# Patient Record
Sex: FEMALE | Race: OTHER | Employment: UNEMPLOYED | RURAL
[De-identification: names, ages, dates, MRNs, and addresses within clinical notes are randomized per-mention and may not be internally consistent; named-entity substitution may affect disease eponyms.]

---

## 2018-12-07 ENCOUNTER — OFFICE VISIT (OUTPATIENT)
Dept: FAMILY MEDICINE CLINIC | Age: 28
End: 2018-12-07

## 2018-12-07 VITALS
HEART RATE: 68 BPM | TEMPERATURE: 98.2 F | OXYGEN SATURATION: 96 % | WEIGHT: 131 LBS | HEIGHT: 64 IN | SYSTOLIC BLOOD PRESSURE: 102 MMHG | RESPIRATION RATE: 18 BRPM | DIASTOLIC BLOOD PRESSURE: 68 MMHG | BODY MASS INDEX: 22.36 KG/M2

## 2018-12-07 DIAGNOSIS — H93.93 DISORDER OF BOTH EARS: ICD-10-CM

## 2018-12-07 DIAGNOSIS — K50.919 CROHN'S DISEASE WITH COMPLICATION, UNSPECIFIED GASTROINTESTINAL TRACT LOCATION (HCC): ICD-10-CM

## 2018-12-07 DIAGNOSIS — J30.89 ENVIRONMENTAL AND SEASONAL ALLERGIES: ICD-10-CM

## 2018-12-07 DIAGNOSIS — Z86.2 HISTORY OF ANEMIA: ICD-10-CM

## 2018-12-07 DIAGNOSIS — K21.9 GASTROESOPHAGEAL REFLUX DISEASE WITHOUT ESOPHAGITIS: ICD-10-CM

## 2018-12-07 DIAGNOSIS — G43.019 INTRACTABLE MIGRAINE WITHOUT AURA AND WITHOUT STATUS MIGRAINOSUS: ICD-10-CM

## 2018-12-07 DIAGNOSIS — F41.9 ANXIETY: Primary | ICD-10-CM

## 2018-12-07 RX ORDER — RANITIDINE 150 MG/1
150 TABLET, FILM COATED ORAL 2 TIMES DAILY
Qty: 180 TAB | Refills: 1 | Status: SHIPPED | OUTPATIENT
Start: 2018-12-07 | End: 2020-01-07

## 2018-12-07 RX ORDER — ALBUTEROL SULFATE 90 UG/1
2 AEROSOL, METERED RESPIRATORY (INHALATION)
COMMUNITY
End: 2018-12-07

## 2018-12-07 RX ORDER — LEVALBUTEROL TARTRATE 45 UG/1
2 AEROSOL, METERED ORAL
COMMUNITY
End: 2018-12-19 | Stop reason: SDUPTHER

## 2018-12-07 RX ORDER — RANITIDINE 150 MG/1
1 TABLET, FILM COATED ORAL DAILY
Refills: 5 | COMMUNITY
Start: 2018-11-27 | End: 2018-12-07 | Stop reason: SDUPTHER

## 2018-12-07 RX ORDER — CETIRIZINE HCL 10 MG
10 TABLET ORAL DAILY
Qty: 90 TAB | Refills: 1 | Status: SHIPPED | OUTPATIENT
Start: 2018-12-07 | End: 2019-02-11

## 2018-12-07 RX ORDER — ETONOGESTREL AND ETHINYL ESTRADIOL .12; .015 MG/D; MG/D
1 INSERT, EXTENDED RELEASE VAGINAL AS DIRECTED
Refills: 5 | COMMUNITY
Start: 2018-11-27 | End: 2019-02-11

## 2018-12-07 NOTE — PATIENT INSTRUCTIONS
[address] [address] 2164 [facility] JESSICA, [address] Phone: [phone] Walter Lopez Cindy Uriostegui of [employer]  [address] Riley Gu[address]ro [address] Phone: [phone] [facility] [address] , [address] [place], [facility] N. Surekha Cross. Phone: [phone] A Healthy Lifestyle: Care Instructions Your Care Instructions A healthy lifestyle can help you feel good, stay at a healthy weight, and have plenty of energy for both work and play. A healthy lifestyle is something you can share with your whole family. A healthy lifestyle also can lower your risk for serious health problems, such as high blood pressure, heart disease, and diabetes. You can follow a few steps listed below to improve your health and the health of your family. Follow-up care is a key part of your treatment and safety. Be sure to make and go to all appointments, and call your doctor if you are having problems. It's also a good idea to know your test results and keep a list of the medicines you take. How can you care for yourself at home? · Do not eat too much sugar, fat, or fast foods. You can still have dessert and treats now and then. The goal is moderation. · Start small to improve your eating habits. Pay attention to portion sizes, drink less juice and soda pop, and eat more fruits and vegetables. ? Eat a healthy amount of food. A 3-ounce serving of meat, for example, is about the size of a deck of cards. Fill the rest of your plate with vegetables and whole grains. ? Limit the amount of soda and sports drinks you have every day. Drink more water when you are thirsty. ? Eat at least 5 servings of fruits and vegetables every day. It may seem like a lot, but it is not hard to reach this goal. A serving or helping is 1 piece of fruit, 1 cup of vegetables, or 2 cups of leafy, raw vegetables. Have an apple or some carrot sticks as an afternoon snack instead of a candy bar. Try to have fruits and/or vegetables at every meal. 
· Make exercise part of your daily routine. You may want to start with simple activities, such as walking, bicycling, or slow swimming. Try to be active 30 to 60 minutes every day. You do not need to do all 30 to 60 minutes all at once. For example, you can exercise 3 times a day for 10 or 20 minutes. Moderate exercise is safe for most people, but it is always a good idea to talk to your doctor before starting an exercise program. 
· Keep moving. Donovan Acharya the lawn, work in the garden, or [employer]. Take the stairs instead of the elevator at work. · If you smoke, quit. People who smoke have an increased risk for heart attack, stroke, cancer, and other lung illnesses. Quitting is hard, but there are ways to boost your chance of quitting tobacco for good. ? Use nicotine gum, patches, or lozenges. ? Ask your doctor about stop-smoking programs and medicines. ? Keep trying. In addition to reducing your risk of diseases in the future, you will notice some benefits soon after you stop using tobacco. If you have shortness of breath or asthma symptoms, they will likely get better within a few weeks after you quit. · Limit how much alcohol you drink. Moderate amounts of alcohol (up to 2 drinks a day for men, 1 drink a day for women) are okay. But drinking too much can lead to liver problems, high blood pressure, and other health problems. Family health If you have a family, there are many things you can do together to improve your health. · Eat meals together as a family as often as possible. · Eat healthy foods. This includes fruits, vegetables, lean meats and dairy, and whole grains. · Include your family in your fitness plan.  Most people think of activities such as jogging or tennis as the way to fitness, but there are many ways you and your family can be more active. Anything that makes you breathe hard and gets your heart pumping is exercise. Here are some tips: 
? Walk to do errands or to take your child to school or the bus. 
? Go for a family bike ride after dinner instead of watching TV. Where can you learn more? Go to http://krysten-ramo.info/. Enter N685 in the search box to learn more about \"A Healthy Lifestyle: Care Instructions. \" Current as of: December 7, 2017 Content Version: 11.8 © 4900-2808 [employer]. Care instructions adapted under license by [employer] (which disclaims liability or warranty for this information). If you have questions about a medical condition or this instruction, always ask your healthcare professional. [address] any warranty or liability for your use of this information.

## 2018-12-07 NOTE — PROGRESS NOTES
Identified pt with two pt identifiers(name and ). Chief Complaint Patient presents with Juan R Rhodes Establish Care Moved from [place] in August.   
 Anxiety  
  developed symptoms  upon moving to [place], has gotten worse with loss of job.  New Order GI referral for Crohns, Allergist for immunotherapy, ENT for a strange tapping noise in ears. Health Maintenance Due Topic  DTaP/Tdap/Td series (1 - Tdap)  PAP AKA CERVICAL CYTOLOGY Wt Readings from Last 3 Encounters:  
18 131 lb (59.4 kg) Temp Readings from Last 3 Encounters:  
18 98.2 °F (36.8 °C) (Oral) BP Readings from Last 3 Encounters:  
18 102/68 Pulse Readings from Last 3 Encounters:  
18 68 Learning Assessment: 
:  
 
No flowsheet data found. Depression Screening: 
:  
 
PHQ over the last two weeks 2018 Little interest or pleasure in doing things Not at all Feeling down, depressed, irritable, or hopeless Not at all Total Score PHQ 2 0 Fall Risk Assessment: 
:  
 
No flowsheet data found. Abuse Screening: 
:  
 
Abuse Screening Questionnaire 2018 Do you ever feel afraid of your partner? Ml Ashby Are you in a relationship with someone who physically or mentally threatens you? Ml Ashby Is it safe for you to go home? Ilene Mortimer Coordination of Care Questionnaire: 
:  
 
1) Have you been to an emergency room, urgent care clinic since your last visit? no  
Hospitalized since your last visit? no          
 
2) Have you seen or consulted any other health care providers outside of [address] [place] [address] since your last visit? yes  [facility]. PCP [facility] Carlos Alberto Sof[facility] (Include any pap smears or colon screenings in this section.) 3) Do you have an Advance Directive on file? no 
Are you interested in receiving information about Advance Directives? no 
 
Reviewed record in preparation for visit and have obtained necessary documentation. Medication reconciliation up to date and corrected with patient at this time.

## 2018-12-07 NOTE — PROGRESS NOTES
Subjective:  
  
Zoya Soares is a 33 y.o. female new patient here today to establish care. Crohn's Disease: diagnosed at the age 17 and has been managed by Gastroenterology, intermittently has symptoms. Asthma: triggers cold and allergens, has been on immunotherapy for allergies and would like to see Allergist.  
 
Anxiety: started in September 2018 after she moved from [place] to [place]. Symptoms will come and go and she is finding that this occurs in moments. Does have history of postpartum depression which did not require medication. Finds that rain storms trigger her anxiety. - Episodes will last for about an hour before self-resolving - She is finding that work has been going well ANJANA 2/7 12/7/2018 Feeling nervous, anxious or on edge? 3 Not being able to stop or control worrying? 3 ANJANA-2 Subtotal 6 Worrying too much about different things? 3 Trouble relaxing? 2 Being so restless that it is hard to sit still? 1 Becoming easily annoyed or irritable? 1 Feeling afraid as if something awful might happen? 2  
ANJANA-7 Total Score 15 ANJANA-7 Result Severe Anxiety If you checked off any problems, how difficult have these problems made it for you to do your work, take care of thinks at home, or get along with other people? Somewhat difficult GERD: on ranitidine and controlled. No dysphagia, hematemesis, melena, hematochezia, unexplained weight loss. Migraine: per history, associated with light sensitivity, nausea, vomiting. Requests to see Neurology. Ears: has been having weird sensation in the ears for a few months. No ear trauma, drainage. Requests to see ENT. Current Outpatient Medications Medication Sig Dispense Refill  raNITIdine (ZANTAC) 150 mg tablet Take 1 Tab by mouth daily. 5  
 NUVARING 0.12-0.015 mg/24 hr vaginal ring Insert 1 Ring into vagina as directed.   5  
 levalbuterol tartrate (XOPENEX HFA) 45 mcg/actuation inhaler Take 2 Puffs by inhalation every four (4) hours as needed for Wheezing. Allergies Allergen Reactions  Shellfish Derived Hives Past medical history - reviewed. Past Medical History:  
Diagnosis Date  Asthma  Crohn's disease ([address]  Environmental and seasonal allergies  GERD (gastroesophageal reflux disease)  Migraines  Vertigo Social history - reviewed. Social History Tobacco Use  Smoking status: Former Smoker Types: Cigarettes  Smokeless tobacco: Never Used  Tobacco comment: Vapes Substance Use Topics  Alcohol use: Yes Frequency: 2-4 times a month Drinks per session: 1 or 2 Binge frequency: Never Family history - reviewed. Family History Problem Relation Age of Onset  Diabetes Mother  Hypertension Mother  Elevated Lipids Mother  Hypertension Father  Elevated Lipids Father Review of Systems Pertinent items are noted in HPI. Objective:  
 
Visit Vitals /68 (BP 1 Location: Right arm, BP Patient Position: Sitting) Comment: Manual  
Pulse 68 Temp 98.2 °F (36.8 °C) (Oral) Comment: . Resp 18 Ht 5' 4\" (1.626 m) Wt 131 lb (59.4 kg) LMP 11/27/2018 (Exact Date) SpO2 96% BMI 22.49 kg/m² General appearance - alert, well appearing, and in no distress Eyes - pupils equal and reactive, extraocular eye movements intact, sclera anicteric Oropharyngx - mucous membranes moist, pharynx normal without lesions Neck - supple, no significant adenopathy, thyroid exam: thyroid is normal in size without nodules or tenderness Chest - clear to auscultation, no wheezes, rales or rhonchi, symmetric air entry, no tachypnea, retractions or cyanosis Heart - normal rate, regular rhythm, normal S1, S2, no murmurs, rubs, clicks or gallops Abdomen - soft, nontender, nondistended, no masses or organomegaly Extremities - peripheral pulses normal, no pedal edema, no clubbing or cyanosis Neurologic - alert, oriented, normal speech, no focal findings or movement disorder noted Mental Status - alert, oriented to person, place, and time, normal behavior, speech, dress, motor activity, and thought processes. Appears mildly anxious. Assessment/Plan:  
Carlos Juarez is a 33 y.o. female seen for:  
 
1. Anxiety: check labs as below. Severe anxiety on ANJANA-7 screening and did discuss medical therapy (SSRI/SNRI vs Buspar vs PRN anxiolytic therapy). She is hesitant to start medical therapy at this time. Discussed outpatient behavioral therapy which she is amendable to. Provided with information to local offices and encouraged to schedule appointment soon. - CBC WITH AUTOMATED DIFF 
- METABOLIC PANEL, COMPREHENSIVE 
- THYROID CASCADE PROFILE 2. History of anemia 
- CBC WITH AUTOMATED DIFF 3. Crohn's disease with complication, unspecified gastrointestinal tract location [facility]): per history, will need to establish care with Gastroenterologist in the area. Referral placed. - REFERRAL TO GASTROENTEROLOGY 4. Environmental and seasonal allergies: continue with cetirizine. Had been treated with allergy injections prior to moving to [place]. Refer to Allergy.  
- REFERRAL TO ALLERGY 
- cetirizine (ZYRTEC) 10 mg tablet; Take 1 Tab by mouth daily. Dispense: 90 Tab; Refill: 1 
 
5. Intractable migraine without aura and without status migrainosus 
- REFERRAL TO NEUROLOGY 6. Disorder of both ears 
- REFERRAL TO ENT-OTOLARYNGOLOGY 7. Gastroesophageal reflux disease without esophagitis: controlled with use of ranitidine. Continue with current therapy. - raNITIdine (ZANTAC) 150 mg tablet; Take 1 Tab by mouth two (2) times a day. Dispense: 180 Tab; Refill: 1 I have discussed the diagnosis with the patient and the intended plan as seen in the above orders. The patient has received an after-visit summary and questions were answered concerning future plans.  I have discussed medication side effects and warnings with the patient as well. Patient verbalizes understanding of plan of care and denies further questions or concerns at this time. Informed patient to return to the office if symptoms worsen or if new symptoms arise. Follow-up Disposition: 
Return as needed.

## 2018-12-08 LAB
ALBUMIN SERPL-MCNC: 4.2 G/DL (ref 3.5–5.5)
ALBUMIN/GLOB SERPL: 1.6 {RATIO} (ref 1.2–2.2)
ALP SERPL-CCNC: 54 IU/L (ref 39–117)
ALT SERPL-CCNC: 11 IU/L (ref 0–32)
AST SERPL-CCNC: 12 IU/L (ref 0–40)
BASOPHILS # BLD AUTO: 0 X10E3/UL (ref 0–0.2)
BASOPHILS NFR BLD AUTO: 0 %
BILIRUB SERPL-MCNC: 0.3 MG/DL (ref 0–1.2)
BUN SERPL-MCNC: 9 MG/DL (ref 6–20)
BUN/CREAT SERPL: 10 (ref 9–23)
CALCIUM SERPL-MCNC: 9.1 MG/DL (ref 8.7–10.2)
CHLORIDE SERPL-SCNC: 105 MMOL/L (ref 96–106)
CO2 SERPL-SCNC: 22 MMOL/L (ref 20–29)
CREAT SERPL-MCNC: 0.88 MG/DL (ref 0.57–1)
EOSINOPHIL # BLD AUTO: 0 X10E3/UL (ref 0–0.4)
EOSINOPHIL NFR BLD AUTO: 1 %
ERYTHROCYTE [DISTWIDTH] IN BLOOD BY AUTOMATED COUNT: 12.9 % (ref 12.3–15.4)
GLOBULIN SER CALC-MCNC: 2.6 G/DL (ref 1.5–4.5)
GLUCOSE SERPL-MCNC: 77 MG/DL (ref 65–99)
HCT VFR BLD AUTO: 39.7 % (ref 34–46.6)
HGB BLD-MCNC: 13.4 G/DL (ref 11.1–15.9)
IMM GRANULOCYTES # BLD: 0 X10E3/UL (ref 0–0.1)
IMM GRANULOCYTES NFR BLD: 0 %
LYMPHOCYTES # BLD AUTO: 2 X10E3/UL (ref 0.7–3.1)
LYMPHOCYTES NFR BLD AUTO: 42 %
MCH RBC QN AUTO: 28.3 PG (ref 26.6–33)
MCHC RBC AUTO-ENTMCNC: 33.8 G/DL (ref 31.5–35.7)
MCV RBC AUTO: 84 FL (ref 79–97)
MONOCYTES # BLD AUTO: 0.3 X10E3/UL (ref 0.1–0.9)
MONOCYTES NFR BLD AUTO: 6 %
NEUTROPHILS # BLD AUTO: 2.5 X10E3/UL (ref 1.4–7)
NEUTROPHILS NFR BLD AUTO: 51 %
PLATELET # BLD AUTO: 237 X10E3/UL (ref 150–379)
POTASSIUM SERPL-SCNC: 4.3 MMOL/L (ref 3.5–5.2)
PROT SERPL-MCNC: 6.8 G/DL (ref 6–8.5)
RBC # BLD AUTO: 4.74 X10E6/UL (ref 3.77–5.28)
SODIUM SERPL-SCNC: 141 MMOL/L (ref 134–144)
TSH SERPL DL<=0.005 MIU/L-ACNC: 1.25 UIU/ML (ref 0.45–4.5)
WBC # BLD AUTO: 4.8 X10E3/UL (ref 3.4–10.8)

## 2018-12-11 ENCOUNTER — TELEPHONE (OUTPATIENT)
Dept: FAMILY MEDICINE CLINIC | Age: 28
End: 2018-12-11

## 2018-12-11 NOTE — TELEPHONE ENCOUNTER
----- Message from Wilfredo Gonzalez MD sent at 12/10/2018  9:30 AM EST -----  Labs normal. Letter will be sent to home as well.

## 2018-12-19 ENCOUNTER — OFFICE VISIT (OUTPATIENT)
Dept: FAMILY MEDICINE CLINIC | Age: 28
End: 2018-12-19

## 2018-12-19 VITALS
HEIGHT: 64 IN | SYSTOLIC BLOOD PRESSURE: 116 MMHG | BODY MASS INDEX: 22.36 KG/M2 | DIASTOLIC BLOOD PRESSURE: 74 MMHG | OXYGEN SATURATION: 98 % | TEMPERATURE: 98.1 F | RESPIRATION RATE: 16 BRPM | WEIGHT: 131 LBS | HEART RATE: 92 BPM

## 2018-12-19 DIAGNOSIS — F41.9 ANXIETY: ICD-10-CM

## 2018-12-19 DIAGNOSIS — H93.90 DISORDER OF EAR, UNSPECIFIED LATERALITY: Primary | ICD-10-CM

## 2018-12-19 DIAGNOSIS — G47.00 INSOMNIA, UNSPECIFIED TYPE: ICD-10-CM

## 2018-12-19 DIAGNOSIS — J45.40 MODERATE PERSISTENT ASTHMA WITHOUT COMPLICATION: ICD-10-CM

## 2018-12-19 RX ORDER — LEVALBUTEROL TARTRATE 45 UG/1
2 AEROSOL, METERED ORAL
Qty: 1 INHALER | Refills: 2 | Status: SHIPPED | OUTPATIENT
Start: 2018-12-19 | End: 2019-02-11

## 2018-12-19 RX ORDER — BUSPIRONE HYDROCHLORIDE 30 MG/1
15 TABLET ORAL
Qty: 30 TAB | Refills: 0 | Status: SHIPPED | OUTPATIENT
Start: 2018-12-19 | End: 2019-02-11

## 2018-12-19 NOTE — PROGRESS NOTES
Identified pt with two pt identifiers(name and ). Chief Complaint   Patient presents with    Sleep Problem     Reports she was dx by her physician in [place] with trouble sleeping when the seasons change and was previously prescribed Ambien to tx sx. Health Maintenance Due   Topic    Pneumococcal 19-64 Medium Risk (1 of 1 - PPSV23)    DTaP/Tdap/Td series (1 - Tdap)    PAP AKA CERVICAL CYTOLOGY        Wt Readings from Last 3 Encounters:   18 131 lb (59.4 kg)   18 131 lb (59.4 kg)     Temp Readings from Last 3 Encounters:   18 98.1 °F (36.7 °C) (Oral)   18 98.2 °F (36.8 °C) (Oral)     BP Readings from Last 3 Encounters:   18 116/74   18 102/68     Pulse Readings from Last 3 Encounters:   18 92   18 68         Learning Assessment:  :     Learning Assessment 2018   PRIMARY LEARNER Patient   BARRIERS PRIMARY LEARNER NONE   CO-LEARNER CAREGIVER No   PRIMARY LANGUAGE ENGLISH   LEARNER PREFERENCE PRIMARY DEMONSTRATION     LISTENING   ANSWERED BY patient   RELATIONSHIP SELF       Depression Screening:  :     PHQ over the last two weeks 2018   Little interest or pleasure in doing things Not at all   Feeling down, depressed, irritable, or hopeless Not at all   Total Score PHQ 2 0       Fall Risk Assessment:  :     No flowsheet data found. Abuse Screening:  :     Abuse Screening Questionnaire 2018   Do you ever feel afraid of your partner? N   Are you in a relationship with someone who physically or mentally threatens you? N   Is it safe for you to go home?  Y       Coordination of Care Questionnaire:  :     1) Have you been to an emergency room, urgent care clinic since your last visit? no   Hospitalized since your last visit? no             2) Have you seen or consulted any other health care providers outside of [address] [place] [address] since your last visit? no  (Include any pap smears or colon screenings in this section.)    3) Do you have an Advance Directive on file? no  Are you interested in receiving information about Advance Directives? no    Patient is accompanied by her young son. I have received verbal consent from Richa Pelayo to discuss any/all medical information while they are present in the room. Reviewed record in preparation for visit and have obtained necessary documentation. Medication reconciliation up to date and corrected with patient at this time.

## 2018-12-19 NOTE — PATIENT INSTRUCTIONS

## 2018-12-19 NOTE — PROGRESS NOTES
HISTORY OF PRESENT ILLNESS  Darren Bello is a 33 y.o. female. HPI  Pt presents with \"sleep problem\"    Pt states that she is having trouble with sleep, for a couple of days. Pt states that she had this issue in the past, and was prescribed Ambien, which helped her. Pt states that she does not want Ambien, as she is worried about side effects with her caring for her son. Pt states that she is unable to fall asleep until 3 am.  Once she falls asleep, she is able to stay asleep. She has tried Melatonin, journaling at night, decreasing screen time, etc.    Pt states that she has tried Atarax previously, and it gave her migraines. Pt does deal with anxiety, and is trying to get in with psychiatry currently. She has a call out \"to someone, unsure of name, and awaiting a call back\". In addition, she would like referral to ENT and refills of her asthma medication. Review of Systems   Constitutional: Negative for fever. HENT: Negative for congestion. Respiratory: Negative for cough. Psychiatric/Behavioral: The patient is nervous/anxious and has insomnia. Physical Exam   Constitutional: She is oriented to person, place, and time. She appears well-developed and well-nourished. HENT:   Head: Normocephalic and atraumatic. Cardiovascular: Normal rate, regular rhythm and normal heart sounds. Pulmonary/Chest: Effort normal and breath sounds normal.   Neurological: She is alert and oriented to person, place, and time. Skin: Skin is warm and dry. Psychiatric: She has a normal mood and affect. Her behavior is normal.       ASSESSMENT and PLAN    ICD-10-CM ICD-9-CM    1. Disorder of ear, unspecified laterality H93.90 388.9 REFERRAL TO ENT-OTOLARYNGOLOGY   2. Moderate persistent asthma without complication T11.05 128.66 levalbuterol tartrate (XOPENEX HFA) 45 mcg/actuation inhaler   3. Anxiety F41.9 300.00 busPIRone (BUSPAR) 30 mg tablet   4.  Insomnia, unspecified type G47.00 780.52 busPIRone (BUSPAR) 30 mg tablet     Informed patient that I believe that getting her anxiety under better control, will help with her insomnia. Educated about importance of following up with psychiatry. Will try Buspar at night, to see if this aids in symptoms. Pt informed to return to office with worsening of symptoms, or PRN with any questions or concerns. Pt verbalizes understanding of plan of care and denies further questions or concerns at this time.

## 2019-01-04 ENCOUNTER — TELEPHONE (OUTPATIENT)
Dept: FAMILY MEDICINE CLINIC | Age: 29
End: 2019-01-04

## 2019-02-04 ENCOUNTER — OFFICE VISIT (OUTPATIENT)
Dept: NEUROLOGY | Age: 29
End: 2019-02-04

## 2019-02-04 ENCOUNTER — TELEPHONE (OUTPATIENT)
Dept: FAMILY MEDICINE CLINIC | Age: 29
End: 2019-02-04

## 2019-02-04 VITALS
HEIGHT: 64 IN | OXYGEN SATURATION: 98 % | WEIGHT: 130 LBS | BODY MASS INDEX: 22.2 KG/M2 | SYSTOLIC BLOOD PRESSURE: 100 MMHG | DIASTOLIC BLOOD PRESSURE: 60 MMHG | HEART RATE: 78 BPM | RESPIRATION RATE: 16 BRPM

## 2019-02-04 DIAGNOSIS — G43.011 INTRACTABLE MIGRAINE WITHOUT AURA AND WITH STATUS MIGRAINOSUS: Primary | ICD-10-CM

## 2019-02-04 RX ORDER — NAPROXEN 500 MG/1
500 TABLET, DELAYED RELEASE ORAL 2 TIMES DAILY WITH MEALS
COMMUNITY
End: 2019-02-11

## 2019-02-04 NOTE — LETTER
2/4/2019 2:42 PM 
 
Patient:  Yolande Ramirez YOB: 1990 Date of Visit: 2/4/2019 Dear Liz Bruce MD 
[address] [facility]  [address] [facility] [phone] VIA In Basket 
 : Thank you for referring Ms. Yolande Ramirez to me for evaluation/treatment. Below are the relevant portions of my assessment and plan of care. If you have questions, please do not hesitate to call me. I look forward to following Ms. Johnson along with you. Sincerely, Asim Núñez MD

## 2019-02-04 NOTE — PROGRESS NOTES
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 HPI:     Since she was 18 yo, patient noted headaches, L occipital, 10/10, occurring 1/ week, missing meal, not sleeping seem to be triggers, lasting for 2 -3 days, Naproxen does not work, Excedrin sometimes work, (+) nausea (-) vomiting (+) photophobia (+) phonophobia (-) visual auras. In Aug 2018, patient saw a neurologist at [place] and was suppose to get an MRI brain and to start a medication.     ROS   (-) fever  (-) rash  All other systems reviewed and are negative    Past Medical Hx  Past Medical History:   Diagnosis Date    Asthma     Crohn's disease ([facility] [address]     Environmental and seasonal allergies     GERD (gastroesophageal reflux disease)     Migraines     Vertigo    Anxiety    Social Hx  Social History     Socioeconomic History    Marital status: SINGLE     Spouse name: Not on file    Number of children: Not on file    Years of education: Not on file    Highest education level: Not on file   Tobacco Use    Smoking status: Former Smoker     Types: Cigarettes    Smokeless tobacco: Never Used    Tobacco comment: Vapes   Substance and Sexual Activity    Alcohol use: Yes     Frequency: 2-4 times a month     Drinks per session: 1 or 2     Binge frequency: Never    Drug use: No    Sexual activity: Yes   Moved from [facility] Hx  Family History   Problem Relation Age of Onset    Diabetes Mother     Hypertension Mother     Elevated Lipids Mother     Hypertension Father     Elevated Lipids Father        ALLERGIES  Allergies   Allergen Reactions    Shellfish Derived Hives       CURRENT MEDS  Current Outpatient Medications   Medication Sig Dispense Refill    naproxen EC (NAPROSYN EC) 500 mg EC tablet Take 500 mg by mouth two (2) times daily (with meals).  raNITIdine (ZANTAC) 150 mg tablet Take 1 Tab by mouth two (2) times a day. 180 Tab 1    levalbuterol tartrate (XOPENEX HFA) 45 mcg/actuation inhaler Take 2 Puffs by inhalation every four (4) hours as needed for Wheezing. 1 Inhaler 2    busPIRone (BUSPAR) 30 mg tablet Take 0.5 Tabs by mouth nightly as needed. 30 Tab 0    NUVARING 0.12-0.015 mg/24 hr vaginal ring Insert 1 Ring into vagina as directed. 5    cetirizine (ZYRTEC) 10 mg tablet Take 1 Tab by mouth daily. 90 Tab 1           PREVIOUS WORKUP: (reviewed)  IMAGING:    CT Results (recent):  No results found for this or any previous visit. MRI Results (recent):  No results found for this or any previous visit. IR Results (recent):  No results found for this or any previous visit. VAS/US Results (recent):  No results found for this or any previous visit. LABS (reviewed)  Results for orders placed or performed in visit on 12/07/18   CBC WITH AUTOMATED DIFF   Result Value Ref Range    WBC 4.8 3.4 - 10.8 x10E3/uL    RBC 4.74 3.77 - 5.28 x10E6/uL    HGB 13.4 11.1 - 15.9 g/dL    HCT 39.7 34.0 - 46.6 %    MCV 84 79 - 97 fL    MCH 28.3 26.6 - 33.0 pg    MCHC 33.8 31.5 - 35.7 g/dL    RDW 12.9 12.3 - 15.4 %    PLATELET 961 090 - 445 x10E3/uL    NEUTROPHILS 51 Not Estab. %    Lymphocytes 42 Not Estab. %    MONOCYTES 6 Not Estab. %    EOSINOPHILS 1 Not Estab. %    BASOPHILS 0 Not Estab. %    ABS. NEUTROPHILS 2.5 1.4 - 7.0 x10E3/uL    Abs Lymphocytes 2.0 0.7 - 3.1 x10E3/uL    ABS. MONOCYTES 0.3 0.1 - 0.9 x10E3/uL    ABS. EOSINOPHILS 0.0 0.0 - 0.4 x10E3/uL    ABS. BASOPHILS 0.0 0.0 - 0.2 x10E3/uL    IMMATURE GRANULOCYTES 0 Not Estab. %    ABS. IMM.  GRANS. 0.0 0.0 - 0.1 A65R6/BS   METABOLIC PANEL, COMPREHENSIVE   Result Value Ref Range    Glucose 77 65 - 99 mg/dL    BUN 9 6 - 20 mg/dL    Creatinine 0.88 0.57 - 1.00 mg/dL    GFR est non-AA 90 >59 mL/min/1.73    GFR est  >59 mL/min/1.73    BUN/Creatinine ratio 10 9 - 23 Sodium 141 134 - 144 mmol/L    Potassium 4.3 3.5 - 5.2 mmol/L    Chloride 105 96 - 106 mmol/L    CO2 22 20 - 29 mmol/L    Calcium 9.1 8.7 - 10.2 mg/dL    Protein, total 6.8 6.0 - 8.5 g/dL    Albumin 4.2 3.5 - 5.5 g/dL    GLOBULIN, TOTAL 2.6 1.5 - 4.5 g/dL    A-G Ratio 1.6 1.2 - 2.2    Bilirubin, total 0.3 0.0 - 1.2 mg/dL    Alk. phosphatase 54 39 - 117 IU/L    AST (SGOT) 12 0 - 40 IU/L    ALT (SGPT) 11 0 - 32 IU/L   THYROID CASCADE PROFILE   Result Value Ref Range    TSH 1.250 0.450 - 4.500 uIU/mL       Physical Exam:     Visit Vitals  /60 (BP 1 Location: Right arm, BP Patient Position: Sitting)   Pulse 78   Resp 16   Ht 5' 4\" (1.626 m)   Wt 59 kg (130 lb)   SpO2 98%   BMI 22.31 kg/m²     General:  Alert, cooperative, no distress. Head:  Normocephalic, without obvious abnormality, atraumatic. Eyes:  Conjunctivae/corneas clear. Lungs:  Heart:   Non labored breathing  Regular rate and rhythm, no carotid bruits   Abdomen:   Soft, non-distended   Extremities: Extremities normal, atraumatic, no cyanosis or edema. Pulses: 2+ and symmetric all extremities. Skin: Skin color, texture, turgor normal. No rashes or lesions.   Neurologic Exam     Gen: Attention normal             Language: naming, repetition, fluency normal             Memory: intact recent and remote memory  Cranial Nerves:  I: smell Not tested   II: visual fields Full to confrontation   II: pupils Equal, round, reactive to light   II: optic disc No papilledema   III,VII: ptosis none   III,IV,VI: extraocular muscles  Full ROM   V: mastication normal   V: facial light touch sensation  normal   VII: facial muscle function   symmetric   VIII: hearing symmetric   IX: soft palate elevation  normal   XI: trapezius strength  5/5   XI: sternocleidomastoid strength 5/5   XI: neck flexion strength  5/5   XII: tongue  midline     Motor: normal bulk and tone, no tremor              Strength: 5/5 all four extremities  Sensory: intact to LT, PP, vibration, and JPS  Reflexes: 2+ throughout; Down going toes  Coordination: Good FTN and HTS  Gait: normal gait including tandem            Impression:     Kolby Infante is a 30 y.o. female who  has a past medical history of Asthma, Crohn's disease ([address] Environmental and seasonal allergies, GERD (gastroesophageal reflux disease), Migraines, and Vertigo. who since she was 16 yo, noted headaches, L occipital, 10/10, occurring 1/ week, missing meal, not sleeping seem to be triggers, lasting for 2 -3 days, Naproxen does not work, Excedrin sometimes work, (+) nausea (-) vomiting (+) photophobia (+) phonophobia (-) visual auras. Consideration includes migraine, without aura, intractable. Patient also has insomnia (problem falling asleep - averaging 5 hrs) which may be contributing to her symptoms. RECOMMENDATIONS  1. Given samples of Cambia, Zipsor and Zomig nasal spray to try to abort headaches  2. Discussed the need for headache diary and went through the list that can trigger headache (I.e. Missing meals, lack of sleep)  3. Went through handout for migraine  4. Depending on above, will consider trial of Amitriptyline. Follow-up Disposition:  Return in about 2 months (around 4/4/2019).       Thank you for the consultation      Tali Driver MD  Diplomate, American Board of Psychiatry and Neurology  Diplomate, Neuromuscular Medicine  Diplomate, American Board of Electrodiagnostic Medicine        CC: Aminata Lobato MD  Fax: [phone]

## 2019-02-04 NOTE — TELEPHONE ENCOUNTER
The pt is calling and is stating that one of her recently prescribed meds isn't covered. She couldn't give me the name. She believed it starts with a \"B\" Wants to know what else could be called in and if it could go to [facility] in [address] She wouldn't give me much info, told me it should be in her chart.

## 2019-02-05 NOTE — TELEPHONE ENCOUNTER
Pt was advised of Emy's recommendations as well as that I have submitted a prior auth for the medication and have not heard from her insurance company yet.

## 2019-02-05 NOTE — TELEPHONE ENCOUNTER
I am sorry about that. I know that she did not want to start a lot of other options for anxiety such as Atarax, or something daily for anxiety/depression.   It may be best if she is seen by psychiatry, as discussed in her office visit as well  Thanks

## 2019-02-11 ENCOUNTER — OFFICE VISIT (OUTPATIENT)
Dept: FAMILY MEDICINE CLINIC | Age: 29
End: 2019-02-11

## 2019-02-11 VITALS
SYSTOLIC BLOOD PRESSURE: 108 MMHG | WEIGHT: 130 LBS | OXYGEN SATURATION: 98 % | HEART RATE: 94 BPM | BODY MASS INDEX: 22.2 KG/M2 | DIASTOLIC BLOOD PRESSURE: 68 MMHG | RESPIRATION RATE: 18 BRPM | HEIGHT: 64 IN | TEMPERATURE: 98.3 F

## 2019-02-11 DIAGNOSIS — G56.02 LEFT CARPAL TUNNEL SYNDROME: ICD-10-CM

## 2019-02-11 DIAGNOSIS — F43.10 PTSD (POST-TRAUMATIC STRESS DISORDER): Primary | ICD-10-CM

## 2019-02-11 DIAGNOSIS — F41.9 ANXIETY: ICD-10-CM

## 2019-02-11 RX ORDER — ALBUTEROL SULFATE 90 UG/1
2 AEROSOL, METERED RESPIRATORY (INHALATION)
Qty: 1 INHALER | Refills: 2 | Status: SHIPPED | OUTPATIENT
Start: 2019-02-11 | End: 2019-11-10 | Stop reason: SDUPTHER

## 2019-02-11 RX ORDER — SERTRALINE HYDROCHLORIDE 25 MG/1
25 TABLET, FILM COATED ORAL DAILY
Qty: 30 TAB | Refills: 2 | Status: SHIPPED | OUTPATIENT
Start: 2019-02-11 | End: 2019-03-07

## 2019-02-11 NOTE — PROGRESS NOTES
Identified pt with two pt identifiers(name and ). Chief Complaint   Patient presents with    Anxiety     Would like to discuss being on medicaion.  New Order     Left wrist brace for carpal tunnel. There are no preventive care reminders to display for this patient. Wt Readings from Last 3 Encounters:   19 130 lb (59 kg)   19 130 lb (59 kg)   18 131 lb (59.4 kg)     Temp Readings from Last 3 Encounters:   19 98.3 °F (36.8 °C) (Oral)   18 98.1 °F (36.7 °C) (Oral)   18 98.2 °F (36.8 °C) (Oral)     BP Readings from Last 3 Encounters:   19 108/68   19 100/60   18 116/74     Pulse Readings from Last 3 Encounters:   19 94   19 78   18 92         Learning Assessment:  :     Learning Assessment 2018   PRIMARY LEARNER Patient   BARRIERS PRIMARY LEARNER NONE   CO-LEARNER CAREGIVER No   PRIMARY LANGUAGE ENGLISH   LEARNER PREFERENCE PRIMARY DEMONSTRATION     LISTENING   ANSWERED BY patient   RELATIONSHIP SELF       Depression Screening:  :     PHQ over the last two weeks 2018   Little interest or pleasure in doing things Not at all   Feeling down, depressed, irritable, or hopeless Not at all   Total Score PHQ 2 0       Fall Risk Assessment:  :     No flowsheet data found. Abuse Screening:  :     Abuse Screening Questionnaire 2018   Do you ever feel afraid of your partner? N   Are you in a relationship with someone who physically or mentally threatens you? N   Is it safe for you to go home? Y       Coordination of Care Questionnaire:  :     1) Have you been to an emergency room, urgent care clinic since your last visit? no   Hospitalized since your last visit? no             2) Have you seen or consulted any other health care providers outside of [address] [place] [address] since your last visit? no  (Include any pap smears or colon screenings in this section.)    3) Do you have an Advance Directive on file?  no  Are you interested in receiving information about Advance Directives? no    Reviewed record in preparation for visit and have obtained necessary documentation. Medication reconciliation up to date and corrected with patient at this time.

## 2019-02-11 NOTE — PROGRESS NOTES
Subjective:      Donald Pappas is a 30 y.o. female here today to discuss medication therapy for PTSD. She has had appointment with [address] [place] [address] and does have another appointment scheduled. States it was recommended that she start medical therapy. She was prescribed buspirone for anxiety, but unfortunately was not covered by her insurance. She is hesitant to start any medications which would cause drowsiness as she does have a young son to care for. Requests Rx for left wrist splint. Reports h/o left carpal tunnel for which she has used splint therapy in the past. Denies decreased  strength or weakness of the left hand. Current Outpatient Medications   Medication Sig Dispense Refill    albuterol (PROVENTIL HFA, VENTOLIN HFA, PROAIR HFA) 90 mcg/actuation inhaler Take 2 Puffs by inhalation every four (4) hours as needed for Wheezing. 1 Inhaler 2    raNITIdine (ZANTAC) 150 mg tablet Take 1 Tab by mouth two (2) times a day. 180 Tab 1       Allergies   Allergen Reactions    Shellfish Derived Hives       Past Medical History:   Diagnosis Date    Asthma     Crohn's disease ([address]     Environmental and seasonal allergies     GERD (gastroesophageal reflux disease)     Migraines     Vertigo        Social History     Tobacco Use    Smoking status: Former Smoker     Types: Cigarettes    Smokeless tobacco: Never Used    Tobacco comment: Vapes   Substance Use Topics    Alcohol use: Yes     Frequency: 2-4 times a month     Drinks per session: 1 or 2     Binge frequency: Never        Review of Systems  Pertinent items are noted in HPI.      Objective:     Visit Vitals  /68 (BP 1 Location: Right arm, BP Patient Position: Sitting) Comment: Manual   Pulse 94   Temp 98.3 °F (36.8 °C) (Oral) Comment: .   Resp 18   Ht 5' 4\" (1.626 m)   Wt 130 lb (59 kg)   LMP 01/15/2019 (Exact Date)   SpO2 98%   BMI 22.31 kg/m²      General appearance - alert, well appearing, and in no distress  Chest - clear to auscultation, no wheezes, rales or rhonchi, symmetric air entry, no tachypnea, retractions or cyanosis  Heart - normal rate, regular rhythm, normal S1, S2, no murmurs, rubs, clicks or gallops  Mental Status: alert, oriented to person, place, and time, normal mood, behavior, speech, dress, motor activity, and thought processes    Assessment/Plan:   Miguel Ángel Shaw is a 30 y.o. female seen for:     1. PTSD (post-traumatic stress disorder): will start sertraline as below and titrate as needed. Medication side effects discussed. Follow up in 4 weeks or sooner as needed. - sertraline (ZOLOFT) 25 mg tablet; Take 1 Tab by mouth daily. Dispense: 30 Tab; Refill: 2  - continue with outpatient psychotherapy    2. Anxiety    3. Left carpal tunnel syndrome: Rx for wrist splint provided. - Arm Brace (NEOPRENE WRIST SPLINT SUPPORT) misc; Use on left wrist as recommended. Dispense: 1 Each; Refill: 0    I have discussed the diagnosis with the patient and the intended plan as seen in the above orders. The patient has received an after-visit summary and questions were answered concerning future plans. I have discussed medication side effects and warnings with the patient as well. Patient verbalizes understanding of plan of care and denies further questions or concerns at this time. Informed patient to return to the office if symptoms worsen or if new symptoms arise. Follow-up Disposition:  Return in about 4 weeks (around 3/11/2019) for follow up or sooner as needed.

## 2019-02-11 NOTE — PATIENT INSTRUCTIONS
Post-Traumatic Stress Disorder (PTSD): Care Instructions  Your Care Instructions    Post-traumatic stress disorder (PTSD) is a mental condition that can result from being in or seeing a traumatic or terrifying event. These events can include combat, a terrorist attack, a natural disaster, a serious accident, an assault, or a rape. If you have PTSD, you may often relive the experience in nightmares or flashbacks. These are clear and frightening memories of the event. You may also have trouble sleeping. PTSD affects people in very different ways. It can interfere with daily activities such as work or school, and it can make you withdraw from friends or loved ones. Follow-up care is a key part of your treatment and safety. Be sure to make and go to all appointments, and call your doctor if you are having problems. It's also a good idea to know your test results and keep a list of the medicines you take. How can you care for yourself at home? · Take medicines exactly as directed. Call your doctor if you think you are having a problem with your medicine. · Go to your counseling sessions and follow-up appointments. · Recognize and accept your anxiety. Then, when you are in a situation that makes you anxious, say to yourself, \"This is not an emergency. I feel uncomfortable, but I am not in danger. I can keep going even if I feel anxious. \"  · Be kind to your body:  ? Relieve tension with exercise or a massage. ? Get enough rest.  ? Avoid alcohol, caffeine, nicotine, and illegal drugs. They can increase your anxiety level and cause sleep problems. ? Learn and do relaxation techniques. See below for more about these techniques. · Engage your mind. Get out and do something you enjoy. Go to a funny movie, or take a walk or hike. Plan your day. Having too much or too little to do can make you anxious. · Keep a record of your symptoms.  Discuss your fears with a good friend or family member, or join a support group for people with similar problems. Talking to others sometimes relieves stress. · Get involved in social groups, or volunteer to help others. Being alone sometimes makes things seem worse than they are. · Get at least 30 minutes of exercise on most days of the week. Walking is a good choice. You also may want to do other activities, such as running, swimming, cycling, or playing tennis or team sports. · Keep the numbers for these national suicide hotlines: 4-830-840-TALK (7-189.949.3188) and 8-581-LXOLEBA (2-364.745.9050). If you or someone you know talks about suicide or feeling hopeless, get help right away. Relaxation techniques  Do relaxation exercises 10 to 20 minutes a day. You can play soothing, relaxing music while you do them, if you wish. · Tell others in your house that you are going to do your relaxation exercises. Ask them not to disturb you. · Find a comfortable place, away from all distractions and noise. · Lie down on your back, or sit with your back straight. · Focus on your breathing. Make it slow and steady. · Breathe in through your nose. Breathe out through either your nose or mouth. · Breathe deeply, filling up the area between your navel and your rib cage. Breathe so that your belly goes up and down. · Do not hold your breath. · Breathe like this for 5 to 10 minutes. Notice the feeling of calmness throughout your whole body. As you continue to breathe slowly and deeply, relax by doing the following for another 5 to 10 minutes:  · Tighten and relax each muscle group in your body. You can begin at your toes and work your way up to your head. · Imagine your muscle groups relaxing and becoming heavy. · Empty your mind of all thoughts. · Let yourself relax more and more deeply. · Become aware of the state of calmness that surrounds you.   · When your relaxation time is over, you can bring yourself back to alertness by moving your fingers and toes and then your hands and feet and then stretching and moving your entire body. Sometimes people fall asleep during relaxation, but they usually wake up shortly afterward. · Always give yourself time to return to full alertness before you drive a car or do anything that might cause an accident if you are not fully alert. Never play a relaxation tape while you drive a car. When should you call for help? Call 911 anytime you think you may need emergency care. For example, call if:    · You feel you cannot stop from hurting yourself or someone else.    Watch closely for changes in your health, and be sure to contact your doctor if:    · Your PTSD symptoms are getting worse.     · You have new or worsening symptoms of anxiety.     · You are not getting better as expected. Where can you learn more? Go to http://krysten-ramo.info/. Coral Score in the search box to learn more about \"Post-Traumatic Stress Disorder (PTSD): Care Instructions. \"  Current as of: September 11, 2018  Content Version: 11.9  © 3196-5266 [employer]. Care instructions adapted under license by [employer] (which disclaims liability or warranty for this information). If you have questions about a medical condition or this instruction, always ask your healthcare professional. [place] [address] any warranty or liability for your use of this information.

## 2019-03-07 ENCOUNTER — OFFICE VISIT (OUTPATIENT)
Dept: FAMILY MEDICINE CLINIC | Age: 29
End: 2019-03-07

## 2019-03-07 VITALS
OXYGEN SATURATION: 98 % | DIASTOLIC BLOOD PRESSURE: 64 MMHG | TEMPERATURE: 98.7 F | HEART RATE: 98 BPM | HEIGHT: 64 IN | WEIGHT: 127 LBS | SYSTOLIC BLOOD PRESSURE: 100 MMHG | BODY MASS INDEX: 21.68 KG/M2 | RESPIRATION RATE: 18 BRPM

## 2019-03-07 DIAGNOSIS — R68.89 FLU-LIKE SYMPTOMS: Primary | ICD-10-CM

## 2019-03-07 DIAGNOSIS — F41.9 ANXIETY: ICD-10-CM

## 2019-03-07 DIAGNOSIS — Z20.828 EXPOSURE TO THE FLU: ICD-10-CM

## 2019-03-07 LAB
QUICKVUE INFLUENZA TEST: NEGATIVE
VALID INTERNAL CONTROL?: YES

## 2019-03-07 RX ORDER — ETONOGESTREL AND ETHINYL ESTRADIOL .12; .015 MG/D; MG/D
1 INSERT, EXTENDED RELEASE VAGINAL
Refills: 0 | COMMUNITY
Start: 2019-02-23 | End: 2019-04-29

## 2019-03-07 RX ORDER — BUSPIRONE HYDROCHLORIDE 10 MG/1
10 TABLET ORAL 2 TIMES DAILY
Qty: 60 TAB | Refills: 2 | Status: SHIPPED | OUTPATIENT
Start: 2019-03-07 | End: 2020-05-04 | Stop reason: ALTCHOICE

## 2019-03-07 NOTE — PROGRESS NOTES
Subjective:      Olya Garcia is a 30 y.o. female here nasal congestion, dry cough, and chest heaviness, subjective chills which started last night. Her son was diagnosed with influenza today per her report. She denies fevers. She she discontinued sertraline due to insomnia, appetite suppression and feeling as though she was in a daze. She reports Atarax gave her headaches and palpitations. She would like something to take as needed for anxiety. Current Outpatient Medications   Medication Sig Dispense Refill    NUVARING 0.12-0.015 mg/24 hr vaginal ring Insert 1 Insert into vagina. 0    albuterol (PROVENTIL HFA, VENTOLIN HFA, PROAIR HFA) 90 mcg/actuation inhaler Take 2 Puffs by inhalation every four (4) hours as needed for Wheezing. 1 Inhaler 2    Arm Brace (NEOPRENE WRIST SPLINT SUPPORT) misc Use on left wrist as recommended. 1 Each 0    raNITIdine (ZANTAC) 150 mg tablet Take 1 Tab by mouth two (2) times a day. 180 Tab 1       Allergies   Allergen Reactions    Shellfish Derived Hives       Past Medical History:   Diagnosis Date    Asthma     [facility] ([facility] [address]     Environmental and seasonal allergies     GERD (gastroesophageal reflux disease)     Migraines     Vertigo        Social History     Tobacco Use    Smoking status: Former Smoker     Types: Cigarettes    Smokeless tobacco: Never Used    Tobacco comment: Vapes   Substance Use Topics    Alcohol use: Yes     Frequency: 2-4 times a month     Drinks per session: 1 or 2     Binge frequency: Never        Review of Systems  Pertinent items are noted in HPI.      Objective:     Visit Vitals  /64 (BP 1 Location: Right arm, BP Patient Position: Sitting) Comment: Manual   Pulse 98   Temp 98.7 °F (37.1 °C) (Oral) Comment: .   Resp 18   Ht 5' 4\" (1.626 m)   Wt 127 lb (57.6 kg)   LMP 02/17/2019 (Exact Date)   SpO2 98%   BMI 21.80 kg/m²      General appearance - alert, well appearing, and in no distress  Eyes - pupils equal and reactive, extraocular eye movements intact, sclera anicteric  Oropharyngx - mucous membranes moist, pharynx normal without lesions  Neck - supple, no significant adenopathy  Chest - clear to auscultation, no wheezes, rales or rhonchi, symmetric air entry, no tachypnea, retractions or cyanosis  Heart - normal rate, regular rhythm, normal S1, S2, no murmurs, rubs, clicks or gallops    Recent Results (from the past 12 hour(s))   AMB POC RAPID INFLUENZA TEST    Collection Time: 03/07/19  3:43 PM   Result Value Ref Range    VALID INTERNAL CONTROL POC Yes     QuickVue Influenza test Negative Negative       Assessment/Plan:   Marlena Joiner is a 30 y.o. female seen for:     1. Flu-like symptoms: negative influenza testing. Discussed preventative therapy with Tamiflu, but due to side effect profile patient declines medication. Symptomatic therapies recommended. - AMB POC RAPID INFLUENZA TEST    2. Anxiety: did not tolerate Atarax or sertraline. Discussed buspirone and will start medication.   - busPIRone (BUSPAR) 10 mg tablet; Take 1 Tab by mouth two (2) times a day. Dispense: 60 Tab; Refill: 2    3. Exposure to the flu    I have discussed the diagnosis with the patient and the intended plan as seen in the above orders. The patient has received an after-visit summary and questions were answered concerning future plans. I have discussed medication side effects and warnings with the patient as well. Patient verbalizes understanding of plan of care and denies further questions or concerns at this time. Informed patient to return to the office if symptoms worsen or if new symptoms arise.     Follow-up Disposition: Not on File

## 2019-03-07 NOTE — PROGRESS NOTES
Identified pt with two pt identifiers(name and ). Chief Complaint   Patient presents with    Cough     Son was Diagnosed with Flu Today.  Nasal Congestion    Medication Evaluation     Would like as needed medication for Anxiety and something to help sleep. There are no preventive care reminders to display for this patient. Wt Readings from Last 3 Encounters:   19 127 lb (57.6 kg)   19 130 lb (59 kg)   19 130 lb (59 kg)     Temp Readings from Last 3 Encounters:   19 98.7 °F (37.1 °C) (Oral)   19 98.3 °F (36.8 °C) (Oral)   18 98.1 °F (36.7 °C) (Oral)     BP Readings from Last 3 Encounters:   19 100/64   19 108/68   19 100/60     Pulse Readings from Last 3 Encounters:   19 98   19 94   19 78         Learning Assessment:  :     Learning Assessment 2018   PRIMARY LEARNER Patient   BARRIERS PRIMARY LEARNER NONE   CO-LEARNER CAREGIVER No   PRIMARY LANGUAGE ENGLISH   LEARNER PREFERENCE PRIMARY DEMONSTRATION     LISTENING   ANSWERED BY patient   RELATIONSHIP SELF       Depression Screening:  :     3 most recent PHQ Screens 3/7/2019   Little interest or pleasure in doing things Not at all   Feeling down, depressed, irritable, or hopeless Not at all   Total Score PHQ 2 0       Fall Risk Assessment:  :     No flowsheet data found. Abuse Screening:  :     Abuse Screening Questionnaire 3/7/2019 2018   Do you ever feel afraid of your partner? N N   Are you in a relationship with someone who physically or mentally threatens you? N N   Is it safe for you to go home?  Y Y       Coordination of Care Questionnaire:  :     1) Have you been to an emergency room, urgent care clinic since your last visit? no   Hospitalized since your last visit? no             2) Have you seen or consulted any other health care providers outside of [address] [place] [address] since your last visit? no  (Include any pap smears or colon screenings in this section.)    3) Do you have an Advance Directive on file? no  Are you interested in receiving information about Advance Directives? no    Reviewed record in preparation for visit and have obtained necessary documentation. Medication reconciliation up to date and corrected with patient at this time.

## 2019-04-08 ENCOUNTER — TELEPHONE (OUTPATIENT)
Dept: FAMILY MEDICINE CLINIC | Age: 29
End: 2019-04-08

## 2019-04-08 NOTE — TELEPHONE ENCOUNTER
Pt would like a call back in regards to anxiety medication and at its peak right now and needs something more to help right now because upset over storm coming  Pt wants to be contacted today at [phone]

## 2019-04-11 ENCOUNTER — TELEPHONE (OUTPATIENT)
Dept: FAMILY MEDICINE CLINIC | Age: 29
End: 2019-04-11

## 2019-04-11 DIAGNOSIS — F41.9 ANXIETY: Primary | ICD-10-CM

## 2019-04-11 RX ORDER — LORAZEPAM 0.5 MG/1
0.25 TABLET ORAL
Qty: 10 TAB | Refills: 0 | Status: SHIPPED | OUTPATIENT
Start: 2019-04-11 | End: 2020-05-04 | Stop reason: ALTCHOICE

## 2019-04-11 NOTE — TELEPHONE ENCOUNTER
Patient called. Has extreme panic attacks with thunderstorms. She has been taking Buspar 5 mg (has been splitting prescribed 10 mg tablet) with some improvement in overall anxiety. Concerned that medication was not effective with recent thunderstorms. Did not tolerate Atarax use previously. As I have discussed with her before, may try low dose benzodiazapine therapy for these episodes only. Medication side effects discussed. Rx for lorazepam provided and she will  from office. Requested Prescriptions     Signed Prescriptions Disp Refills    LORazepam (ATIVAN) 0.5 mg tablet 10 Tab 0     Sig: Take 0.5 Tabs by mouth daily as needed for Anxiety. For severe anxiety/panic.      Authorizing Provider: Kyle Rivera

## 2019-04-25 ENCOUNTER — OFFICE VISIT (OUTPATIENT)
Dept: FAMILY MEDICINE CLINIC | Age: 29
End: 2019-04-25

## 2019-04-25 VITALS
OXYGEN SATURATION: 98 % | DIASTOLIC BLOOD PRESSURE: 74 MMHG | WEIGHT: 126.6 LBS | HEIGHT: 64 IN | RESPIRATION RATE: 20 BRPM | BODY MASS INDEX: 21.61 KG/M2 | TEMPERATURE: 97.8 F | SYSTOLIC BLOOD PRESSURE: 106 MMHG | HEART RATE: 73 BPM

## 2019-04-25 DIAGNOSIS — N39.0 URINARY TRACT INFECTION WITHOUT HEMATURIA, SITE UNSPECIFIED: Primary | ICD-10-CM

## 2019-04-25 LAB
BILIRUB UR QL STRIP: NEGATIVE
GLUCOSE UR-MCNC: NEGATIVE MG/DL
KETONES P FAST UR STRIP-MCNC: NEGATIVE MG/DL
PH UR STRIP: 7 [PH] (ref 4.6–8)
PROT UR QL STRIP: NEGATIVE
SP GR UR STRIP: 1.01 (ref 1–1.03)
UA UROBILINOGEN AMB POC: NORMAL (ref 0.2–1)
URINALYSIS CLARITY POC: CLEAR
URINALYSIS COLOR POC: YELLOW
URINE BLOOD POC: NORMAL
URINE LEUKOCYTES POC: NEGATIVE
URINE NITRITES POC: NEGATIVE

## 2019-04-25 RX ORDER — NITROFURANTOIN 25; 75 MG/1; MG/1
100 CAPSULE ORAL 2 TIMES DAILY
Qty: 10 CAP | Refills: 0 | Status: ON HOLD | OUTPATIENT
Start: 2019-04-25 | End: 2019-05-01

## 2019-04-25 NOTE — PROGRESS NOTES
Identified pt with two pt identifiers(name and ). Chief Complaint   Patient presents with    Bladder Infection     started yesterday - lower abd pain and burning    Dizziness     she just started allergy shots     Anemia     she would like labs        Health Maintenance Due   Topic    Pneumococcal 0-64 years (1 of 1 - PPSV23)       Wt Readings from Last 3 Encounters:   19 126 lb 9.6 oz (57.4 kg)   19 127 lb (57.6 kg)   19 130 lb (59 kg)     Temp Readings from Last 3 Encounters:   19 97.8 °F (36.6 °C) (Oral)   19 98.7 °F (37.1 °C) (Oral)   19 98.3 °F (36.8 °C) (Oral)     BP Readings from Last 3 Encounters:   19 106/74   19 100/64   19 108/68     Pulse Readings from Last 3 Encounters:   19 73   19 98   19 94         Learning Assessment:  :     Learning Assessment 2018   PRIMARY LEARNER Patient   BARRIERS PRIMARY LEARNER NONE   CO-LEARNER CAREGIVER No   PRIMARY LANGUAGE ENGLISH   LEARNER PREFERENCE PRIMARY DEMONSTRATION     LISTENING   ANSWERED BY patient   RELATIONSHIP SELF       Depression Screening:  :     3 most recent PHQ Screens 3/7/2019   Little interest or pleasure in doing things Not at all   Feeling down, depressed, irritable, or hopeless Not at all   Total Score PHQ 2 0       Fall Risk Assessment:  :     No flowsheet data found. Abuse Screening:  :     Abuse Screening Questionnaire 3/7/2019 2018   Do you ever feel afraid of your partner? N N   Are you in a relationship with someone who physically or mentally threatens you? N N   Is it safe for you to go home? Y Y       Coordination of Care Questionnaire:  :     1) Have you been to an emergency room, urgent care clinic since your last visit? no   Hospitalized since your last visit? no             2) Have you seen or consulted any other health care providers outside of [address] [place] [address] since your last visit?  yes  Allergist (Include any pap smears or colon screenings in this section.)    3) Do you have an Advance Directive on file? no  Are you interested in receiving information about Advance Directives? Yes paper work given and explained    Patient is accompanied by son I have received verbal consent from Prudence Riddles to discuss any/all medical information while they are present in the room. Reviewed record in preparation for visit and have obtained necessary documentation. Medication reconciliation up to date and corrected with patient at this time.      Results for orders placed or performed in visit on 04/25/19   AMB POC URINALYSIS DIP STICK AUTO W/O MICRO   Result Value Ref Range    Color (UA POC) Yellow     Clarity (UA POC) Clear     Glucose (UA POC) Negative Negative    Bilirubin (UA POC) Negative Negative    Ketones (UA POC) Negative Negative    Specific gravity (UA POC) 1.010 1.001 - 1.035    Blood (UA POC) Trace Negative    pH (UA POC) 7.0 4.6 - 8.0    Protein (UA POC) Negative Negative    Urobilinogen (UA POC) 0.2 mg/dL 0.2 - 1    Nitrites (UA POC) Negative Negative    Leukocyte esterase (UA POC) Negative Negative

## 2019-04-25 NOTE — PROGRESS NOTES
HISTORY OF PRESENT ILLNESS  Shayla Braden is a 30 y.o. female. HPI  Pt presents with \"possible UTI and labs for anemia\"    Pt states that she believes that she may have a UTI  She has some pain with urination  Increased frequency  No fever  No nausea, no vomiting, no diarrhea    She would also like some lab work for anemia, as she states that she \"becomes anemic frequently, and would like to be certain that she is not\". Review of Systems   Constitutional: Negative for fever. HENT: Negative for congestion. Gastrointestinal: Negative for abdominal pain, diarrhea and vomiting. Genitourinary: Positive for dysuria. Physical Exam   Constitutional: She is oriented to person, place, and time. She appears well-developed and well-nourished. HENT:   Head: Normocephalic and atraumatic. Cardiovascular: Normal rate, regular rhythm and normal heart sounds. Pulmonary/Chest: Effort normal and breath sounds normal.   Abdominal: Soft. Bowel sounds are normal. She exhibits no distension and no mass. There is no tenderness. There is no rebound, no guarding and no CVA tenderness. Neurological: She is alert and oriented to person, place, and time. Skin: Skin is warm and dry. Psychiatric: She has a normal mood and affect. Her behavior is normal.       ASSESSMENT and PLAN    ICD-10-CM ICD-9-CM    1. Urinary tract infection without hematuria, site unspecified N39.0 599.0 AMB POC URINALYSIS DIP STICK AUTO W/O MICRO      CBC W/O DIFF      CULTURE, URINE      nitrofurantoin, macrocrystal-monohydrate, (MACROBID) 100 mg capsule     Will notify patient when labs and urine culture return  Educated about staying well hydrated, and treating fever as needed    Pt informed to return to office with worsening of symptoms, or PRN with any questions or concerns. Pt verbalizes understanding of plan of care and denies further questions or concerns at this time.

## 2019-04-25 NOTE — PATIENT INSTRUCTIONS
Urinary Tract Infection in Women: Care Instructions  Your Care Instructions    A urinary tract infection, or UTI, is a general term for an infection anywhere between the kidneys and the urethra (where urine comes out). Most UTIs are bladder infections. They often cause pain or burning when you urinate. UTIs are caused by bacteria and can be cured with antibiotics. Be sure to complete your treatment so that the infection goes away. Follow-up care is a key part of your treatment and safety. Be sure to make and go to all appointments, and call your doctor if you are having problems. It's also a good idea to know your test results and keep a list of the medicines you take. How can you care for yourself at home? · Take your antibiotics as directed. Do not stop taking them just because you feel better. You need to take the full course of antibiotics. · Drink extra water and other fluids for the next day or two. This may help wash out the bacteria that are causing the infection. (If you have kidney, heart, or liver disease and have to limit fluids, talk with your doctor before you increase your fluid intake.)  · Avoid drinks that are carbonated or have caffeine. They can irritate the bladder. · Urinate often. Try to empty your bladder each time. · To relieve pain, take a hot bath or lay a heating pad set on low over your lower belly or genital area. Never go to sleep with a heating pad in place. To prevent UTIs  · Drink plenty of water each day. This helps you urinate often, which clears bacteria from your system. (If you have kidney, heart, or liver disease and have to limit fluids, talk with your doctor before you increase your fluid intake.)  · Urinate when you need to. · Urinate right after you have sex. · Change sanitary pads often. · Avoid douches, bubble baths, feminine hygiene sprays, and other feminine hygiene products that have deodorants.   · After going to the bathroom, wipe from front to back.  When should you call for help? Call your doctor now or seek immediate medical care if:    · Symptoms such as fever, chills, nausea, or vomiting get worse or appear for the first time.     · You have new pain in your back just below your rib cage. This is called flank pain.     · There is new blood or pus in your urine.     · You have any problems with your antibiotic medicine.    Watch closely for changes in your health, and be sure to contact your doctor if:    · You are not getting better after taking an antibiotic for 2 days.     · Your symptoms go away but then come back. Where can you learn more? Go to http://krysten-ramo.info/. Enter Y585 in the search box to learn more about \"Urinary Tract Infection in Women: Care Instructions. \"  Current as of: March 20, 2018  Content Version: 11.9  © 5960-8687 [employer]. Care instructions adapted under license by [employer] (which disclaims liability or warranty for this information). If you have questions about a medical condition or this instruction, always ask your healthcare professional. [address] any warranty or liability for your use of this information.

## 2019-04-27 LAB
BACTERIA UR CULT: NO GROWTH
BACTERIA UR CULT: NORMAL
ERYTHROCYTE [DISTWIDTH] IN BLOOD BY AUTOMATED COUNT: 12.8 % (ref 12.3–15.4)
HCT VFR BLD AUTO: 43.8 % (ref 34–46.6)
HGB BLD-MCNC: 13.7 G/DL (ref 11.1–15.9)
MCH RBC QN AUTO: 27.5 PG (ref 26.6–33)
MCHC RBC AUTO-ENTMCNC: 31.3 G/DL (ref 31.5–35.7)
MCV RBC AUTO: 88 FL (ref 79–97)
PLATELET # BLD AUTO: 266 X10E3/UL (ref 150–379)
RBC # BLD AUTO: 4.98 X10E6/UL (ref 3.77–5.28)
WBC # BLD AUTO: 5.5 X10E3/UL (ref 3.4–10.8)

## 2019-04-29 ENCOUNTER — TELEPHONE (OUTPATIENT)
Dept: FAMILY MEDICINE CLINIC | Age: 29
End: 2019-04-29

## 2019-04-29 NOTE — TELEPHONE ENCOUNTER
----- Message from María Elena Spain NP sent at 4/29/2019  7:40 AM EDT -----  Please call patient and let her know that her labs returned.   No anemia, and no UTI  She can stop antibiotics as prescribed  Thanks

## 2019-04-29 NOTE — PROGRESS NOTES
Please call patient and let her know that her labs returned.   No anemia, and no UTI  She can stop antibiotics as prescribed  Thanks

## 2019-04-30 NOTE — H&P
[address] 
[phone] History and Physical  
 
NAME: Juan Daniel Shirley :  1990 MRN:  [id number] HPI:    
30year old female who presents with a complaint of Abdominal Pain. The onset of the abdominal pain has been chronic and has been occurring in an intermittent pattern for months with a recurrent course . The abdominal pain is described as moderate sharp pain and crampy and  is described as being located in the epigastrium .  The abdominal pain does not radiate. The symptoms are aggravated by meals (2 to 4 hours after eating) (greasy meals, coffee. ). The symptoms are relieved by antacids. There is no surgical history of other. Previous diagnostic tests have not included colonoscopy, EGD, ultrasound or CT scan. The patient has been using H2 antagonist. The abdominal pain is characterized as improved. Lab results show: CBC normal. Note for \"Abdominal Pain\": Aleeve for headaches Additional reasons for visit: 
 
Crohn's Disease is described as the following: The patient presents for consultation at the request of Dr. Sherif De La Garza In [place] primary [facility]). The symptoms are characterized as worse. The crohns disease is located in the ileum. Surgical history: none. The onset of the Crohn's Disease has been chronic and has been occurring in an increasing pattern for 11 years. The Crohn's Disease is described as mild. Failed treatments include: mesalamine and corticosteroids (Prednisone daily for one yr and then on pentasa for many yrs. SHe has been off medications for 6 yrs. SHe has been followed by primary GI yearly. ). Medications have included mesalamine and corticosteroids. The frequency of bowel movements has been 1 per week. The stools are mixed with blood and hard stools Patient reports blood in all bowel movements. Patient reports urgency in some bowel movements.  The symptoms have been associated with diarrhea, constipation, hematochezia, melena, weight loss ([id number] lbs -unintentional), nausea and extraintestinal manifestation (carpa tunnel and knee pain), while the symptoms have not been associated with weight gain, vomiting or fever. Previous diagnostic tests have included colonoscopy (many yrs ago), but not EGD, CT scan, UGI, barium enema or small bowel follow through. Last menstrual period: less than 4 weeks ago. Lab results: less than 1 month ago (per pt was nromal by PCP). Past Surgical History:  
Procedure Laterality Date  HX  SECTION    
 HX OVARIAN CYST REMOVAL    
 HX TONSILLECTOMY Past Medical History:  
Diagnosis Date  Asthma  Crohn's disease ([facility] [address]  Environmental and seasonal allergies  GERD (gastroesophageal reflux disease)  Ill-defined condition   
 crohn's  Migraines  Vertigo Social History Tobacco Use  Smoking status: Former Smoker Types: Cigarettes Last attempt to quit: 2019 Years since quittin.0  Smokeless tobacco: Never Used Substance Use Topics  Alcohol use: Yes Alcohol/week: 0.6 oz Types: 1 Glasses of wine per week Frequency: Monthly or less Drinks per session: 1 or 2 Binge frequency: Never  Drug use: No  
 
Allergies Allergen Reactions  Shellfish Derived Hives Family History Problem Relation Age of Onset  Diabetes Mother  Hypertension Mother  Elevated Lipids Mother  Hypertension Father  Elevated Lipids Father No current facility-administered medications for this encounter. Current Outpatient Medications Medication Sig  
 nitrofurantoin, macrocrystal-monohydrate, (MACROBID) 100 mg capsule Take 1 Cap by mouth two (2) times a day for 5 days.  LORazepam (ATIVAN) 0.5 mg tablet Take 0.5 Tabs by mouth daily as needed for Anxiety. For severe anxiety/panic.  busPIRone (BUSPAR) 10 mg tablet Take 1 Tab by mouth two (2) times a day.  albuterol (PROVENTIL HFA, VENTOLIN HFA, PROAIR HFA) 90 mcg/actuation inhaler Take 2 Puffs by inhalation every four (4) hours as needed for Wheezing.  Arm Brace (NEOPRENE WRIST SPLINT SUPPORT) misc Use on left wrist as recommended.  raNITIdine (ZANTAC) 150 mg tablet Take 1 Tab by mouth two (2) times a day. PHYSICAL EXAM: 
General: WD, WN. Alert, cooperative, no acute distress   
HEENT: NC, Atraumatic. PERRLA, EOMI. Anicteric sclerae. Lungs:  CTA Bilaterally. No Wheezing/Rhonchi/Rales. Heart:  Regular  rhythm,  No murmur, No Rubs, No Gallops Abdomen: Soft, Non distended, Non tender.  +Bowel sounds, no HSM Extremities: No c/c/e Neurologic:  CN 2-12 gi, Alert and oriented X 3. No acute neurological distress Psych:   Good insight. Not anxious nor agitated. Assessment:  
I have reviewed with the patient +/- family alternatives,benefits and risks for the procedure, as well as potential complications(with emphasis on, but not limited to, bleeding, perforation, cardiovascular/cerebrovascular/pulmonary events, reactions to the medications, infection, risk of missing a lesions/a cancer, and the imponderables including death), alternative options, and patient/family voices understanding. Plan:  
· Endoscopic procedure · Conscious sedation or MAC

## 2019-05-01 ENCOUNTER — HOSPITAL ENCOUNTER (OUTPATIENT)
Age: 29
Setting detail: OUTPATIENT SURGERY
Discharge: HOME | End: 2019-05-01
Attending: INTERNAL MEDICINE | Admitting: INTERNAL MEDICINE
Payer: COMMERCIAL

## 2019-05-01 ENCOUNTER — ANESTHESIA EVENT (OUTPATIENT)
Dept: ENDOSCOPY | Age: 29
End: 2019-05-01
Payer: COMMERCIAL

## 2019-05-01 ENCOUNTER — ANESTHESIA (OUTPATIENT)
Dept: ENDOSCOPY | Age: 29
End: 2019-05-01
Payer: COMMERCIAL

## 2019-05-01 VITALS
BODY MASS INDEX: 21.85 KG/M2 | SYSTOLIC BLOOD PRESSURE: 93 MMHG | HEART RATE: 78 BPM | TEMPERATURE: 97.5 F | OXYGEN SATURATION: 100 % | DIASTOLIC BLOOD PRESSURE: 66 MMHG | HEIGHT: 64 IN | WEIGHT: 128 LBS | RESPIRATION RATE: 16 BRPM

## 2019-05-01 LAB
H PYLORI FROM TISSUE: NEGATIVE
HCG UR QL: NEGATIVE
KIT LOT NO., HCLOLOT: NORMAL
NEGATIVE CONTROL: NEGATIVE
POSITIVE CONTROL: POSITIVE

## 2019-05-01 PROCEDURE — 88305 TISSUE EXAM BY PATHOLOGIST: CPT

## 2019-05-01 PROCEDURE — 74011250636 HC RX REV CODE- 250/636: Performed by: INTERNAL MEDICINE

## 2019-05-01 PROCEDURE — 76040000019: Performed by: INTERNAL MEDICINE

## 2019-05-01 PROCEDURE — 87077 CULTURE AEROBIC IDENTIFY: CPT | Performed by: INTERNAL MEDICINE

## 2019-05-01 PROCEDURE — 81025 URINE PREGNANCY TEST: CPT

## 2019-05-01 PROCEDURE — 77030021593 HC FCPS BIOP ENDOSC BSC -A: Performed by: INTERNAL MEDICINE

## 2019-05-01 PROCEDURE — 74011250636 HC RX REV CODE- 250/636

## 2019-05-01 PROCEDURE — 74011250637 HC RX REV CODE- 250/637: Performed by: INTERNAL MEDICINE

## 2019-05-01 PROCEDURE — 76060000031 HC ANESTHESIA FIRST 0.5 HR: Performed by: INTERNAL MEDICINE

## 2019-05-01 RX ORDER — EPINEPHRINE 0.1 MG/ML
1 INJECTION INTRACARDIAC; INTRAVENOUS
Status: DISCONTINUED | OUTPATIENT
Start: 2019-05-01 | End: 2019-05-01 | Stop reason: HOSPADM

## 2019-05-01 RX ORDER — DEXTROMETHORPHAN/PSEUDOEPHED 2.5-7.5/.8
1.2 DROPS ORAL
Status: DISCONTINUED | OUTPATIENT
Start: 2019-05-01 | End: 2019-05-01 | Stop reason: HOSPADM

## 2019-05-01 RX ORDER — FLUMAZENIL 0.1 MG/ML
0.2 INJECTION INTRAVENOUS
Status: DISCONTINUED | OUTPATIENT
Start: 2019-05-01 | End: 2019-05-01 | Stop reason: HOSPADM

## 2019-05-01 RX ORDER — LIDOCAINE HYDROCHLORIDE 20 MG/ML
INJECTION, SOLUTION EPIDURAL; INFILTRATION; INTRACAUDAL; PERINEURAL AS NEEDED
Status: DISCONTINUED | OUTPATIENT
Start: 2019-05-01 | End: 2019-05-01 | Stop reason: HOSPADM

## 2019-05-01 RX ORDER — MESALAMINE 500 MG/1
1000 CAPSULE, EXTENDED RELEASE ORAL
Qty: 360 CAP | Refills: 0 | Status: SHIPPED | OUTPATIENT
Start: 2019-05-01 | End: 2019-07-30

## 2019-05-01 RX ORDER — MIDAZOLAM HYDROCHLORIDE 1 MG/ML
.25-5 INJECTION, SOLUTION INTRAMUSCULAR; INTRAVENOUS
Status: DISCONTINUED | OUTPATIENT
Start: 2019-05-01 | End: 2019-05-01 | Stop reason: HOSPADM

## 2019-05-01 RX ORDER — PROPOFOL 10 MG/ML
INJECTION, EMULSION INTRAVENOUS AS NEEDED
Status: DISCONTINUED | OUTPATIENT
Start: 2019-05-01 | End: 2019-05-01 | Stop reason: HOSPADM

## 2019-05-01 RX ORDER — SODIUM CHLORIDE 9 MG/ML
50 INJECTION, SOLUTION INTRAVENOUS CONTINUOUS
Status: DISCONTINUED | OUTPATIENT
Start: 2019-05-01 | End: 2019-05-01 | Stop reason: HOSPADM

## 2019-05-01 RX ORDER — SODIUM CHLORIDE 9 MG/ML
INJECTION, SOLUTION INTRAVENOUS
Status: DISCONTINUED | OUTPATIENT
Start: 2019-05-01 | End: 2019-05-01 | Stop reason: HOSPADM

## 2019-05-01 RX ORDER — NALOXONE HYDROCHLORIDE 0.4 MG/ML
0.4 INJECTION, SOLUTION INTRAMUSCULAR; INTRAVENOUS; SUBCUTANEOUS
Status: DISCONTINUED | OUTPATIENT
Start: 2019-05-01 | End: 2019-05-01 | Stop reason: HOSPADM

## 2019-05-01 RX ORDER — BISMUTH SUBSALICYLATE 262 MG
1 TABLET,CHEWABLE ORAL DAILY
COMMUNITY
End: 2020-07-27

## 2019-05-01 RX ORDER — ATROPINE SULFATE 0.1 MG/ML
0.5 INJECTION INTRAVENOUS
Status: DISCONTINUED | OUTPATIENT
Start: 2019-05-01 | End: 2019-05-01 | Stop reason: HOSPADM

## 2019-05-01 RX ADMIN — PROPOFOL 50 MG: 10 INJECTION, EMULSION INTRAVENOUS at 09:36

## 2019-05-01 RX ADMIN — SODIUM CHLORIDE 50 ML/HR: 900 INJECTION, SOLUTION INTRAVENOUS at 08:11

## 2019-05-01 RX ADMIN — PROPOFOL 50 MG: 10 INJECTION, EMULSION INTRAVENOUS at 09:32

## 2019-05-01 RX ADMIN — LIDOCAINE HYDROCHLORIDE 100 MG: 20 INJECTION, SOLUTION EPIDURAL; INFILTRATION; INTRACAUDAL; PERINEURAL at 09:21

## 2019-05-01 RX ADMIN — PROPOFOL 100 MG: 10 INJECTION, EMULSION INTRAVENOUS at 09:21

## 2019-05-01 RX ADMIN — SODIUM CHLORIDE: 9 INJECTION, SOLUTION INTRAVENOUS at 08:43

## 2019-05-01 RX ADMIN — PROPOFOL 50 MG: 10 INJECTION, EMULSION INTRAVENOUS at 09:22

## 2019-05-01 RX ADMIN — PROPOFOL 50 MG: 10 INJECTION, EMULSION INTRAVENOUS at 09:44

## 2019-05-01 RX ADMIN — PROPOFOL 50 MG: 10 INJECTION, EMULSION INTRAVENOUS at 09:24

## 2019-05-01 RX ADMIN — SIMETHICONE 80 MG: 20 SUSPENSION/ DROPS ORAL at 09:20

## 2019-05-01 NOTE — PERIOP NOTES
EGD and colonoscopy Procedure being performed under MAC; ELSA Magana at bedside monitoring patient. See anesthesia notes. Endoscope was pre-cleaned at bedside immediately following procedure by [facility]. Care of patient assumed from the anesthesia provider. Patient tolerated procedure well. Abdomen remains soft and non tender post procedure, no complaints or indication of discomfort noted at this time. See anesthesia note. Patient transferred to Endoscopy Recovery and report given to recovery nurse Ramon Donovan.

## 2019-05-01 NOTE — DISCHARGE INSTRUCTIONS
[address] [facility] Sathya Cabrera    [phone]      Colonoscopy  and Egd Discharge Instructions    2019    Ligia Enedina  :  1990  Karina Medical Record Number:  [id number]      Discomfort:  Sore throat- throat lozenges or warm salt water gargle  Redness at IV site- apply warm compress to area; if redness or soreness persist- contact your physician  There may be a slight amount of blood passed from the rectum  Gaseous discomfort- walking, belching will help relieve any discomfort  You may not operate a vehicle for 12 hours  You may not engage in an occupation involving machinery or appliances for rest of today  You may not drink alcoholic beverages for at least 12 hours  Avoid making any critical decisions for at least 24 hour  DIET:   GERD diet: avoid fried and fatty foods. peppermint, chocolate, alcohol, coffee, citrus fruits and juices, tomoato products; avoid lying down for 2 to 3 hours after eating.   - however -  remember your colon is empty and a heavy meal will produce gas. Avoid these foods:  vegetables, fried / greasy foods, carbonated drinks for today     ACTIVITY:  You may  resume your normal daily activities it is recommended that you spend the remainder of the day resting -  avoid any strenuous activity and driving. CALL M.D. ANY SIGN OF:   Increasing pain, nausea, vomiting  Abdominal distension (swelling)  New increased bleeding (oral or rectal)  Fever (chills)  Pain in chest area  Bloody discharge from nose or mouth  Shortness of breath        Ligia Mcconnell  [id number]  1990      Follow-up Instructions:   Call Dr. Benjy Thomson if any questions at [phone]. Results of procedure / biopsy in 7 to 10 days, we will call you with these results.   Your endoscopy showed normal UGI endoscopy   Your colonoscopy showed normal colonoscopy except for a single small erosion in the small bowel ( ileitis- minimal)      DISCHARGE SUMMARY from Nurse    The following personal items collected during your admission are returned to you:   Dental Appliance: Dental Appliances: None  Vision: Visual Aid: None  Hearing Aid:    Jewelry:    Clothing:    Other Valuables:    Valuables sent to safe:      PATIENT INSTRUCTIONS:    Take Home Medications:  {Medication reconciliation information is now added to the patient's AVS automatically when it is printed. There is no need to use this SmartLink in discharge instructions.   Highlight this text and delete it to clear this message}    Pentasa 500 mg x2 po BID ( take two tablets twice daily)

## 2019-05-01 NOTE — ROUTINE PROCESS
Canelo Pelaez 
1990 
[id number] Situation: 
Verbal report received from: Amaya Weller RN Procedure: Procedure(s): ESOPHAGOGASTRODUODENAL (EGD) BIOPSY COLONOSCOPY 
ESOPHAGOGASTRODUODENOSCOPY (EGD) Background: 
 
Preoperative diagnosis: ABDOMINAL PAIN Postoperative diagnosis: * No post-op diagnosis entered * :  Dr. Jaime Patton Assistant(s): Endoscopy Technician-1: Carmine Busch Endoscopy RN-1: Flash Rangel Specimens:  
ID Type Source Tests Collected by Time Destination 1 : bx duodenum Preservative   Tawny Otero MD 5/1/2019 3984 Pathology 2 : bx random esophageal Presshavonative   Tawny Otero MD 5/1/2019 1385 Pathology 3 : TI bx Preservative   Tawny Otero MD 5/1/2019 2405 Pathology 4 : random colon bx Joan Otero MD 5/1/2019 9173 Pathology H. Pylori  yes Assessment: 
Intra-procedure medications Anesthesia gave intra-procedure sedation and medications, see anesthesia flow sheet yes Intravenous fluids: NS@ Karen Gault Vital signs stable Abdominal assessment: round and soft Recommendation: 
Discharge patient per MD order. Family or Friend Permission to share finding with family or friend yes

## 2019-05-01 NOTE — PROGRESS NOTES
Dr Sam Ambriz in to speak to patient and friend Italia Rater Discharge instructions given bedside with patient and friend

## 2019-05-01 NOTE — PROCEDURES
[address] [facility] [address]  [address] [facility], [address] [facility]  [phone]                   Colonoscopy Operative Report      Indications:    Abdominal pain, generalized     :  Adeline Antonio MD    Assistants: None    Referring Provider: Danilo Freeman MD    Sedation:  MAC anesthesia Propofol    Procedure Details:  After informed consent was obtained with all risks and benefits of procedure explained and preoperative exam completed, the patient was taken to the endoscopy suite and placed in the left lateral decubitus position. Upon sequential sedation as per above, a digital rectal exam was performed  And was normal.  The Olympus videocolonoscope  was inserted in the rectum and carefully advanced to the cecum, which was identified by the ileocecal valve and appendiceal orifice, terminal ileum. The quality of preparation was excellent. The colonoscope was slowly withdrawn with careful evaluation between folds. Retroflexion in the rectum was performed and was normal..     Findings:   Rectum: Grade 1 internal hemorrhoid(s); Sigmoid: normal  Descending Colon: normal  Transverse Colon: normal  Ascending Colon: normal  Cecum: normal  Terminal Ileum:  A single < 5mm  ( diminutive) erosion seen. Otherwise normal mucosa seen    Interventions:  biopsy of colon random and terminal ileum    Specimen Removed:  as above    Implants: none    Complications: None. EBL:  None. Recommendations:   -Await pathology.  -Resume previous diet.  -Follow up in the office as scheduled. -Repeat colonoscopy in 10 years     -Resume normal medication(s)  -NO NSAIDs  -TRial of pentasa  -M2a capsule endoscopy to evaluate rest of small bowel- (patency capsule first -call office to [facility])      Discharge Disposition:  Home in the company of a  when able to ambulate.     Adeline Antonio MD  5/1/2019  9:54 AM

## 2019-05-01 NOTE — ANESTHESIA PREPROCEDURE EVALUATION
Relevant Problems No relevant active problems Anesthetic History No history of anesthetic complications Review of Systems / Medical History Patient summary reviewed, nursing notes reviewed and pertinent labs reviewed Pulmonary Asthma Neuro/Psych Psychiatric history Cardiovascular Within defined limits GI/Hepatic/Renal 
  
GERD Comments: crohns disease Endo/Other Within defined limits Other Findings Physical Exam 
 
Airway Mallampati: I Neck ROM: normal range of motion Mouth opening: Normal 
 
 Cardiovascular Rhythm: regular Rate: normal 
 
 
 
 Dental 
No notable dental hx Pulmonary Breath sounds clear to auscultation Abdominal 
GI exam deferred Other Findings Anesthetic Plan ASA: 2 Anesthesia type: MAC Induction: Intravenous Anesthetic plan and risks discussed with: Patient

## 2019-05-01 NOTE — PROCEDURES
[address]  [address] [facility], [address] [facility]       [phone]                   2019                EGD Operative Report  Marleny Perez  :  1990  Luisito Mane Medical Record Number:  [id number]      Indication:  Abdominal pain, epigastric, Dyspepsia-acid     : Christine Pinto MD    Assistants: None    Referring Provider:  Caro Michelle MD      Anesthesia/Sedation:  MAC anesthesia Propofol    Airway assessment: No airway problems anticipated    Pre-Procedural Exam:      Airway: clear, no airway problems anticipated  Heart: RRR, without gallops or rubs  Lungs: clear bilaterally without wheezes, crackles, or rhonchi  Abdomen: soft, nontender, nondistended, bowel sounds present  Mental Status: awake, alert and oriented to person, place and time       Procedure Details     After infomed consent was obtained for the procedure, with all risks and benefits of procedure explained the patient was taken to the endoscopy suite and placed in the left lateral decubitus position. Following sequential administration of sedation as per above, the endoscope was inserted into the mouth and advanced under direct vision to second portion of the duodenum. A careful inspection was made as the gastroscope was withdrawn, including a retroflexed view of the proximal stomach; findings and interventions are described below. Findings:   Esophagus:normal mucosa noted. Bx taken randomly for histology  Stomach: normal mucosa. BUCK test performed  Duodenum/jejunum: normal mucosa. Bx taken for histology    Therapies:   biopsy of esophagus-random  biopsy of stomach body  biopsy of duodenal radom    Specimens:  esophagus, BUCK test and duodenal bx. Implants: none           Complications:   None; patient tolerated the procedure well. EBL:  None.            Impression:    Normal UGI endoscopy    Recommendations:    -Await BUCK test result and treat for Helicobacter pylori if positive. ,  -GERD diet: avoid fried and fatty foods.  peppermint, chocolate, alcohol, coffee, citrus fruits and juices, tomoato products; avoid lying down for 2 to 3 hours after eating.,   -No NSAIDS  -Colonoscopy today    Saleem Arechiga MD

## 2019-05-01 NOTE — ANESTHESIA POSTPROCEDURE EVALUATION
Procedure(s): ESOPHAGOGASTRODUODENAL (EGD) BIOPSY COLONOSCOPY 
ESOPHAGOGASTRODUODENOSCOPY (EGD) COLON BIOPSY. MAC Anesthesia Post Evaluation Multimodal analgesia: multimodal analgesia not used between 6 hours prior to anesthesia start to PACU discharge Patient location during evaluation: bedside Patient participation: complete - patient participated Level of consciousness: awake Pain management: adequate Airway patency: patent Anesthetic complications: no 
Cardiovascular status: acceptable Respiratory status: acceptable Hydration status: acceptable Post anesthesia nausea and vomiting:  controlled Vitals Value Taken Time /74 5/1/2019 10:08 AM  
Temp Pulse 70 5/1/2019 10:10 AM  
Resp 15 5/1/2019 10:10 AM  
SpO2 100 % 5/1/2019 10:10 AM  
Vitals shown include unvalidated device data.

## 2019-11-12 RX ORDER — ALBUTEROL SULFATE 90 UG/1
AEROSOL, METERED RESPIRATORY (INHALATION)
Qty: 18 INHALER | Refills: 2 | Status: SHIPPED | OUTPATIENT
Start: 2019-11-12 | End: 2020-03-14

## 2019-12-28 DIAGNOSIS — J30.89 ENVIRONMENTAL AND SEASONAL ALLERGIES: ICD-10-CM

## 2019-12-28 DIAGNOSIS — K21.9 GASTROESOPHAGEAL REFLUX DISEASE WITHOUT ESOPHAGITIS: ICD-10-CM

## 2020-01-07 RX ORDER — RANITIDINE 150 MG/1
TABLET, FILM COATED ORAL
Qty: 60 TAB | Refills: 5 | Status: SHIPPED | OUTPATIENT
Start: 2020-01-07 | End: 2020-04-30 | Stop reason: RX

## 2020-01-07 RX ORDER — CETIRIZINE HCL 10 MG
TABLET ORAL
Qty: 30 TAB | Refills: 5 | Status: SHIPPED | OUTPATIENT
Start: 2020-01-07 | End: 2020-05-04 | Stop reason: ALTCHOICE

## 2020-03-14 RX ORDER — ALBUTEROL SULFATE 90 UG/1
AEROSOL, METERED RESPIRATORY (INHALATION)
Qty: 18 INHALER | Refills: 0 | Status: SHIPPED | OUTPATIENT
Start: 2020-03-14 | End: 2020-05-04

## 2020-04-04 ENCOUNTER — NURSE TRIAGE (OUTPATIENT)
Dept: OTHER | Facility: CLINIC | Age: 30
End: 2020-04-04

## 2020-04-04 NOTE — TELEPHONE ENCOUNTER
Reason for Disposition   Sinus pain (not just congestion) and fever    Answer Assessment - Initial Assessment Questions  1. LOCATION: \"Where does it hurt? \"       Between the eyes  2. ONSET: \"When did the sinus pain start? \"  (e.g., hours, days)       It started about a month ago  3. SEVERITY: \"How bad is the pain? \"   (Scale 1-10; mild, moderate or severe)    - MILD (1-3): doesn't interfere with normal activities     - MODERATE (4-7): interferes with normal activities (e.g., work or school) or awakens from sleep    - SEVERE (8-10): excruciating pain and patient unable to do any normal activities         Rates 4/10  4. RECURRENT SYMPTOM: \"Have you ever had sinus problems before? \" If so, ask: \"When was the last time? \" and \"What happened that time? \"       *No Answer*  5. NASAL CONGESTION: \"Is the nose blocked? \" If so, ask, \"Can you open it or must you breathe through the mouth? \"      *No Answer*  6. NASAL DISCHARGE: \"Do you have discharge from your nose? \" If so ask, \"What color? \"      Yes, it is clear  7. FEVER: \"Do you have a fever? \" If so, ask: \"What is it, how was it measured, and when did it start? \"       100 oral  8. OTHER SYMPTOMS: \"Do you have any other symptoms? \" (e.g., sore throat, cough, earache, difficulty breathing)      gen body aches, HA, congestion  9. PREGNANCY: \"Is there any chance you are pregnant? \" \"When was your last menstrual period? \"      no    Protocols used: SINUS PAIN AND CONGESTION-ADULT-[place]    Pt with symptoms as documented above. Pt informed of disposition. Pt educated on [employer], web site and/or [email or web address]. Pt educated on precautions/hand hygiene/ social distancing.

## 2020-04-30 ENCOUNTER — TELEPHONE (OUTPATIENT)
Dept: FAMILY MEDICINE CLINIC | Age: 30
End: 2020-04-30

## 2020-04-30 DIAGNOSIS — K21.9 GASTROESOPHAGEAL REFLUX DISEASE WITHOUT ESOPHAGITIS: Primary | ICD-10-CM

## 2020-04-30 RX ORDER — FAMOTIDINE 20 MG/1
20 TABLET, FILM COATED ORAL 2 TIMES DAILY
Qty: 60 TAB | Refills: 5 | Status: SHIPPED | OUTPATIENT
Start: 2020-04-30 | End: 2020-05-04 | Stop reason: ALTCHOICE

## 2020-04-30 NOTE — TELEPHONE ENCOUNTER
Pepcid 20 mg twice daily. New Rx sent. Orders Placed This Encounter    famotidine (PEPCID) 20 mg tablet     Sig: Take 1 Tab by mouth two (2) times a day.      Dispense:  60 Tab     Refill:  5

## 2020-05-04 ENCOUNTER — TELEPHONE (OUTPATIENT)
Dept: CARDIOLOGY CLINIC | Age: 30
End: 2020-05-04

## 2020-05-04 ENCOUNTER — VIRTUAL VISIT (OUTPATIENT)
Dept: FAMILY MEDICINE CLINIC | Age: 30
End: 2020-05-04

## 2020-05-04 VITALS — BODY MASS INDEX: 21.97 KG/M2 | HEIGHT: 64 IN

## 2020-05-04 DIAGNOSIS — G56.03 BILATERAL CARPAL TUNNEL SYNDROME: ICD-10-CM

## 2020-05-04 DIAGNOSIS — R00.2 PALPITATIONS: Primary | ICD-10-CM

## 2020-05-04 NOTE — PROGRESS NOTES
Identified pt with two pt identifiers(name and ). Chief Complaint   Patient presents with    Palpitations     started a week ago occurring every other day - had sx in the past when she was pregnant very seldomly (delivered three months ago)  - has hx of anxiety and is struggling with a little bit of anxiety regarding work but sx will occur even without her feeling anxious - had sx as a child and was placed on a heart monitor but nothing ever came of it - also noted that she had to quit working as of yesterday b/c baby will not take a bottle    Dizziness     has hx vertigo but also experiencing dizziness    Carpal Tunnel     carpal tunnel has become worse and pt having more difficulty with  reflex         Health Maintenance Due   Topic    Pneumococcal 0-64 years (1 of 1 - PPSV23)       Wt Readings from Last 3 Encounters:   19 128 lb (58.1 kg)   19 126 lb 9.6 oz (57.4 kg)   19 127 lb (57.6 kg)     Temp Readings from Last 3 Encounters:   19 97.5 °F (36.4 °C)   19 97.8 °F (36.6 °C) (Oral)   19 98.7 °F (37.1 °C) (Oral)     BP Readings from Last 3 Encounters:   19 93/66   19 106/74   19 100/64     Pulse Readings from Last 3 Encounters:   19 78   19 73   19 98         Learning Assessment:  :     Learning Assessment 2018   PRIMARY LEARNER Patient   BARRIERS PRIMARY LEARNER NONE   CO-LEARNER CAREGIVER No   PRIMARY LANGUAGE ENGLISH   LEARNER PREFERENCE PRIMARY DEMONSTRATION     LISTENING   ANSWERED BY patient   RELATIONSHIP SELF       Depression Screening:  :     3 most recent PHQ Screens 2020   Little interest or pleasure in doing things Not at all   Feeling down, depressed, irritable, or hopeless Not at all   Total Score PHQ 2 0       Fall Risk Assessment:  :     No flowsheet data found. Abuse Screening:  :     Abuse Screening Questionnaire 2020 3/7/2019 2018   Do you ever feel afraid of your partner?  N N N   Are you in a relationship with someone who physically or mentally threatens you? N N N   Is it safe for you to go home? Y Y Y       Coordination of Care Questionnaire:  :     1) Have you been to an emergency room, urgent care clinic since your last visit? no   Hospitalized since your last visit? no             2) Have you seen or consulted any other health care providers outside of [address] [place] [address] since your last visit? no  (Include any pap smears or colon screenings in this section.)    3) Do you have an Advance Directive on file? no  Are you interested in receiving information about Advance Directives? no      Reviewed record in preparation for visit and have obtained necessary documentation. Medication reconciliation up to date and corrected with patient at this time.

## 2020-05-04 NOTE — TELEPHONE ENCOUNTER
Pt is calling to get a new Pt appt meaghan please call her at [phone] she is being ref by Dr. Paty Baltazar records are in [address] [place] [address]

## 2020-05-04 NOTE — PROGRESS NOTES
Ephraim Reyes is a 35 y.o. female who was seen by synchronous (real-time) audio-video technology on 5/4/2020. Consent: Ephraim Reyes, who was seen by synchronous (real-time) audio-video technology, and/or her healthcare decision maker, is aware that this patient-initiated, Telehealth encounter on 5/4/2020 is a billable service, with coverage as determined by her insurance carrier. She is aware that she may receive a bill and has provided verbal consent to proceed: Yes. Assessment & Plan:   Diagnoses and all orders for this visit:    1. Palpitations: has h/o anxiety, but states that this has been okay. No known history of thyroid disorder. Recommend cardiology evaluation and information provided. -     REFERRAL TO CARDIOLOGY    2. Bilateral carpal tunnel syndrome: no improvement with use of wrist splints. Refer to ortho for further evaluation.   -     REFERRAL TO ORTHOPEDICS      Subjective:   Ephriam Reyes is a 35 y.o. female who was seen for Palpitations (started a week ago occurring every other day - had sx in the past when she was pregnant very seldomly (delivered three months ago)  - has hx of anxiety and is struggling with a little bit of anxiety regarding work but sx will occur even without her feeling anxious - had sx as a child and was placed on a heart monitor but nothing ever came of it - also noted that she had to quit working as of yesterday b/c baby will not take a bottle); Dizziness (has hx vertigo but also experiencing dizziness); and Carpal Tunnel (carpal tunnel has become worse and pt having more difficulty with  reflex)    Palpitations  Patient complains of palpitations. Reports h/o intermittent palpitations which has increased in both duration and frequency over the last week. Occurring daily and lasts for a few seconds before resolving. Associated with dizziness. Denies chest pain or discomfort, shortness of breath, syncope. Does not feel anxious during episodes.  Consumes one cup of coffee per day. No known triggers. Bilateral carpal tunnel: bilateral wrists, having weakness in her , unable to open bottle of juice due to not being able to  tight enough. She has the wrist splint which she she has been trying to wear, but finding difficulty to do with breastfeeding. Prior to Admission medications    Medication Sig Start Date End Date Taking? Authorizing Provider   Ca comb no.1/vit D3/B6/FA/B12 (HEARTBURN & ACID REFLUX PO) Take 1 Tab by mouth once over twenty-four (24) hours. Yes Provider, Historical   LECITHIN, BULK, by Does Not Apply route. Yes Provider, Historical   multivitamin (ONE A DAY) tablet Take 1 Tab by mouth daily. Yes Provider, Historical   famotidine (PEPCID) 20 mg tablet Take 1 Tab by mouth two (2) times a day. 4/30/20 5/4/20  Ean Mueller MD   albuterol (PROVENTIL HFA, VENTOLIN HFA, PROAIR HFA) 90 mcg/actuation inhaler INHALE 2 PUFFS BY MOUTH EVERY 4 HOURS AS NEEDED FOR WHEEZING 3/14/20 0/5/60  Shilo Thornton MD   cetirizine (ZYRTEC) 10 mg tablet TAKE 1 TABLET BY MOUTH EVERY DAY 1/7/20 5/4/20  Ean Mueller MD   LORazepam (ATIVAN) 0.5 mg tablet Take 0.5 Tabs by mouth daily as needed for Anxiety. For severe anxiety/panic. 4/11/19 5/4/20  Ean Mueller MD   busPIRone (BUSPAR) 10 mg tablet Take 1 Tab by mouth two (2) times a day. 3/7/19 5/4/20  Ean Mueller MD   Arm Brace (NEOPRENE WRIST SPLINT SUPPORT) misc Use on left wrist as recommended.  2/11/19 5/4/20  Ean Mueller MD     Allergies   Allergen Reactions    Shellfish Derived Hives       Past Medical History:   Diagnosis Date    Asthma     Carpal tunnel syndrome     bilateral    Crohn's disease ([address]     Environmental and seasonal allergies     GERD (gastroesophageal reflux disease)     Ill-defined condition     crohn's    Migraines     Psychiatric disorder     anxiety    PUD (peptic ulcer disease)     Vertigo      Social History     Tobacco Use    Smoking status: Former Smoker     Types: Cigarettes     Last attempt to quit: 2019     Years since quittin.0    Smokeless tobacco: Never Used   Substance Use Topics    Alcohol use: Yes     Alcohol/week: 1.0 standard drinks     Types: 1 Glasses of wine per week     Frequency: Monthly or less     Drinks per session: 1 or 2     Binge frequency: Never       ROS  Pertinent items noted in HPI     Objective:     Visit Vitals  Ht 5' 4\" (1.626 m)   BMI 21.97 kg/m²      General: alert, cooperative, no distress   Mental  status: normal mood, behavior, speech, dress, motor activity, and thought processes, able to follow commands   HENT: NCAT   Neck: no visualized mass   Resp: no respiratory distress   Neuro: no gross deficits   Skin: no discoloration or lesions of concern on visible areas   Psychiatric: normal affect, consistent with stated mood, no evidence of hallucinations       We discussed the expected course, resolution and complications of the diagnosis(es) in detail. Medication risks, benefits, costs, interactions, and alternatives were discussed as indicated. I advised her to contact the office if her condition worsens, changes or fails to improve as anticipated. She expressed understanding with the diagnosis(es) and plan. Kendell Miller is a 35 y.o. female who was evaluated by a video visit encounter for concerns as above. Patient identification was verified prior to start of the visit. A caregiver was present when appropriate. Due to this being a TeleHealth encounter (During [facility]- public health emergency), evaluation of the following organ systems was limited: Vitals/Constitutional/EENT/Resp/CV/GI//MS/Neuro/Skin/Heme-Lymph-Imm.   Pursuant to the emergency declaration under the [facility], [facility] waiver authority and the [employer] Act, this Virtual  Visit was conducted, with patient's (and/or legal guardian's) consent, to reduce the patient's risk of exposure to COVID-19 and provide necessary medical care. Services were provided through a video synchronous discussion virtually to substitute for in-person clinic visit. Patient and provider were located at their individual homes.       Chance Bowden MD

## 2020-06-01 NOTE — TELEPHONE ENCOUNTER
Patient wanting to reschedule appt with Dr. Dexter Barrow that she had to cancel. Please advise.      Phone: [phone]

## 2020-06-12 ENCOUNTER — OFFICE VISIT (OUTPATIENT)
Dept: CARDIOLOGY CLINIC | Age: 30
End: 2020-06-12

## 2020-06-12 VITALS
DIASTOLIC BLOOD PRESSURE: 62 MMHG | BODY MASS INDEX: 22.88 KG/M2 | OXYGEN SATURATION: 97 % | HEART RATE: 78 BPM | WEIGHT: 134 LBS | SYSTOLIC BLOOD PRESSURE: 118 MMHG | HEIGHT: 64 IN

## 2020-06-12 DIAGNOSIS — R00.2 PALPITATIONS: Primary | ICD-10-CM

## 2020-06-12 NOTE — PROGRESS NOTES
Jose Martin Wolfe is a 35 y.o. female    Chief Complaint   Patient presents with    New Patient     palpitations       Chest pain patient states some chest pain on and off     SOB No    Dizziness patient states some dizziness at times    Swelling No    Refills No    Visit Vitals  /62 (BP 1 Location: Left arm, BP Patient Position: Sitting)   Pulse 78   Ht 5' 4\" (1.626 m)   Wt 134 lb (60.8 kg)   SpO2 97%   BMI 23.00 kg/m²       1. Have you been to the ER, urgent care clinic since your last visit? Hospitalized since your last visit? No    2. Have you seen or consulted any other health care providers outside of the [address] [place] [address] since your last visit? Include any pap smears or colon screening.   No

## 2020-06-12 NOTE — PROGRESS NOTES
Marleny Allred MD    [id number] [place], [address] [facility]    Office [phone],SUE [phone]      Lowell Patel is a 35 y.o. female referred for evaluation of palpitations. Consult requested by Wallie Olszewski, MD      Primary care physician:  Wallie Olszewski, MD      Chief complaint:  Lowell Patel is a 35 y.o. female who complains of   Chief Complaint   Patient presents with    New Patient     palpitations     Dear Dr Raj Dugan,    I had the pleasure of seeing Ms Lowell Patel in the office today. Assessment:    Palpitations  Anxiety    Plan:     Echo  E cardio event monitor  Scheduled to get blood work next week. Advised to get TSH checked. Blood pressure within normal limits today. Advised to get a blood pressure machine and monitor blood pressure. Follow-up visit in 6 weeks. Echo same day    Patient understands the plan. All questions were answered to the patient's satisfaction. Medication Side Effects and Warnings were discussed with patient: yes  Patient Labs were reviewed and or requested:  yes  Patient Past Records were reviewed and or requested: yes    I appreciate the opportunity to be involved in Ms. Duval. Please see note below for details. Please do not hesitate to contact us with questions or concerns. Marleny Allred MD    Cardiac Testing/ Procedures: A. Cardiac Cath/PCI:    B.ECHO/AYO:    C.StressNuclear/Stress ECHO/Stress test:    D.Vascular:    E. EP:    F. Miscellaneous:    History of present illness:    Patient is a pleasant 25-year-old female who has been having intermittent palpitations for the past 1 to 2 months. It can occur at any time. Does not wake her from sleep. Duration is also varies. Occasional dizziness present. When she was a teenager, she has had palpitations and has 1 monitors but it was inconclusive. No presyncope/syncope. No chest pain. No family history of SCD.   States that her anxiety is under control and does not attribute the palpitations to anxiety. She has been told that her blood pressure runs low. Does not drink excessive caffeine. Past Medical History:   Diagnosis Date    Asthma     Carpal tunnel syndrome     bilateral    Crohn's disease ([address]     Environmental and seasonal allergies     GERD (gastroesophageal reflux disease)     Ill-defined condition     crohn's    Migraines     Psychiatric disorder     anxiety    PUD (peptic ulcer disease)     Vertigo       Past Surgical History:   Procedure Laterality Date    COLONOSCOPY N/A 2019    COL[facility]ia Garces [facility] at [facility] ENDOSCOPY    HX  SECTION      HX OVARIAN CYST REMOVAL      HX TONSILLECTOMY       Family History   Problem Relation Age of Onset    Diabetes Mother     Hypertension Mother     Elevated Lipids Mother     Hypertension Father    Garcia Elevated Lipids Father       Social History     Tobacco Use    Smoking status: Former Smoker     Types: Cigarettes     Last attempt to quit: 2019     Years since quittin.1    Smokeless tobacco: Never Used   Substance Use Topics    Alcohol use: Yes     Alcohol/week: 1.0 standard drinks     Types: 1 Glasses of wine per week     Frequency: Monthly or less     Drinks per session: 1 or 2     Binge frequency: Never    Drug use: No          Medications before admission:    Current Outpatient Medications   Medication Sig Dispense    Ca comb no.1/vit D3/B6/FA/B12 (HEARTBURN & ACID REFLUX PO) Take 1 Tab by mouth once over twenty-four (24) hours.  LECITHIN, BULK, by Does Not Apply route.  multivitamin (ONE A DAY) tablet Take 1 Tab by mouth daily. No current facility-administered medications for this visit.             Review of Systems:  (bold if positive, if negative)    Gen:  Eyes:  ENT:  CVS:  Pulm:  GI:  :  MS:  Skin:  Psych:  Endo:  Hem:  Renal:  Neuro:      Physical Exam:  Visit Vitals  /62 (BP 1 Location: Left arm, BP Patient Position: Sitting)   Pulse 78   Ht 5' 4\" (1.626 m)   Wt 134 lb (60.8 kg)   SpO2 97%   BMI 23.00 kg/m²          Gen: Well-developed, well-nourished, in no acute distress  HEENT:  Pink conjunctivae, hearing intact to voice, moist mucous membranes  Neck: Supple,No JVD, No Carotid Bruit, Thyroid- non tender  Resp: No accessory muscle use, Clear breath sounds, No rales or rhonchi  Card: Regular Rate,Rythm,Normal S1, S2, No murmurs, rubs or gallop. No thrills. Abd:  Soft, non-tender, non-distended, normoactive bowel sounds are present,   MSK: No cyanosis   Skin: No rashes   Neuro:  moving all four extremities, no focal deficit, follows commands appropriately  Psych:  Good insight, oriented to person, place and time, alert, Nml Affect  LE: No edema  Vascular: Radial Pulses 2+ and symmetric        EKG: Sinus rhythm, normal axis, normal intervals      Cxray:    LABS:    No results for input(s): CPK, TROIQ in the last 72 hours. No lab exists for component: CKQMB, CPKMB    Lab Results   Component Value Date/Time    WBC 5.5 04/25/2019 01:46 PM    HGB 13.7 04/25/2019 01:46 PM    HCT 43.8 04/25/2019 01:46 PM    PLATELET 780 13/60/2883 01:46 PM    MCV 88 04/25/2019 01:46 PM     Lab Results   Component Value Date/Time    Sodium 141 12/07/2018 04:00 PM    Potassium 4.3 12/07/2018 04:00 PM    Chloride 105 12/07/2018 04:00 PM    CO2 22 12/07/2018 04:00 PM    Glucose 77 12/07/2018 04:00 PM    BUN 9 12/07/2018 04:00 PM    Creatinine 0.88 12/07/2018 04:00 PM    BUN/Creatinine ratio 10 12/07/2018 04:00 PM    GFR est  12/07/2018 04:00 PM    GFR est non-AA 90 12/07/2018 04:00 PM    Calcium 9.1 12/07/2018 04:00 PM       ATTENTION:   This medical record was transcribed using an electronic medical records/speech recognition system. Although proofread, it may and can contain electronic, spelling and other errors. Corrections may be executed at a later time. Please feel free to contact us for any clarifications as needed.         Lester SUÁREZ Ami Painter MD

## 2020-06-15 ENCOUNTER — TELEPHONE (OUTPATIENT)
Dept: CARDIOLOGY CLINIC | Age: 30
End: 2020-06-15

## 2020-06-26 RX ORDER — ALBUTEROL SULFATE 90 UG/1
AEROSOL, METERED RESPIRATORY (INHALATION)
Qty: 18 INHALER | Refills: 0 | Status: SHIPPED | OUTPATIENT
Start: 2020-06-26 | End: 2020-11-06

## 2020-07-27 ENCOUNTER — HOSPITAL ENCOUNTER (OUTPATIENT)
Dept: LAB | Age: 30
Discharge: HOME | End: 2020-07-27

## 2020-07-27 ENCOUNTER — OFFICE VISIT (OUTPATIENT)
Dept: FAMILY MEDICINE CLINIC | Age: 30
End: 2020-07-27

## 2020-07-27 VITALS
DIASTOLIC BLOOD PRESSURE: 68 MMHG | OXYGEN SATURATION: 98 % | TEMPERATURE: 98.1 F | HEIGHT: 64 IN | WEIGHT: 161.4 LBS | HEART RATE: 86 BPM | BODY MASS INDEX: 27.55 KG/M2 | SYSTOLIC BLOOD PRESSURE: 96 MMHG | RESPIRATION RATE: 20 BRPM

## 2020-07-27 DIAGNOSIS — Z00.00 ENCOUNTER FOR ROUTINE ADULT HEALTH EXAMINATION WITHOUT ABNORMAL FINDINGS: Primary | ICD-10-CM

## 2020-07-27 DIAGNOSIS — Z13.220 SCREENING FOR LIPID DISORDERS: ICD-10-CM

## 2020-07-27 DIAGNOSIS — Z00.00 ENCOUNTER FOR ROUTINE ADULT HEALTH EXAMINATION WITHOUT ABNORMAL FINDINGS: ICD-10-CM

## 2020-07-27 DIAGNOSIS — Z13.1 SCREENING FOR DIABETES MELLITUS: ICD-10-CM

## 2020-07-27 LAB
ALBUMIN SERPL-MCNC: 4 G/DL (ref 3.5–5)
ALBUMIN/GLOB SERPL: 1.1 {RATIO} (ref 1.1–2.2)
ALP SERPL-CCNC: 96 U/L (ref 45–117)
ALT SERPL-CCNC: 25 U/L (ref 12–78)
ANION GAP SERPL CALC-SCNC: 8 MMOL/L (ref 5–15)
AST SERPL-CCNC: 13 U/L (ref 15–37)
BILIRUB SERPL-MCNC: 0.3 MG/DL (ref 0.2–1)
BUN SERPL-MCNC: 16 MG/DL (ref 6–20)
BUN/CREAT SERPL: 23 (ref 12–20)
CALCIUM SERPL-MCNC: 9.1 MG/DL (ref 8.5–10.1)
CHLORIDE SERPL-SCNC: 109 MMOL/L (ref 97–108)
CHOLEST SERPL-MCNC: 168 MG/DL
CO2 SERPL-SCNC: 24 MMOL/L (ref 21–32)
CREAT SERPL-MCNC: 0.71 MG/DL (ref 0.55–1.02)
EST. AVERAGE GLUCOSE BLD GHB EST-MCNC: 97 MG/DL
GLOBULIN SER CALC-MCNC: 3.5 G/DL (ref 2–4)
GLUCOSE SERPL-MCNC: 81 MG/DL (ref 65–100)
HBA1C MFR BLD: 5 % (ref 4–5.6)
HDLC SERPL-MCNC: 72 MG/DL
HDLC SERPL: 2.3 {RATIO} (ref 0–5)
LDLC SERPL CALC-MCNC: 71.2 MG/DL (ref 0–100)
LIPID PROFILE,FLP: NORMAL
POTASSIUM SERPL-SCNC: 4.2 MMOL/L (ref 3.5–5.1)
PROT SERPL-MCNC: 7.5 G/DL (ref 6.4–8.2)
SODIUM SERPL-SCNC: 141 MMOL/L (ref 136–145)
T4 FREE SERPL-MCNC: 1 NG/DL (ref 0.8–1.5)
TRIGL SERPL-MCNC: 124 MG/DL (ref ?–150)
TSH SERPL DL<=0.05 MIU/L-ACNC: 1.37 UIU/ML (ref 0.36–3.74)
VLDLC SERPL CALC-MCNC: 24.8 MG/DL

## 2020-07-27 RX ORDER — CETIRIZINE HCL 10 MG
TABLET ORAL
COMMUNITY
Start: 2020-05-28 | End: 2021-11-05 | Stop reason: ALTCHOICE

## 2020-07-27 RX ORDER — CALC/MAG/B COMPLEX/D3/HERB 61
TABLET ORAL
COMMUNITY
Start: 2020-05-04

## 2020-07-27 NOTE — PATIENT INSTRUCTIONS
A Healthy Lifestyle: Care Instructions Your Care Instructions A healthy lifestyle can help you feel good, stay at a healthy weight, and have plenty of energy for both work and play. A healthy lifestyle is something you can share with your whole family. A healthy lifestyle also can lower your risk for serious health problems, such as high blood pressure, heart disease, and diabetes. You can follow a few steps listed below to improve your health and the health of your family. Follow-up care is a key part of your treatment and safety. Be sure to make and go to all appointments, and call your doctor if you are having problems. It's also a good idea to know your test results and keep a list of the medicines you take. How can you care for yourself at home? · Do not eat too much sugar, fat, or fast foods. You can still have dessert and treats now and then. The goal is moderation. · Start small to improve your eating habits. Pay attention to portion sizes, drink less juice and soda pop, and eat more fruits and vegetables. ? Eat a healthy amount of food. A 3-ounce serving of meat, for example, is about the size of a deck of cards. Fill the rest of your plate with vegetables and whole grains. ? Limit the amount of soda and sports drinks you have every day. Drink more water when you are thirsty. ? Eat at least 5 servings of fruits and vegetables every day. It may seem like a lot, but it is not hard to reach this goal. A serving or helping is 1 piece of fruit, 1 cup of vegetables, or 2 cups of leafy, raw vegetables. Have an apple or some carrot sticks as an afternoon snack instead of a candy bar. Try to have fruits and/or vegetables at every meal. 
· Make exercise part of your daily routine. You may want to start with simple activities, such as walking, bicycling, or slow swimming. Try to be active 30 to 60 minutes every day.  You do not need to do all 30 to 60 minutes all at once. For example, you can exercise 3 times a day for 10 or 20 minutes. Moderate exercise is safe for most people, but it is always a good idea to talk to your doctor before starting an exercise program. 
· Keep moving. Segundo Binghamys the lawn, work in the garden, or [x+1]. Take the stairs instead of the elevator at work. · If you smoke, quit. People who smoke have an increased risk for heart attack, stroke, cancer, and other lung illnesses. Quitting is hard, but there are ways to boost your chance of quitting tobacco for good. ? Use nicotine gum, patches, or lozenges. ? Ask your doctor about stop-smoking programs and medicines. ? Keep trying. In addition to reducing your risk of diseases in the future, you will notice some benefits soon after you stop using tobacco. If you have shortness of breath or asthma symptoms, they will likely get better within a few weeks after you quit. · Limit how much alcohol you drink. Moderate amounts of alcohol (up to 2 drinks a day for men, 1 drink a day for women) are okay. But drinking too much can lead to liver problems, high blood pressure, and other health problems. Family health If you have a family, there are many things you can do together to improve your health. · Eat meals together as a family as often as possible. · Eat healthy foods. This includes fruits, vegetables, lean meats and dairy, and whole grains. · Include your family in your fitness plan. Most people think of activities such as jogging or tennis as the way to fitness, but there are many ways you and your family can be more active. Anything that makes you breathe hard and gets your heart pumping is exercise. Here are some tips: 
? Walk to do errands or to take your child to school or the bus. 
? Go for a family bike ride after dinner instead of watching TV. Where can you learn more? Go to http://krysten-ramo.info/ Enter A524 in the search box to learn more about \"A Healthy Lifestyle: Care Instructions. \" Current as of: January 31, 2020               Content Version: 12.5 © 2006-2020 Healthwise, Incorporated. Care instructions adapted under license by [employer] (which disclaims liability or warranty for this information). If you have questions about a medical condition or this instruction, always ask your healthcare professional. [place] [address] any warranty or liability for your use of this information.

## 2020-07-27 NOTE — PROGRESS NOTES
Subjective:   Carlitos Humphrey is a 35 y.o. female here today for her annual wellness exam. Her gyne and breast care is done elsewhere by her Ob-Gyne physician. Health Maintenance:  · Cervical cancer screening: Dr. Velez Nurse  · Tetanus: Tdap 2/15/2020     Diet: no special diet, currently breastfeeding so diet does vary   Physical Activity: no dedicated exercise, active taking care of 4year old and 11 month old    Current Outpatient Medications   Medication Sig Dispense Refill    lansoprazole (PREVACID) 15 mg capsule TAKE 1 CAPSULE BY MOUTH EVERY DAY      cetirizine (ZYRTEC) 10 mg tablet TAKE 1 TABLET BY MOUTH EVERY DAY      albuterol (PROVENTIL HFA, VENTOLIN HFA, PROAIR HFA) 90 mcg/actuation inhaler TAKE 2 PUFFS BY MOUTH EVERY 4 HOURS AS NEEDED FOR WHEEZE 18 Inhaler 0       Allergies   Allergen Reactions    Shellfish Derived Hives       Past Medical History:   Diagnosis Date    Asthma     Carpal tunnel syndrome     bilateral    [facility]'s disease ([facility] [address]     Environmental and seasonal allergies     GERD (gastroesophageal reflux disease)     Ill-defined condition     crohn's    Migraines     Psychiatric disorder     anxiety    PUD (peptic ulcer disease)     Vertigo        Social History     Tobacco Use    Smoking status: Former Smoker     Types: Cigarettes     Last attempt to quit: 2019     Years since quittin.2    Smokeless tobacco: Never Used   Substance Use Topics    Alcohol use:  Yes     Alcohol/week: 1.0 standard drinks     Types: 1 Glasses of wine per week     Frequency: Monthly or less     Drinks per session: 1 or 2     Binge frequency: Never        Review of Systems  Constitutional: negative for fevers and chills  Eyes: negative for visual disturbance and irritation  Ears, nose, mouth, throat, and face: negative for nasal congestion and sore throat  Respiratory: negative for cough, sputum or dyspnea on exertion  Cardiovascular: negative for chest pain, chest pressure/discomfort, palpitations, irregular heart beats, lower extremity edema  Gastrointestinal: negative for nausea, vomiting, change in bowel habits and abdominal pain  Genitourinary:negative for frequency, dysuria and hematuria  Musculoskeletal: negative for myalgias and arthralgias  Neurological: negative for headaches, dizziness and paresthesia    Objective:   Blood pressure 96/68, pulse 86, temperature 98.1 °F (36.7 °C), temperature source Oral, resp. rate 20, height 5' 4\" (1.626 m), weight 161 lb 6.4 oz (73.2 kg), SpO2 98 %. Physical Examination:   General appearance - alert, well appearing, and in no distress  Mental status - alert, oriented to person, place, and time, normal mood, behavior, speech, dress, motor activity, and thought processes  Eyes - pupils equal and reactive, extraocular eye movements intact, sclera anicteric  Ears - bilateral TM's and external ear canals normal  Mouth - mucous membranes moist, pharynx normal without lesions  Neck - supple, no significant adenopathy, thyroid exam: thyroid is normal in size without nodules or tenderness  Chest - clear to auscultation, no wheezes, rales or rhonchi, symmetric air entry, no tachypnea, retractions or cyanosis  Heart - normal rate, regular rhythm, normal S1, S2, no murmurs, rubs, clicks or gallops  Abdomen - soft, nontender, nondistended, no masses or organomegaly, normal bowel sounds  Neurological - alert, oriented, normal speech, no focal findings or movement disorder noted  Extremities - peripheral pulses normal, no pedal edema, no clubbing or cyanosis  Skin - normal coloration and turgor, no rashes, no suspicious skin lesions noted     Assessment/Plan:   Cha Dozier is a 35 y.o. female seen today for:     1. Encounter for routine adult health examination without abnormal findings: healthy, routine labs ordered. - CBC WITH AUTOMATED DIFF; Future  - METABOLIC PANEL, COMPREHENSIVE; Future  - TSH 3RD GENERATION; Future  - T4, FREE; Future  - LIPID PANEL; Future    2. Screening for diabetes mellitus  - HEMOGLOBIN A1C WITH EAG; Future    3. Screening for lipid disorders  - LIPID PANEL; Future    I have discussed the diagnosis with the patient and the intended plan as seen in the above orders. The patient has received an after-visit summary and questions were answered concerning future plans. I have discussed medication side effects and warnings with the patient as well. Patient verbalizes understanding of plan of care and denies further questions or concerns at this time. Informed patient to return to the office if symptoms worsen or if new symptoms arise. Follow-up and Dispositions    · Return in about 1 year (around 7/27/2021) for annual physical exam or sooner as needed.

## 2020-07-27 NOTE — PROGRESS NOTES
Identified pt with two pt identifiers(name and ). Chief Complaint   Patient presents with    Physical        Health Maintenance Due   Topic    Pneumococcal 0-64 years (1 of 1 - PPSV23)       Wt Readings from Last 3 Encounters:   20 161 lb 6.4 oz (73.2 kg)   20 134 lb (60.8 kg)   19 128 lb (58.1 kg)     Temp Readings from Last 3 Encounters:   20 98.1 °F (36.7 °C) (Oral)   19 97.5 °F (36.4 °C)   19 97.8 °F (36.6 °C) (Oral)     BP Readings from Last 3 Encounters:   20 96/68   20 118/62   19 93/66     Pulse Readings from Last 3 Encounters:   20 86   20 78   19 78         Learning Assessment:  :     Learning Assessment 2018   PRIMARY LEARNER Patient   BARRIERS PRIMARY LEARNER NONE   CO-LEARNER CAREGIVER No   PRIMARY LANGUAGE ENGLISH   LEARNER PREFERENCE PRIMARY DEMONSTRATION     LISTENING   ANSWERED BY patient   RELATIONSHIP SELF       Depression Screening:  :     3 most recent PHQ Screens 2020   Little interest or pleasure in doing things Not at all   Feeling down, depressed, irritable, or hopeless Not at all   Total Score PHQ 2 0       Fall Risk Assessment:  :     No flowsheet data found. Abuse Screening:  :     Abuse Screening Questionnaire 2020 3/7/2019 2018   Do you ever feel afraid of your partner? N N N   Are you in a relationship with someone who physically or mentally threatens you? N N N   Is it safe for you to go home? Alyse Long       Coordination of Care Questionnaire:  :     1) Have you been to an emergency room, urgent care clinic since your last visit? no   Hospitalized since your last visit? no             2) Have you seen or consulted any other health care providers outside of [address] [place] [address] since your last visit? yes  Dr Carley Javier (Include any pap smears or colon screenings in this section.)    3) Do you have an Advance Directive on file?  no  Are you interested in receiving information about Advance Directives? no    Reviewed record in preparation for visit and have obtained necessary documentation. Medication reconciliation up to date and corrected with patient at this time.

## 2020-07-28 LAB
BASOPHILS # BLD: 0.1 K/UL (ref 0–0.1)
BASOPHILS NFR BLD: 1 % (ref 0–1)
DIFFERENTIAL METHOD BLD: ABNORMAL
EOSINOPHIL # BLD: 0.5 K/UL (ref 0–0.4)
EOSINOPHIL NFR BLD: 9 % (ref 0–7)
ERYTHROCYTE [DISTWIDTH] IN BLOOD BY AUTOMATED COUNT: 13.5 % (ref 11.5–14.5)
HCT VFR BLD AUTO: 43.3 % (ref 35–47)
HGB BLD-MCNC: 13.2 G/DL (ref 11.5–16)
IMM GRANULOCYTES # BLD AUTO: 0 K/UL (ref 0–0.04)
IMM GRANULOCYTES NFR BLD AUTO: 0 % (ref 0–0.5)
LYMPHOCYTES # BLD: 1.7 K/UL (ref 0.8–3.5)
LYMPHOCYTES NFR BLD: 29 % (ref 12–49)
MCH RBC QN AUTO: 28.2 PG (ref 26–34)
MCHC RBC AUTO-ENTMCNC: 30.5 G/DL (ref 30–36.5)
MCV RBC AUTO: 92.5 FL (ref 80–99)
MONOCYTES # BLD: 0.2 K/UL (ref 0–1)
MONOCYTES NFR BLD: 4 % (ref 5–13)
NEUTS SEG # BLD: 3.2 K/UL (ref 1.8–8)
NEUTS SEG NFR BLD: 57 % (ref 32–75)
NRBC # BLD: 0 K/UL (ref 0–0.01)
NRBC BLD-RTO: 0 PER 100 WBC
PLATELET # BLD AUTO: 183 K/UL (ref 150–400)
PMV BLD AUTO: 10 FL (ref 8.9–12.9)
RBC # BLD AUTO: 4.68 M/UL (ref 3.8–5.2)
RBC MORPH BLD: ABNORMAL
WBC # BLD AUTO: 5.7 K/UL (ref 3.6–11)

## 2020-08-25 ENCOUNTER — APPOINTMENT (OUTPATIENT)
Dept: CARDIOLOGY CLINIC | Age: 30
End: 2020-08-25

## 2020-09-03 ENCOUNTER — VIRTUAL VISIT (OUTPATIENT)
Dept: FAMILY MEDICINE CLINIC | Age: 30
End: 2020-09-03
Payer: COMMERCIAL

## 2020-09-03 DIAGNOSIS — J30.9 ALLERGIC RHINITIS, UNSPECIFIED SEASONALITY, UNSPECIFIED TRIGGER: Primary | ICD-10-CM

## 2020-09-03 PROCEDURE — 99213 OFFICE O/P EST LOW 20 MIN: CPT | Performed by: FAMILY MEDICINE

## 2020-09-03 RX ORDER — FLUTICASONE PROPIONATE 50 MCG
2 SPRAY, SUSPENSION (ML) NASAL DAILY
Qty: 1 BOTTLE | Refills: 5 | Status: SHIPPED | OUTPATIENT
Start: 2020-09-03 | End: 2021-07-12 | Stop reason: SDUPTHER

## 2020-09-03 NOTE — PROGRESS NOTES
Elisha De Paz is a 35 y.o. female who was seen by synchronous (real-time) audio-video technology on 9/3/2020 for Allergic Rhinitis (would like prescritpion for Flonase)      Assessment & Plan:   Diagnoses and all orders for this visit:    1. Allergic rhinitis, unspecified seasonality, unspecified trigger: continue with Zyrtec, Flonase Rx sent. -     fluticasone propionate (FLONASE) 50 mcg/actuation nasal spray; 2 Sprays by Both Nostrils route daily. Subjective:   Reports nasal congestion and irritation. On Flonase per ENT and reports that she was doing well on this therapy. Would like refill. Prior to Admission medications    Medication Sig Start Date End Date Taking? Authorizing Provider   lansoprazole (PREVACID) 15 mg capsule TAKE 1 CAPSULE BY MOUTH EVERY DAY 20  Yes Provider, Historical   cetirizine (ZYRTEC) 10 mg tablet TAKE 1 TABLET BY MOUTH EVERY DAY 20  Yes Provider, Historical   albuterol (PROVENTIL HFA, VENTOLIN HFA, PROAIR HFA) 90 mcg/actuation inhaler TAKE 2 PUFFS BY MOUTH EVERY 4 HOURS AS NEEDED FOR WHEEZE 20  Yes Steven Michaels MD       Allergies   Allergen Reactions    Shellfish Derived Hives       Past Medical History:   Diagnosis Date    Asthma     Carpal tunnel syndrome     bilateral    [facility] disease ([facility] [address]     Environmental and seasonal allergies     GERD (gastroesophageal reflux disease)     Ill-defined condition     crohn's    Migraines     Psychiatric disorder     anxiety    PUD (peptic ulcer disease)     Vertigo      Social History     Tobacco Use    Smoking status: Former Smoker     Types: Cigarettes     Last attempt to quit: 2019     Years since quittin.3    Smokeless tobacco: Never Used   Substance Use Topics    Alcohol use:  Yes     Alcohol/week: 1.0 standard drinks     Types: 1 Glasses of wine per week     Frequency: Monthly or less     Drinks per session: 1 or 2     Binge frequency: Never       ROS  Pertinent items noted in HPI    Objective:   No flowsheet data found. General: alert, cooperative, no distress   Mental  status: normal mood, behavior, speech, dress, motor activity, and thought processes, able to follow commands   HENT: NCAT   Neck: no visualized mass   Resp: no respiratory distress   Neuro: no gross deficits   Skin: no discoloration or lesions of concern on visible areas   Psychiatric: normal affect, consistent with stated mood, no evidence of hallucinations       We discussed the expected course, resolution and complications of the diagnosis(es) in detail. Medication risks, benefits, costs, interactions, and alternatives were discussed as indicated. I advised her to contact the office if her condition worsens, changes or fails to improve as anticipated. She expressed understanding with the diagnosis(es) and plan. Darrel Vázquze, who was evaluated through a patient-initiated, synchronous (real-time) audio-video encounter, and/or her healthcare decision maker, is aware that it is a billable service, with coverage as determined by her insurance carrier. She provided verbal consent to proceed: Yes, and patient identification was verified. It was conducted pursuant to the emergency declaration under the [facility], [address] [place] [address] authority and the [employer] General Act. A caregiver was present when appropriate. Ability to conduct physical exam was limited. I was in the office. The patient was at home.       Hany Isbell MD

## 2020-10-17 ENCOUNTER — HOSPITAL ENCOUNTER (EMERGENCY)
Age: 30
Discharge: HOME | End: 2020-10-17
Attending: EMERGENCY MEDICINE
Payer: COMMERCIAL

## 2020-10-17 VITALS
WEIGHT: 162.04 LBS | HEART RATE: 88 BPM | BODY MASS INDEX: 27.66 KG/M2 | RESPIRATION RATE: 16 BRPM | TEMPERATURE: 97.2 F | OXYGEN SATURATION: 100 % | DIASTOLIC BLOOD PRESSURE: 85 MMHG | SYSTOLIC BLOOD PRESSURE: 131 MMHG | HEIGHT: 64 IN

## 2020-10-17 DIAGNOSIS — S61.011A LACERATION OF RIGHT THUMB WITHOUT FOREIGN BODY WITHOUT DAMAGE TO NAIL, INITIAL ENCOUNTER: Primary | ICD-10-CM

## 2020-10-17 PROCEDURE — 99282 EMERGENCY DEPT VISIT SF MDM: CPT

## 2020-10-17 PROCEDURE — 75810000293 HC SIMP/SUPERF WND  RPR

## 2020-10-17 NOTE — ED PROVIDER NOTES
23-year-old presents with a right thumb laceration. Patient was using a mandolin to cut food when she sliced her thumb. Her tetanus is up-to-date. Past Medical History:   Diagnosis Date    Asthma     Carpal tunnel syndrome     bilateral    Crohn's disease ([address]     Environmental and seasonal allergies     GERD (gastroesophageal reflux disease)     Ill-defined condition     crohn's    Migraines     Psychiatric disorder     anxiety    PUD (peptic ulcer disease)     Vertigo        Past Surgical History:   Procedure Laterality Date    COLONOSCOPY N/A 2019    COLONOSCOPY performed by Harriet Simpson MD at [facility] END[facility]COPY    HX  SECTION      HX OVARIAN CYST REMOVAL      HX TONSILLECTOMY           Family History:   Problem Relation Age of Onset    Diabetes Mother     Hypertension Mother     Elevated Lipids Mother     Hypertension Father    [employer] Elevated Lipids Father        Social History     Socioeconomic History    Marital status: SINGLE     Spouse name: Not on file    Number of children: Not on file    Years of education: Not on file    Highest education level: Not on file   Occupational History    Not on file   Social Needs    Financial resource strain: Not on file    Food insecurity     Worry: Not on file     Inability: Not on file    Transportation needs     Medical: Not on file     Non-medical: Not on file   Tobacco Use    Smoking status: Former Smoker     Types: Cigarettes     Last attempt to quit: 2019     Years since quittin.5    Smokeless tobacco: Never Used   Substance and Sexual Activity    Alcohol use:  Yes     Alcohol/week: 1.0 standard drinks     Types: 1 Glasses of wine per week     Frequency: Monthly or less     Drinks per session: 1 or 2     Binge frequency: Never    Drug use: No    Sexual activity: Yes     Partners: Male   Lifestyle    Physical activity     Days per week: Not on file     Minutes per session: Not on file    Stress: Not on file   Relationships    Social connections     Talks on phone: Not on file     Gets together: Not on file     Attends [employer] service: Not on file     Active member of club or organization: Not on file     Attends meetings of clubs or organizations: Not on file     Relationship status: Not on file    Intimate partner violence     Fear of current or ex partner: Not on file     Emotionally abused: Not on file     Physically abused: Not on file     Forced sexual activity: Not on file   Other Topics Concern    Not on file   Social History Narrative    Not on file         ALLERGIES: Shellfish derived    Review of Systems   Constitutional: Negative for fever. Skin: Positive for wound. Vitals:    10/17/20 1737   BP: 131/85   Pulse: 88   Resp: 16   Temp: 97.2 °F (36.2 °C)   SpO2: 100%   Weight: 73.5 kg (162 lb 0.6 oz)   Height: 5' 4\" (1.626 m)            Physical Exam  Vitals signs and nursing note reviewed. Constitutional:       General: She is not in acute distress. Appearance: Normal appearance. She is not ill-appearing or toxic-appearing. HENT:      Head: Normocephalic and atraumatic. Eyes:      Extraocular Movements: Extraocular movements intact. Neck:      Musculoskeletal: Normal range of motion. Cardiovascular:      Rate and Rhythm: Normal rate and regular rhythm. Pulses: Normal pulses. Heart sounds: Normal heart sounds. Pulmonary:      Effort: Pulmonary effort is normal. No respiratory distress. Breath sounds: Normal breath sounds. No wheezing. Abdominal:      General: There is no distension. Musculoskeletal: Normal range of motion. Skin:     General: Skin is warm and dry. Comments: Laceration to the distal right thumb in a semicircular pattern. It is at the very tip of the thumb and does not involve the nailbed. Bleeding is controlled. Neurological:      Mental Status: She is alert and oriented to person, place, and time.    Psychiatric:         Mood and Affect: Mood normal.          MDM  Number of Diagnoses or Management Options  Laceration of right thumb without foreign body without damage to nail, initial encounter:   Diagnosis management comments: Patient washed the wound out with soap and water. The wound is amenable to adhesive repair. Patient was given wound care instructions. She is comfortable and agreeable with plan of care and aware of her return precautions. Wound Closure by Adhesive    Date/Time: 10/18/2020 7:55 AM  Performed by: Micheal Lopez MD  Authorized by: Micheal Lopez MD     Consent:     Consent obtained:  Verbal    Consent given by:  Patient    Risks discussed:  Infection and pain    Alternatives discussed:  Observation  Anesthesia (see MAR for exact dosages): Anesthesia method:  None  Laceration details:     Location:  Finger    Finger location:  R thumb    Length (cm):  1  Repair type:     Repair type:  Simple  Exploration:     Hemostasis achieved with:  Direct pressure    Contaminated: no    Treatment:     Area cleansed with:  Soap and water    Amount of cleaning:  Standard  Skin repair:     Repair method:  Tissue adhesive  Approximation:     Approximation:  Close  Post-procedure details:     Dressing:  Open (no dressing)    Patient tolerance of procedure:   Tolerated well, no immediate complications

## 2020-10-17 NOTE — ED TRIAGE NOTES
Pt ambulates to treatment area she states that 2-3 hrs ago she was slicing cheese with a mandolin and sliced the end of her right thumb. She held pressure and cleaned it then applied bacitracin but it still wants to bleed.   She states the piece is still attached

## 2020-11-05 ENCOUNTER — VIRTUAL VISIT (OUTPATIENT)
Dept: FAMILY MEDICINE CLINIC | Age: 30
End: 2020-11-05
Payer: COMMERCIAL

## 2020-11-05 DIAGNOSIS — L91.8 CUTANEOUS SKIN TAGS: ICD-10-CM

## 2020-11-05 DIAGNOSIS — L98.9 SKIN LESIONS: Primary | ICD-10-CM

## 2020-11-05 DIAGNOSIS — L21.0 FLAKY SCALP: ICD-10-CM

## 2020-11-05 PROCEDURE — 99212 OFFICE O/P EST SF 10 MIN: CPT | Performed by: FAMILY MEDICINE

## 2020-11-05 NOTE — PROGRESS NOTES
Cooper Bnaks is a 35 y.o. female    Chief Complaint   Patient presents with    Other     wants to see dermatoligist        Health Maintenance Due   Topic Date Due    Pneumococcal 0-64 years (1 of 1 - PPSV23) 01/08/1997    Flu Vaccine (1) 09/01/2020    PAP AKA CERVICAL CYTOLOGY  01/18/2021       3 most recent PHQ Screens 6/12/2020   Little interest or pleasure in doing things Not at all   Feeling down, depressed, irritable, or hopeless Not at all   Total Score PHQ 2 0     Abuse Screening Questionnaire 5/4/2020   Do you ever feel afraid of your partner? N   Are you in a relationship with someone who physically or mentally threatens you? N   Is it safe for you to go home? Y         1. Have you been to the ER, urgent care clinic since your last visit? Hospitalized since your last visit? YES, ER for cut finger 10/17/20 [facility] ER    2. Have you seen or consulted any other health care providers outside of the [address] [place] [address] since your last visit? Include any pap smears or colon screening.  No

## 2020-11-05 NOTE — LETTER
11/5/2020 11:05 AM 
 
Ms. Funmi Hatch [address] [id number] Dermatology [facility] 
[phone] 
[facility], [address] [facility], [address] [place] [address] [facility] 
[phone] [address] [facility], [address] [place] [address], [facility]/SHORT PUMP OFFICE 
[facility], [facility], [facility] [address] [facility] Dermatology Dr. Cristy Ramírez  
[phone] [address] [place] [address], [address]erm[address] Dr. Evonne Carrel Phone: [phone] [address] [place] [address] **You may also contact your insurance provider for a list of in-network providers. Sincerely, Jacinta Vela MD

## 2020-11-05 NOTE — PROGRESS NOTES
Moe Louis is a 35 y.o. female who was seen by synchronous (real-time) audio-video technology on 11/5/2020 for Other (wants to see dermatoligist )    Assessment & Plan:   Diagnoses and all orders for this visit:    1. Skin lesions    2. Flaky scalp    3. Cutaneous skin tags    Patient would like to see Dermatology for the above. Provided with list of area offices via her email address on file. Also recommend that she may contact her insurance provider for [employer]. Subjective:   Having small blackheads on the back and states that she was able to remove. Have bumps on the right upper back and skin tags along the bra line which will become irritated. Scalp flakiness. Would like referral to Dermatology. Uncomfortable with returning to work at this time due to current COVID-19 pandemic along with her history of asthma. Concerned about catching illness and also with michael and bringing home to her family. Prior to Admission medications    Medication Sig Start Date End Date Taking? Authorizing Provider   PNV Comb #2-Iron-FA-Omega 3 29-1-400 mg cmpk Take  by mouth. Yes Provider, Historical   LECITHIN-19 PO Take  by mouth. Yes Provider, Historical   fluticasone propionate (FLONASE) 50 mcg/actuation nasal spray 2 Sprays by Both Nostrils route daily.  9/3/20  Yes Nita Solis MD   lansoprazole (PREVACID) 15 mg capsule TAKE 1 CAPSULE BY MOUTH EVERY DAY 5/4/20  Yes Provider, Historical   cetirizine (ZYRTEC) 10 mg tablet TAKE 1 TABLET BY MOUTH EVERY DAY 5/28/20  Yes Provider, Historical   albuterol (PROVENTIL HFA, VENTOLIN HFA, PROAIR HFA) 90 mcg/actuation inhaler TAKE 2 PUFFS BY MOUTH EVERY 4 HOURS AS NEEDED FOR WHEEZE 6/26/20  Yes Nita Solis MD       Allergies   Allergen Reactions    Shellfish Derived Hives     Not allergic       Past Medical History:   Diagnosis Date    Asthma     Carpal tunnel syndrome     bilateral    Cro[facility]'s disease ([facility] [address]     Environmental and seasonal allergies     GERD (gastroesophageal reflux disease)     Ill-defined condition     crohn's    Migraines     Psychiatric disorder     anxiety    PUD (peptic ulcer disease)     Vertigo        Social History     Tobacco Use    Smoking status: Former Smoker     Types: Cigarettes     Last attempt to quit: 2019     Years since quittin.5    Smokeless tobacco: Never Used   Substance Use Topics    Alcohol use: Yes     Alcohol/week: 1.0 standard drinks     Types: 1 Glasses of wine per week     Frequency: Monthly or less     Drinks per session: 1 or 2     Binge frequency: Never       ROS   Pertinent items noted in HPI    Objective:   No flowsheet data found. General: alert, cooperative, no distress   Mental  status: normal mood, behavior, speech, dress, motor activity, and thought processes, able to follow commands   HENT: NCAT   Neck: no visualized mass   Resp: no respiratory distress   Neuro: no gross deficits   Skin: no discoloration or lesions of concern on visible areas   Psychiatric: normal affect, consistent with stated mood, no evidence of hallucinations       We discussed the expected course, resolution and complications of the diagnosis(es) in detail. Medication risks, benefits, costs, interactions, and alternatives were discussed as indicated. I advised her to contact the office if her condition worsens, changes or fails to improve as anticipated. She expressed understanding with the diagnosis(es) and plan. Satish Fitzgerald, who was evaluated through a patient-initiated, synchronous (real-time) audio-video encounter, and/or her healthcare decision maker, is aware that it is a billable service, with coverage as determined by her insurance carrier. She provided verbal consent to proceed: Yes, and patient identification was verified.  It was conducted pursuant to the emergency declaration under the [address], [address] waiver authority and the Coronavirus Preparedness and Response Supplemental Appropriations Act. A caregiver was present when appropriate. Ability to conduct physical exam was limited. I was in the office. The patient was at home.       Saintclair Laos, MD

## 2020-11-06 RX ORDER — ALBUTEROL SULFATE 90 UG/1
AEROSOL, METERED RESPIRATORY (INHALATION)
Qty: 18 INHALER | Refills: 2 | Status: SHIPPED | OUTPATIENT
Start: 2020-11-06 | End: 2021-06-16 | Stop reason: SDUPTHER

## 2020-12-15 ENCOUNTER — VIRTUAL VISIT (OUTPATIENT)
Dept: FAMILY MEDICINE CLINIC | Age: 30
End: 2020-12-15
Payer: COMMERCIAL

## 2020-12-15 DIAGNOSIS — Z78.9 UNKNOWN STATUS OF IMMUNITY TO COVID-19 VIRUS: ICD-10-CM

## 2020-12-15 DIAGNOSIS — F41.9 ANXIETY: Primary | ICD-10-CM

## 2020-12-15 PROCEDURE — 99213 OFFICE O/P EST LOW 20 MIN: CPT | Performed by: FAMILY MEDICINE

## 2020-12-15 RX ORDER — SERTRALINE HYDROCHLORIDE 25 MG/1
25 TABLET, FILM COATED ORAL DAILY
Qty: 30 TAB | Refills: 2 | Status: SHIPPED | OUTPATIENT
Start: 2020-12-15 | End: 2021-03-25

## 2020-12-15 NOTE — PROGRESS NOTES
Estelle Craig is a 35 y.o. female    Chief Complaint   Patient presents with    Anxiety     patient states it has been getting worse x2 mo       Health Maintenance Due   Topic Date Due    Pneumococcal 0-64 years (1 of 1 - PPSV23) 01/08/1997    Flu Vaccine (1) 09/01/2020    PAP AKA CERVICAL CYTOLOGY  01/18/2021       3 most recent PHQ Screens 6/12/2020   Little interest or pleasure in doing things Not at all   Feeling down, depressed, irritable, or hopeless Not at all   Total Score PHQ 2 0       Abuse Screening Questionnaire 5/4/2020   Do you ever feel afraid of your partner? N   Are you in a relationship with someone who physically or mentally threatens you? N   Is it safe for you to go home? Y         1. Have you been to the ER, urgent care clinic since your last visit? Hospitalized since your last visit? No    2. Have you seen or consulted any other health care providers outside of the [address] [place] [address] since your last visit? Include any pap smears or colon screening.  No

## 2020-12-15 NOTE — PROGRESS NOTES
Funmi Núñez is a 35 y.o. female who was seen by synchronous (real-time) audio-video technology on 12/15/2020 for Anxiety (patient states it has been getting worse x2 mo)      Assessment & Plan:   Diagnoses and all orders for this visit:    1. Anxiety: will start SSRI therapy with sertraline. Titrate dose based upon effectiveness and tolerability. Medication side effects discussed. Encouraged to start outpatient therapy. -     sertraline (ZOLOFT) 25 mg tablet; Take 1 Tab by mouth daily. 2. Unknown status of immunity to COVID-19 virus  -     SARS-COV-2 AB, IGG; Future    712  Subjective:   \"I've realized that I'm a lot more anxious now\". Anxious about lots of things - weather, move to new home, children getting hurt. Worsening over the last 2 months. Occurring daily. Has made outpatient therapy appointment which she admits to missing due to forgetting. Has not been sleeping well. Would like to have COVID antibody testing. Prior to Admission medications    Medication Sig Start Date End Date Taking? Authorizing Provider   albuterol (PROVENTIL HFA, VENTOLIN HFA, PROAIR HFA) 90 mcg/actuation inhaler INHALE 2 PUFFS BY MOUTH EVERY 4 HOURS AS NEEDED FOR WHEEZE 11/6/20  Yes Rakesh Juares MD   PNV Comb #2-Iron-FA-Omega 3 29-1-400 mg cmpk Take  by mouth. Yes Provider, Historical   LECITHIN-19 PO Take  by mouth. Yes Provider, Historical   fluticasone propionate (FLONASE) 50 mcg/actuation nasal spray 2 Sprays by Both Nostrils route daily.  9/3/20  Yes Rakesh Juares MD   lansoprazole (PREVACID) 15 mg capsule TAKE 1 CAPSULE BY MOUTH EVERY DAY 5/4/20  Yes Provider, Historical   cetirizine (ZYRTEC) 10 mg tablet TAKE 1 TABLET BY MOUTH EVERY DAY 5/28/20  Yes Provider, Historical       Allergies   Allergen Reactions    Shellfish Derived Hives     Not allergic       Past Medical History:   Diagnosis Date    Asthma     Carpal tunnel syndrome     bilateral    [facility]'s disease ([facility] [address]     Environmental and seasonal allergies     GERD (gastroesophageal reflux disease)     Ill-defined condition     crohn's    Migraines     Psychiatric disorder     anxiety    PUD (peptic ulcer disease)     Vertigo        Social History     Tobacco Use    Smoking status: Former Smoker     Types: Cigarettes     Last attempt to quit: 2019     Years since quittin.6    Smokeless tobacco: Never Used   Substance Use Topics    Alcohol use: Yes     Alcohol/week: 1.0 standard drinks     Types: 1 Glasses of wine per week     Frequency: Monthly or less     Drinks per session: 1 or 2     Binge frequency: Never       ROS   Pertinent items noted in HPI    Objective:     Patient-Reported Vitals 12/15/2020   Patient-Reported Pulse 82      General: alert, cooperative, no distress   Mental  status: normal mood, behavior, speech, dress, motor activity, and thought processes, able to follow commands   HENT: NCAT   Neck: no visualized mass   Resp: no respiratory distress   Neuro: no gross deficits   Skin: no discoloration or lesions of concern on visible areas   Psychiatric: normal affect, consistent with stated mood, no evidence of hallucinations       We discussed the expected course, resolution and complications of the diagnosis(es) in detail. Medication risks, benefits, costs, interactions, and alternatives were discussed as indicated. I advised her to contact the office if her condition worsens, changes or fails to improve as anticipated. She expressed understanding with the diagnosis(es) and plan. Shadi Haynes, who was evaluated through a patient-initiated, synchronous (real-time) audio-video encounter, and/or her healthcare decision maker, is aware that it is a billable service, with coverage as determined by her insurance carrier. She provided verbal consent to proceed: Yes, and patient identification was verified.  It was conducted pursuant to the emergency declaration under the [address] [facility] [place], [address] waiver authority and the [employer] and [employer] General Act. A caregiver was present when appropriate. Ability to conduct physical exam was limited. I was in the office. The patient was at home.       Sunita Wilcox MD

## 2021-01-18 ENCOUNTER — VIRTUAL VISIT (OUTPATIENT)
Dept: FAMILY MEDICINE CLINIC | Age: 31
End: 2021-01-18
Payer: COMMERCIAL

## 2021-01-18 DIAGNOSIS — F41.9 ANXIETY: Primary | ICD-10-CM

## 2021-01-18 PROCEDURE — 99212 OFFICE O/P EST SF 10 MIN: CPT | Performed by: FAMILY MEDICINE

## 2021-01-18 NOTE — PROGRESS NOTES
Arlington Curling is a 28 y.o. female who was seen by synchronous (real-time) audio-video technology on 1/18/2021 for Anxiety (1 month follow up after starting sertraline)      Assessment & Plan:   Diagnoses and all orders for this visit:    1. Anxiety: improved, continue with current therapy. Recommend follow up in 8-12 weeks. Recommendation made for COVID-19 vaccine once available. 712  Subjective:   Medication has been effective and feels less anxious. Does feel some fatigue, but attributes this to caring for a young child. She has first therapy session on 1/21/2021. Prior to Admission medications    Medication Sig Start Date End Date Taking? Authorizing Provider   sertraline (ZOLOFT) 25 mg tablet Take 1 Tab by mouth daily. 12/15/20  Yes Lexi Gonzalez MD   albuterol (PROVENTIL HFA, VENTOLIN HFA, PROAIR HFA) 90 mcg/actuation inhaler INHALE 2 PUFFS BY MOUTH EVERY 4 HOURS AS NEEDED FOR WHEEZE 11/6/20  Yes Lexi Gonzalez MD   fluticasone propionate (FLONASE) 50 mcg/actuation nasal spray 2 Sprays by Both Nostrils route daily. 9/3/20  Yes Lexi Gonzalez MD   lansoprazole (PREVACID) 15 mg capsule TAKE 1 CAPSULE BY MOUTH EVERY DAY 5/4/20  Yes Provider, Historical   cetirizine (ZYRTEC) 10 mg tablet TAKE 1 TABLET BY MOUTH EVERY DAY 5/28/20  Yes Provider, Historical   PNV Comb #2-Iron-FA-Omega 3 29-1-400 mg cmpk Take  by mouth. 1/18/21  Provider, Historical   LECITHIN-19 PO Take  by mouth.   1/18/21  Provider, Historical       Allergies   Allergen Reactions    Shellfish Derived Hives     Not allergic       Past Medical History:   Diagnosis Date    Asthma     Carpal tunnel syndrome     bilateral    [facility] disease ([facility] [address]     Environmental and seasonal allergies     GERD (gastroesophageal reflux disease)     Ill-defined condition     crohn's    Migraines     Psychiatric disorder     anxiety    PUD (peptic ulcer disease)     Vertigo        Social History     Tobacco Use    Smoking status: Former Smoker     Types: Cigarettes     Quit date: 2019     Years since quittin.7    Smokeless tobacco: Never Used   Substance Use Topics    Alcohol use: Yes     Alcohol/week: 1.0 standard drinks     Types: 1 Glasses of wine per week     Frequency: Monthly or less     Drinks per session: 1 or 2     Binge frequency: Never       ROS   Pertinent items noted in HPI    Objective:     Patient-Reported Vitals 12/15/2020   Patient-Reported Pulse 82      General: alert, cooperative, no distress   Mental  status: normal mood, behavior, speech, dress, motor activity, and thought processes, able to follow commands   HENT: NCAT   Neck: no visualized mass   Resp: no respiratory distress   Neuro: no gross deficits   Skin: no discoloration or lesions of concern on visible areas   Psychiatric: normal affect, consistent with stated mood, no evidence of hallucinations       We discussed the expected course, resolution and complications of the diagnosis(es) in detail. Medication risks, benefits, costs, interactions, and alternatives were discussed as indicated. I advised her to contact the office if her condition worsens, changes or fails to improve as anticipated. She expressed understanding with the diagnosis(es) and plan. Kendell Miller, who was evaluated through a patient-initiated, synchronous (real-time) audio-video encounter, and/or her healthcare decision maker, is aware that it is a billable service, with coverage as determined by her insurance carrier. She provided verbal consent to proceed: Yes, and patient identification was verified. It was conducted pursuant to the emergency declaration under the [address] [place] [address] and the [employer] and [employer] Act. A caregiver was present when appropriate. Ability to conduct physical exam was limited. I was in the office. The patient was at home.       Milka Bello MD

## 2021-01-22 RX ORDER — CETIRIZINE HCL 10 MG
TABLET ORAL
Status: CANCELLED | OUTPATIENT
Start: 2021-01-22

## 2021-03-24 DIAGNOSIS — F41.9 ANXIETY: ICD-10-CM

## 2021-03-25 RX ORDER — SERTRALINE HYDROCHLORIDE 25 MG/1
TABLET, FILM COATED ORAL
Qty: 90 TAB | Refills: 1 | Status: SHIPPED | OUTPATIENT
Start: 2021-03-25 | End: 2021-04-15 | Stop reason: DRUGHIGH

## 2021-03-29 ENCOUNTER — VIRTUAL VISIT (OUTPATIENT)
Dept: FAMILY MEDICINE CLINIC | Age: 31
End: 2021-03-29
Payer: COMMERCIAL

## 2021-03-29 DIAGNOSIS — U07.1 COVID-19: Primary | ICD-10-CM

## 2021-03-29 PROCEDURE — 99212 OFFICE O/P EST SF 10 MIN: CPT | Performed by: FAMILY MEDICINE

## 2021-03-29 NOTE — PROGRESS NOTES
Álvaro Perdomo is a 28 y.o. female who was seen by synchronous (real-time) audio-video technology on 3/29/2021 for Concern For COVID-19 (Coronavirus) (tested positive Saturday)      Assessment & Plan:   Diagnoses and all orders for this visit:    1. COVID-19: well appearing during today's visit with no signs of respiratory distress. Symptomatic therapies discussed. Current CDC quarantine guidelines discussed (may stop quarantine 10 days after symptom onset and 24 hours with no fever without the use of fever reducing medications and symptoms are improving). Signs/symptoms that would warrant reevaluation discussed (worsening symptoms, worsening dyspnea, etc). 712  Subjective:   Pt evaluated at [facility] and reports positive COVID test on 3/27/21. Reports that she originally went to ED due to her son having fever and lethargy. Reports that ED provider recommended that she be tested for COVID. Did have body aches for 3-4 days which have resolve. Currently with headache, painful neck glands, abdominal pain, dry cough, nausea. Denies fever, vomiting, diarrhea, loss of taste or smell. Does having mild SOB with exertion. Prior to Admission medications    Medication Sig Start Date End Date Taking? Authorizing Provider   sertraline (ZOLOFT) 25 mg tablet TAKE 1 TABLET BY MOUTH EVERY DAY 3/25/21   Milly Olivier MD   albuterol (PROVENTIL HFA, VENTOLIN HFA, PROAIR HFA) 90 mcg/actuation inhaler INHALE 2 PUFFS BY MOUTH EVERY 4 HOURS AS NEEDED FOR WHEEZE 11/6/20   Milly Olivier MD   fluticasone propionate (FLONASE) 50 mcg/actuation nasal spray 2 Sprays by Both Nostrils route daily.  9/3/20   Milly Olivier MD   lansoprazole (PREVACID) 15 mg capsule TAKE 1 CAPSULE BY MOUTH EVERY DAY 5/4/20   Provider, Historical   cetirizine (ZYRTEC) 10 mg tablet TAKE 1 TABLET BY MOUTH EVERY DAY 5/28/20   Provider, Historical       Current Outpatient Medications   Medication Sig Dispense Refill    sertraline (ZOLOFT) 25 mg tablet TAKE 1 TABLET BY MOUTH EVERY DAY 90 Tab 1    albuterol (PROVENTIL HFA, VENTOLIN HFA, PROAIR HFA) 90 mcg/actuation inhaler INHALE 2 PUFFS BY MOUTH EVERY 4 HOURS AS NEEDED FOR WHEEZE 18 Inhaler 2    fluticasone propionate (FLONASE) 50 mcg/actuation nasal spray 2 Sprays by Both Nostrils route daily. 1 Bottle 5    lansoprazole (PREVACID) 15 mg capsule TAKE 1 CAPSULE BY MOUTH EVERY DAY      cetirizine (ZYRTEC) 10 mg tablet TAKE 1 TABLET BY MOUTH EVERY DAY       Allergies   Allergen Reactions    Shellfish Derived Hives     Not allergic     Social History     Tobacco Use    Smoking status: Former Smoker     Types: Cigarettes     Quit date: 2019     Years since quittin.9    Smokeless tobacco: Never Used   Substance Use Topics    Alcohol use: Yes     Alcohol/week: 1.0 standard drinks     Types: 1 Glasses of wine per week     Frequency: Monthly or less     Drinks per session: 1 or 2     Binge frequency: Never       ROS   Pertinent items noted in HPI    Objective:     Patient-Reported Vitals 3/29/2021   Patient-Reported Weight 156lb   Patient-Reported Pulse -      General: alert, cooperative, no distress, playing with her son   Mental  status: normal mood, behavior, speech, dress, motor activity, and thought processes, able to follow commands   HENT: NCAT   Neck: no visualized mass   Resp: no respiratory distress, able to speak in complete sentences   Neuro: no gross deficits   Skin: no discoloration or lesions of concern on visible areas   Psychiatric: normal affect, consistent with stated mood, no evidence of hallucinations       We discussed the expected course, resolution and complications of the diagnosis(es) in detail. Medication risks, benefits, costs, interactions, and alternatives were discussed as indicated. I advised her to contact the office if her condition worsens, changes or fails to improve as anticipated. She expressed understanding with the diagnosis(es) and plan. Shanda Roe was evaluated through a synchronous (real-time) audio-video encounter. The patient (or guardian if applicable) is aware that this is a billable service. Verbal consent to proceed has been obtained within the past 12 months. The visit was conducted pursuant to the emergency declaration under the [address] [place] [address] authority and the [employer] General Act. Patient identification was verified, and a caregiver was present when appropriate. The patient was located in a state where the provider was credentialed to provide care. I was in the office. The patient was at home.        Natalia Kelly MD

## 2021-03-29 NOTE — PROGRESS NOTES
Adiel Alston is a 28 y.o. female    Chief Complaint   Patient presents with    Concern For COVID-19 (Coronavirus)     tested positive Saturday   experiencing cough, abdominal pain, constant headache      [phone]    Health Maintenance Due   Topic Date Due    Hepatitis C Screening  Never done    Pneumococcal 0-64 years (1 of 1 - PPSV23) Never done    Flu Vaccine (1) Never done    PAP AKA CERVICAL CYTOLOGY  01/18/2021       There were no vitals taken for this visit. 3 most recent PHQ Screens 6/12/2020   Little interest or pleasure in doing things Not at all   Feeling down, depressed, irritable, or hopeless Not at all   Total Score PHQ 2 0       No flowsheet data found. Abuse Screening Questionnaire 5/4/2020   Do you ever feel afraid of your partner? N   Are you in a relationship with someone who physically or mentally threatens you? N   Is it safe for you to go home? Y         1. Have you been to the ER, urgent care clinic since your last visit? Hospitalized since your last visit? yes [facility] for covid positive test     2. Have you seen or consulted any other health care providers outside of the [address] [place] [address] since your last visit? Include any pap smears or colon screening. yes OBGYN and gastroenterologist

## 2021-04-06 ENCOUNTER — TELEPHONE (OUTPATIENT)
Dept: FAMILY MEDICINE CLINIC | Age: 31
End: 2021-04-06

## 2021-04-06 NOTE — TELEPHONE ENCOUNTER
Pt's 2nd request. 1st request was through [id number] [employer] message on 03/29/21.

## 2021-04-06 NOTE — TELEPHONE ENCOUNTER
----- Message from Gloria Session sent at 4/6/2021 12:48 PM EDT -----  Regarding: Dr. Triica Siegel (if not patient): N/A      Relationship of caller (if not patient): N/A      Best contact number(s): [phone]      Name of medication and dosage if known: \"Ibuprofen\" 600mg      Is patient out of this medication (yes/no): yes      Pharmacy name: [facility] [id number] listed in chart? (yes/no): yes  Pharmacy phone number: unknown      Details to clarify the request:  Patient sent a message on [email or web address] regarding this medication as well.       Gloria Session

## 2021-04-07 RX ORDER — IBUPROFEN 600 MG/1
600 TABLET ORAL
Qty: 90 TAB | Refills: 1 | Status: SHIPPED | OUTPATIENT
Start: 2021-04-07 | End: 2021-06-16 | Stop reason: SDUPTHER

## 2021-04-15 ENCOUNTER — VIRTUAL VISIT (OUTPATIENT)
Dept: FAMILY MEDICINE CLINIC | Age: 31
End: 2021-04-15
Payer: COMMERCIAL

## 2021-04-15 DIAGNOSIS — F41.9 ANXIETY: Primary | ICD-10-CM

## 2021-04-15 PROCEDURE — 99441 PR PHYS/QHP TELEPHONE EVALUATION 5-10 MIN: CPT | Performed by: FAMILY MEDICINE

## 2021-04-15 RX ORDER — SERTRALINE HYDROCHLORIDE 50 MG/1
50 TABLET, FILM COATED ORAL DAILY
Qty: 90 TAB | Refills: 1 | Status: SHIPPED | OUTPATIENT
Start: 2021-04-15 | End: 2021-09-07 | Stop reason: SDUPTHER

## 2021-04-15 NOTE — PROGRESS NOTES
Luis Alberto Maria is a 28 y.o. female, evaluated via audio-only technology on 4/15/2021 for Medication Evaluation      Assessment & Plan:   Diagnoses and all orders for this visit:    1. Anxiety: worsening, increase sertraline to 50 mg. Continue with outpatient therapy. -     sertraline (ZOLOFT) 50 mg tablet; Take 1 Tab by mouth daily. Subjective:   Would like to discuss increasing dose of sertraline. Currently in outpatient therapy with sessions every 2-3 weeks; has recently asked for weekly sessions. States that she is \"struggling\". Feels that anxiety has been worsening over the last month. Current stressors: son had recent medical procedure, family had COVID last month. States it was recommended that she dose increase. Prior to Admission medications    Medication Sig Start Date End Date Taking? Authorizing Provider   ibuprofen (MOTRIN) 600 mg tablet Take 1 Tab by mouth every six (6) hours as needed for Pain. 4/7/21  Yes Marquis Oleary MD   sertraline (ZOLOFT) 25 mg tablet TAKE 1 TABLET BY MOUTH EVERY DAY 3/25/21  Yes Marquis Oleary MD   albuterol (PROVENTIL HFA, VENTOLIN HFA, PROAIR HFA) 90 mcg/actuation inhaler INHALE 2 PUFFS BY MOUTH EVERY 4 HOURS AS NEEDED FOR WHEEZE 11/6/20  Yes Marquis Oleary MD   fluticasone propionate (FLONASE) 50 mcg/actuation nasal spray 2 Sprays by Both Nostrils route daily.  9/3/20  Yes Marquis Oleary MD   lansoprazole (PREVACID) 15 mg capsule TAKE 1 CAPSULE BY MOUTH EVERY DAY 5/4/20  Yes Provider, Historical   cetirizine (ZYRTEC) 10 mg tablet TAKE 1 TABLET BY MOUTH EVERY DAY 5/28/20  Yes Provider, Historical       Allergies   Allergen Reactions    Shellfish Derived Hives     Not allergic     Past Medical History:   Diagnosis Date    Asthma     Carpal tunnel syndrome     bilateral    [facility] ([facility] [address]     Environmental and seasonal allergies     GERD (gastroesophageal reflux disease)     Ill-defined condition     crohn's    Migraines     Psychiatric disorder     anxiety    PUD (peptic ulcer disease)     Vertigo      Social History     Tobacco Use    Smoking status: Former Smoker     Types: Cigarettes     Quit date: 2019     Years since quittin.9    Smokeless tobacco: Never Used   Substance Use Topics    Alcohol use: Yes     Alcohol/week: 1.0 standard drinks     Types: 1 Glasses of wine per week     Frequency: Monthly or less     Drinks per session: 1 or 2     Binge frequency: Never       ROS   Pertinent items noted in HPI    Patient-Reported Vitals 4/15/2021   Patient-Reported Weight 156lbs   Patient-Reported Pulse -        Kevinthomas Adam, who was evaluated through a patient-initiated, synchronous (real-time) audio only encounter, and/or her healthcare decision maker, is aware that it is a billable service, with coverage as determined by her insurance carrier. She provided verbal consent to proceed: Yes. She has not had a related appointment within my department in the past 7 days or scheduled within the next 24 hours. I was in the office. The patient was at home.        Total Time: minutes: 5-10 minutes    Eldon Brown MD

## 2021-04-15 NOTE — PROGRESS NOTES
Identified pt with two pt identifiers(name and ). Reviewed record in preparation for visit and have obtained necessary documentation. Chief Complaint   Patient presents with    Medication Evaluation        Health Maintenance Due   Topic    Hepatitis C Screening     Pneumococcal 0-64 years (1 of 1 - PPSV23)    PAP AKA CERVICAL CYTOLOGY        There were no vitals taken for this visit. Coordination of Care Questionnaire:  :   1) Have you been to an emergency room, urgent care, or hospitalized since your last visit? If yes, where when, and reason for visit? NO      2. Have seen or consulted any other health care provider since your last visit? If yes, where when, and reason for visit? NO      Patient is accompanied by SELF I have received verbal consent from Ponce Price to discuss any/all medical information while they are present in the room.

## 2021-06-16 RX ORDER — IBUPROFEN 600 MG/1
600 TABLET ORAL
Qty: 90 TABLET | Refills: 1 | Status: SHIPPED | OUTPATIENT
Start: 2021-06-16 | End: 2021-06-22

## 2021-06-16 RX ORDER — ALBUTEROL SULFATE 90 UG/1
AEROSOL, METERED RESPIRATORY (INHALATION)
Qty: 18 INHALER | Refills: 2 | Status: SHIPPED | OUTPATIENT
Start: 2021-06-16 | End: 2021-12-13 | Stop reason: SDUPTHER

## 2021-06-17 ENCOUNTER — TELEPHONE (OUTPATIENT)
Dept: FAMILY MEDICINE CLINIC | Age: 31
End: 2021-06-17

## 2021-06-17 NOTE — TELEPHONE ENCOUNTER
----- Message from Dedra Gowers sent at 6/17/2021  1:10 PM EDT -----  Regarding: Dr. Vanessa Ahumada: [phone]  Appointment not available    Caller's first and last name and relationship to patient (if not the patient): N/A      Best contact number:[phone]      Preferred date and time: First available, anytime      Scheduled appointment date and time: N/A      Reason for appointment: New patient appointment. Details to clarify the request: Doctors schedule is not in Epic.         Dedra Gowers

## 2021-06-22 ENCOUNTER — VIRTUAL VISIT (OUTPATIENT)
Dept: FAMILY MEDICINE CLINIC | Age: 31
End: 2021-06-22
Payer: COMMERCIAL

## 2021-06-22 DIAGNOSIS — Z76.89 ENCOUNTER TO ESTABLISH CARE: ICD-10-CM

## 2021-06-22 DIAGNOSIS — R41.840 DIFFICULTY CONCENTRATING: Primary | ICD-10-CM

## 2021-06-22 PROCEDURE — 99213 OFFICE O/P EST LOW 20 MIN: CPT | Performed by: STUDENT IN AN ORGANIZED HEALTH CARE EDUCATION/TRAINING PROGRAM

## 2021-06-22 NOTE — PROGRESS NOTES
Livia Hernandez  28 y.o. female  1990  [address] [facility] [address]  [id number]   [address]:    Telemedicine Progress Note  Josh Chavez DO       Encounter Date and Time: June 22, 2021 at 2:30 PM    Consent: Livia Hernandez, who was seen by synchronous (real-time) audio-video technology, and/or her healthcare decision maker, is aware that this patient-initiated, Telehealth encounter on 6/22/2021 is a billable service, with coverage as determined by her insurance carrier. She is aware that she may receive a bill and has provided verbal consent to proceed: Yes. Chief Complaint   Patient presents with    Establish Care     History of Present Illness   Livia Hernandez is a 28 y.o. female was evaluated by synchronous (real-time) audio-video technology from home, through a secure patient portal.    Patient presents to establish care    Patient would like to come in for a physical.    She is also concerned she may have ADHD. She feels she has trouble concentrating on one thing at a time, she feels disorganized, and she looses her train of thought often when she is speaking. She currently does not work however when she does work she has to keep a notebook to keep her thoughts organized. If she does not have her book she gets distracted and does not return to the activity she was doing. She took an online test and was told she is highly likely to have the condition. She sees a therapist for her anxiety and was told she may need to be evaluated for ADHD. Review of Systems   Review of Systems   Constitutional: Negative for chills, fever and weight loss. Cardiovascular: Negative for chest pain and palpitations. Neurological: Negative for dizziness, sensory change and headaches. Psychiatric/Behavioral: Negative for depression and suicidal ideas. The patient is nervous/anxious.         Vitals/Objective:     General: alert, cooperative, no distress   Mental  status: mental status: alert, oriented to person, place, and time, pressured speech, disorganized   Resp: resp: normal effort and no respiratory distress   Neuro: neuro: no gross deficits   Skin: skin: no discoloration or lesions of concern on visible areas   Due to this being a TeleHealth evaluation, many elements of the physical examination are unable to be assessed. Assessment and Plan:   28 y.o. F with a PMH of anxiety, asthma, allergies, and GERD presenting to establish care and difficulty concentrating. 1. Encounter to establish care  -PMH, MEds, and Allergies added to chart    2. Difficulty concentrating  -Patient reports a long standing history of difficulty with concentration. Was told by her therapist that she may need to be evaluated for ADHD. During visit patient did seem some what disorganized in her thoughts and certain questions had to be asked multiple times before question was answered. Patient seems to have symptoms of ADHD. Will refer for neuro pysch testing.  - REFERRAL TO NEUROPSYCHOLOGY        Time spent in direct conversation with the patient to include medical condition(s) discussed, assessment and treatment plan:       We discussed the expected course, resolution and complications of the diagnosis(es) in detail. Medication risks, benefits, costs, interactions, and alternatives were discussed as indicated. I advised her to contact the office if her condition worsens, changes or fails to improve as anticipated. She expressed understanding with the diagnosis(es) and plan. Patient understands that this encounter was a temporary measure, and the importance of further follow up and examination was emphasized. Patient verbalized understanding. Patient informed to follow up: Lysbeth Carrel Follow-up and Dispositions  ·   Return for in clinic for a physical .         Electronically Signed: Rancho Daley DO    CPT Codes 29392-56338 for Established Patients may apply to this Telehealth Visit.   POS code: Angel Worley is a 28 y.o. female who was evaluated by an audio-video encounter for concerns as above. Patient identification was verified prior to start of the visit. A caregiver was present when appropriate. Due to this being a TeleHealth encounter (During IQPMI-18 public health emergency), evaluation of the following organ systems was limited: Vitals/Constitutional/EENT/Resp/CV/GI//MS/Neuro/Skin/Heme-Lymph-Imm. Pursuant to the emergency declaration under the [facility] waiver authority and the [employer] Act, this Virtual Visit was conducted, with patient's (and/or legal guardian's) consent, to reduce the patient's risk of exposure to COVID-19 and provide necessary medical care. Services were provided through a synchronous discussion virtually to substitute for in-person clinic visit. I was at home. The patient was at home. History   Patients past medical, surgical and family histories were reviewed and updated.       Past Medical History:   Diagnosis Date    Asthma     Carpal tunnel syndrome     bilateral    Cro[facility]'s disease ([facility] [address]     Environmental and seasonal allergies     GERD (gastroesophageal reflux disease)     Ill-defined condition     crohn's    Migraines     Psychiatric disorder     anxiety    PUD (peptic ulcer disease)     Vertigo      Past Surgical History:   Procedure Laterality Date    COLONOSCOPY N/A 2019    COLONOSCOPY performed by Eugenia Alcaraz MD at [facility] ENDOSCOPY    HX  SECTION      HX OVARIAN CYST REMOVAL      HX TONSILLECTOMY       Family History   Problem Relation Age of Onset    Diabetes Mother     Hypertension Mother    [facility] Elevated Lipids Mother     Hypertension Father    [facility] Elevated Lipids Father      Social History     Tobacco Use    Smoking status: Former Smoker     Types: Cigarettes     Quit date: 2019     Years since quittin.1    Smokeless tobacco: Never Used   Substance Use Topics    Alcohol use: Yes     Alcohol/week: 1.0 standard drinks     Types: 1 Glasses of wine per week    Drug use: No     Patient Active Problem List   Diagnosis Code    Environmental and seasonal allergies J30.89    Crohn's disease ([facility] [address] K50.90    Asthma J45.909    GERD (gastroesophageal reflux disease) K21.9    Migraines G43.909    Vertigo R42          Current Medications/Allergies   Medications and Allergies reviewed:    Current Outpatient Medications   Medication Sig Dispense Refill    albuterol (PROVENTIL HFA, VENTOLIN HFA, PROAIR HFA) 90 mcg/actuation inhaler INHALE 2 PUFFS BY MOUTH EVERY 4 HOURS AS NEEDED FOR WHEEZE 18 Inhaler 2    sertraline (ZOLOFT) 50 mg tablet Take 1 Tab by mouth daily. 90 Tab 1    fluticasone propionate (FLONASE) 50 mcg/actuation nasal spray 2 Sprays by Both Nostrils route daily.  1 Bottle 5    lansoprazole (PREVACID) 15 mg capsule TAKE 1 CAPSULE BY MOUTH EVERY DAY      cetirizine (ZYRTEC) 10 mg tablet TAKE 1 TABLET BY MOUTH EVERY DAY       Allergies   Allergen Reactions    Shellfish Derived Hives     Not allergic

## 2021-06-23 ENCOUNTER — TELEPHONE (OUTPATIENT)
Dept: FAMILY MEDICINE CLINIC | Age: 31
End: 2021-06-23

## 2021-06-23 NOTE — TELEPHONE ENCOUNTER
----- Message from Hebert Chavez DO sent at 6/22/2021  2:43 PM EDT -----  Regarding: Physical  Hello    This patient will need an appointment in clinic for an annual physical with me.  Thank you    [facility]

## 2021-06-25 NOTE — PROGRESS NOTES
[address] Medicine Residency Attending Addendum:  Dr. Brenda Llamas, DO,  the patient and I were not physically present during this encounter. The resident and I are concurrently monitoring the patient care through appropriate telecommunication technology. I discussed the findings, assessment and plan with the resident and agree with the resident's findings and plan as documented in the resident's note.       Bert Wang MD

## 2021-07-12 DIAGNOSIS — J30.9 ALLERGIC RHINITIS, UNSPECIFIED SEASONALITY, UNSPECIFIED TRIGGER: ICD-10-CM

## 2021-07-12 RX ORDER — FLUTICASONE PROPIONATE 50 MCG
2 SPRAY, SUSPENSION (ML) NASAL DAILY
Qty: 1 BOTTLE | Refills: 5 | Status: SHIPPED | OUTPATIENT
Start: 2021-07-12 | End: 2021-11-05 | Stop reason: ALTCHOICE

## 2021-07-25 NOTE — PROGRESS NOTES
Clara Monteiro is a 28 y.o. female with a known history of depression and asthma who presents to clinic today for annual physical exam. COVID vaccinated -- Asim Herman. Previously seen by Dr. Chela Metcalf on 6/22/21, virtually. Expressed concerns regarding difficulty concentrating suggestive of possible ADHD. Referral to neuropsychology provided at that visit. # Chronic carpal tunnel syndrome: Diagnosed in 2014 -- states that her hands/fingers swell bilaterally when symptoms occur. She has been using wrist braces without significant improvement and recent worsening. Patient requesting for a referral to Dr. Thai Sheehan (neurologist). # Left earache: Started several weeks ago. Patient uses q-tips often and tends to scratch her inner ear throughout the day. Denies any discharge or acute worsening, but requesting for evaluation. # Chronic depression: On Zoloft, seen by a counselor regularly. Does not feel more depressed than usual. Well controlled. # Chronic back pain: Patient states that she has had chronic generalized back pain for years, more so worsening over the last 1-2 years after her second child was born. Denies any acute worsening, but is requesting for a referral to a chiropractor. Diet: Cooks everyday, fast food once a week, fruits, rice, beans, chicken, shrimp. Exercise: Has two kids, one is 5 and the other 1. 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 Tobacco use: Former, quit 2 years ago. Very casual.  Alcohol use: Every weekend, glass of wine, no hard liquor. Drug use: Denies current use. Family History (HLD/DM/CAD/HTN):   Father: HLD and HTN, anxiety, prediabetes  Mother: HLD and HTN, depression/anxiety    Review of Systems:  Review of Systems   Constitutional: Negative for chills and fever. HENT: Positive for ear pain. Respiratory: Negative for shortness of breath. Cardiovascular: Negative for chest pain. Gastrointestinal: Negative for abdominal pain, diarrhea, nausea and vomiting. Musculoskeletal: Positive for back pain. All other systems reviewed and are negative. Gyn History  Contraception: Copper IUD -- thinking about removing it. LMP: LMP ended on July 4th Usually last a week to 10 days. Only 4 menstrual cycles this year -- very heavy. Followed by an OB/GYN: Last 2 months ago seen by OB  Pap smears: Had a colposcopy for abnormal pap about 5 years. Performed earlier this year. Any family history of gyn cancers Unsure, think maternal aunt may have breast cysts. Sexual History:  Sexually active?: Yes  Number of sexual partners: 1  Type of sexual partners: Heterosexual, NO anal intercourse  Method of family planning: Copper IUD    Preventative care:  Depression screening: Chronic depression -- on Zoloft and seen regularly by a counselor. Tdap: 2/15/2020       Past Medical History  Past Medical History:   Diagnosis Date    Asthma     Carpal tunnel syndrome     bilateral    Crohn's disease ([facility] [address]     Environmental and seasonal allergies     GERD (gastroesophageal reflux disease)     Ill-defined condition     crohn's    Migraines     Psychiatric disorder     anxiety    PUD (peptic ulcer disease)     Vertigo        Current Medications   Current Outpatient Medications on File Prior to Visit   Medication Sig Dispense Refill    fluticasone propionate (FLONASE) 50 mcg/actuation nasal spray 2 Sprays by Both Nostrils route daily. 1 Bottle 5    albuterol (PROVENTIL HFA, VENTOLIN HFA, PROAIR HFA) 90 mcg/actuation inhaler INHALE 2 PUFFS BY MOUTH EVERY 4 HOURS AS NEEDED FOR WHEEZE 18 Inhaler 2    sertraline (ZOLOFT) 50 mg tablet Take 1 Tab by mouth daily. 90 Tab 1    lansoprazole (PREVACID) 15 mg capsule TAKE 1 CAPSULE BY MOUTH EVERY DAY      cetirizine (ZYRTEC) 10 mg tablet TAKE 1 TABLET BY MOUTH EVERY DAY       No current facility-administered medications on file prior to visit. Surgical History  Past Surgical History:   Procedure Laterality Date    COLONOSCOPY N/A 2019    COL[facility]fonso [facility]cial [facility] at [facility]    HX  SECTION      HX OVARIAN CYST REMOVAL      HX TONSILLECTOMY         Family History   Family History   Problem Relation Age of Onset    Diabetes Mother     Hypertension Mother     Elevated Lipids Mother     Hypertension Father    [facility] Elevated Lipids Father        Social History  Social History     Socioeconomic History    Marital status: SINGLE     Spouse name: Not on file    Number of children: Not on file    Years of education: Not on file    Highest education level: Not on file   Occupational History    Not on file   Tobacco Use    Smoking status: Former Smoker     Types: Cigarettes     Quit date: 2019     Years since quittin.2    Smokeless tobacco: Never Used   Substance and Sexual Activity    Alcohol use: Yes     Alcohol/week: 1.0 standard drinks     Types: 1 Glasses of wine per week    Drug use: No    Sexual activity: Yes     Partners: Male   Other Topics Concern    Not on file   Social History Narrative    Not on file     Social Determinants of Health     Financial Resource Strain:     Difficulty of Paying Living Expenses:    Food Insecurity:     Worried About Running Out of Food in the Last Year:     [address] in the Last Year:    Transportation Needs:     Lack of Transportation (Medical):      Lack of Transportation (Non-Medical):    Physical Activity:     Days of Exercise per Week:     Minutes of Exercise per Session:    Stress:     Feeling of Stress :    Social Connections:     Frequency of Communication with Friends and Family:     Frequency of Social Gatherings with Friends and Family:     Attends Voodoo Services:     Active Member of Clubs or Organizations:     Attends Club or Organization Meetings:     Marital Status:    Intimate Partner Violence:     Fear of Current or Ex-Partner:     Emotionally Abused:     Physically Abused:     Sexually Abused:          Objective   Vital Signs  Visit Vitals  /67 (BP 1 Location: Left upper arm, BP Patient Position: Sitting)   Pulse 80   Temp 97.6 °F (36.4 °C) (Oral)   Resp 20   Ht 5' 4.02\" (1.626 m)   Wt 163 lb (73.9 kg)   SpO2 98%   BMI 27.96 kg/m²       PHYSICAL EXAM   General appearance - alert, well appearing, and in no distress  Ears - Difficulty visualizing both TM's 2/2 significant cerumen. No discharge notable. Pinna non-erythematous bilaterally. Nose - normal and patent, no erythema, discharge or polyps  Mouth - mucous membranes moist, pharynx normal without lesions  Neck - supple, no significant adenopathy, thyroid exam: thyroid is normal in size without nodules or tenderness  Chest - clear to auscultation, no wheezes, rales or rhonchi, symmetric air entry  Heart - normal rate, regular rhythm, normal S1, S2, no murmurs, rubs, clicks or gallops  Abdomen - soft, nontender, nondistended, no masses or organomegaly  Neurological - alert, oriented, normal speech, no focal findings or movement disorder noted  Musculoskeletal - no joint tenderness, deformity or swelling  Extremities - no pedal edema noted  Skin - normal coloration and turgor, no rashes, no suspicious skin lesions noted  Pelvic - Deferred  Breast - Deferred    Assessment   Edward Michael is a 28 y.o. female presenting to clinic today for routine annual exam.    Plan   # Routine health maintenance of adult: Pap smear + general /breast health managed by her OB, Dr. Ced Barcenas whom she last saw about 2 months ago. - CBC w/ diff  - BMP  - Lipid panel  - Hep C Ab  - Discussed importance of diet and exercise. Emphasized a healthy diet avoiding sugary drinks, fried foods, fast food, and packaged foods. Encouraged to increase workout to at least 30 minutes per day of moderate aerobic exercise for more than 5 times per week.      - Return for yearly wellness visits    # Chronic carpal tunnel syndrome:  - Referral to Dr. Braden Staff (neurologist) provided. # L earache: No evidence of acute infection.   - Advised patient try using OTC ear drops  - Advised patient to seek sooner follow up if symptoms do not improve with conservative management  - General instructions provided in AVS    # Chronic back pain:   - Advised to check with insurance eligible providers -- none to refer to from [facility]. I discussed the aforementioned diagnoses with the patient as well as the plan of care. All questions were answered and an AVS was provided.      I discussed this patient with Dr. Leslye Meyers (Attending Physician)      Signed By:  Rakesh Edmond MD    Family Medicine Resident

## 2021-07-26 ENCOUNTER — OFFICE VISIT (OUTPATIENT)
Dept: FAMILY MEDICINE CLINIC | Age: 31
End: 2021-07-26
Payer: COMMERCIAL

## 2021-07-26 VITALS
OXYGEN SATURATION: 98 % | HEIGHT: 64 IN | HEART RATE: 80 BPM | SYSTOLIC BLOOD PRESSURE: 100 MMHG | TEMPERATURE: 97.6 F | RESPIRATION RATE: 20 BRPM | WEIGHT: 163 LBS | DIASTOLIC BLOOD PRESSURE: 67 MMHG | BODY MASS INDEX: 27.83 KG/M2

## 2021-07-26 DIAGNOSIS — H92.02 EAR PAIN, LEFT: ICD-10-CM

## 2021-07-26 DIAGNOSIS — M54.9 CHRONIC BACK PAIN, UNSPECIFIED BACK LOCATION, UNSPECIFIED BACK PAIN LATERALITY: ICD-10-CM

## 2021-07-26 DIAGNOSIS — G89.29 CHRONIC BACK PAIN, UNSPECIFIED BACK LOCATION, UNSPECIFIED BACK PAIN LATERALITY: ICD-10-CM

## 2021-07-26 DIAGNOSIS — Z00.00 ROUTINE HISTORY AND PHYSICAL EXAMINATION OF ADULT: Primary | ICD-10-CM

## 2021-07-26 DIAGNOSIS — G56.03 CARPAL TUNNEL SYNDROME ON BOTH SIDES: ICD-10-CM

## 2021-07-26 PROCEDURE — 99395 PREV VISIT EST AGE 18-39: CPT

## 2021-07-26 NOTE — PROGRESS NOTES
Chief Complaint   Patient presents with    Complete Physical     Patient present for complete physical.     Other     Patient requesting referral to neurology appt is schedule for Aug 16, with Dr. Edwin Michel ([place])     1. Have you been to the ER, urgent care clinic since your last visit? Hospitalized since your last visit? 2. Have you seen or consulted any other health care providers outside of the [address] [place] [address] since your last visit? Include any pap smears or colon screening.      Visit Vitals  /67 (BP 1 Location: Left upper arm, BP Patient Position: Sitting)   Pulse 80   Temp 97.6 °F (36.4 °C) (Oral)   Resp 20   Ht 5' 4.02\" (1.626 m)   Wt 163 lb (73.9 kg)   SpO2 98%   BMI 27.96 kg/m²

## 2021-07-26 NOTE — PATIENT INSTRUCTIONS
- Obtained routine blood work including a CBC, BMP, and lipid panel to evaluate your general health and cholesterol levels. - Provided a referral to neurology for your chronic carpal tunnel syndrome  - For the left-sided earache, please avoid using Q-tips or touching the ear. Follow the instructions below. OTC eardrops would be appropriate. Please schedule a closer follow up if symptoms do not improve. - For the chronic back pain -- please check with your insurance company to see if they have any providers within your plan. - Otherwise, follow up in a year for routine physical examination. Earwax Blockage: Care Instructions  Your Care Instructions     Earwax is a natural substance that protects the ear canal. Normally, earwax drains from the ears and does not cause problems. Sometimes earwax builds up and hardens. Earwax blockage (also called cerumen impaction) can cause some loss of hearing and pain. When wax is tightly packed, you will need to have your doctor remove it. Follow-up care is a key part of your treatment and safety. Be sure to make and go to all appointments, and call your doctor if you are having problems. It's also a good idea to know your test results and keep a list of the medicines you take. How can you care for yourself at home? · Do not try to remove earwax with cotton swabs, fingers, or other objects. This can make the blockage worse and damage the eardrum. · If your doctor recommends that you try to remove earwax at home:  ? Soften and loosen the earwax with warm mineral oil. You also can try hydrogen peroxide mixed with an equal amount of room temperature water. Place 2 drops of the fluid, warmed to body temperature, in the ear two times a day for up to 5 days. ? Once the wax is loose and soft, all that is usually needed to remove it from the ear canal is a gentle, warm shower. Direct the water into the ear, then tip your head to let the earwax drain out.  Dry your ear thoroughly with a hair dryer set on low. Hold the dryer several inches from your ear. ? If the warm mineral oil and shower do not work, use an over-the-counter wax softener. Read and follow all instructions on the label. After using the wax softener, use an ear syringe to gently flush the ear. Make sure the flushing solution is body temperature. Cool or hot fluids in the ear can cause dizziness. When should you call for help? Call your doctor now or seek immediate medical care if:    · Pus or blood drains from your ear.     · Your ears are ringing or feel full.     · You have a loss of hearing. Watch closely for changes in your health, and be sure to contact your doctor if:    · You have pain or reduced hearing after 1 week of home treatment.     · You have any new symptoms, such as nausea or balance problems. Where can you learn more? Go to http://www.gray.com/  Enter Q495 in the search box to learn more about \"Earwax Blockage: Care Instructions. \"  Current as of: February 26, 2020               Content Version: 12.8  © 7665-4798 [employer]. Care instructions adapted under license by [employer] (which disclaims liability or warranty for this information). If you have questions about a medical condition or this instruction, always ask your healthcare professional. [place] [address] any warranty or liability for your use of this information.

## 2021-07-27 LAB
ANION GAP SERPL CALC-SCNC: 7 MMOL/L (ref 5–15)
BASOPHILS # BLD: 0.1 K/UL (ref 0–0.1)
BASOPHILS NFR BLD: 1 % (ref 0–1)
BUN SERPL-MCNC: 12 MG/DL (ref 6–20)
BUN/CREAT SERPL: 19 (ref 12–20)
CALCIUM SERPL-MCNC: 8.9 MG/DL (ref 8.5–10.1)
CHLORIDE SERPL-SCNC: 105 MMOL/L (ref 97–108)
CHOLEST SERPL-MCNC: 159 MG/DL
CO2 SERPL-SCNC: 27 MMOL/L (ref 21–32)
CREAT SERPL-MCNC: 0.62 MG/DL (ref 0.55–1.02)
DIFFERENTIAL METHOD BLD: NORMAL
EOSINOPHIL # BLD: 0.2 K/UL (ref 0–0.4)
EOSINOPHIL NFR BLD: 3 % (ref 0–7)
ERYTHROCYTE [DISTWIDTH] IN BLOOD BY AUTOMATED COUNT: 12.9 % (ref 11.5–14.5)
GLUCOSE SERPL-MCNC: 87 MG/DL (ref 65–100)
HCT VFR BLD AUTO: 40.6 % (ref 35–47)
HCV AB SERPL QL IA: NONREACTIVE
HCV COMMENT,HCGAC: NORMAL
HDLC SERPL-MCNC: 58 MG/DL
HDLC SERPL: 2.7 {RATIO} (ref 0–5)
HGB BLD-MCNC: 12.8 G/DL (ref 11.5–16)
IMM GRANULOCYTES # BLD AUTO: 0 K/UL (ref 0–0.04)
IMM GRANULOCYTES NFR BLD AUTO: 0 % (ref 0–0.5)
LDLC SERPL CALC-MCNC: 83.8 MG/DL (ref 0–100)
LYMPHOCYTES # BLD: 1.8 K/UL (ref 0.8–3.5)
LYMPHOCYTES NFR BLD: 25 % (ref 12–49)
MCH RBC QN AUTO: 28.1 PG (ref 26–34)
MCHC RBC AUTO-ENTMCNC: 31.5 G/DL (ref 30–36.5)
MCV RBC AUTO: 89.2 FL (ref 80–99)
MONOCYTES # BLD: 0.4 K/UL (ref 0–1)
MONOCYTES NFR BLD: 6 % (ref 5–13)
NEUTS SEG # BLD: 4.8 K/UL (ref 1.8–8)
NEUTS SEG NFR BLD: 65 % (ref 32–75)
NRBC # BLD: 0 K/UL (ref 0–0.01)
NRBC BLD-RTO: 0 PER 100 WBC
PLATELET # BLD AUTO: 297 K/UL (ref 150–400)
PMV BLD AUTO: 9.7 FL (ref 8.9–12.9)
POTASSIUM SERPL-SCNC: 4.2 MMOL/L (ref 3.5–5.1)
RBC # BLD AUTO: 4.55 M/UL (ref 3.8–5.2)
SODIUM SERPL-SCNC: 139 MMOL/L (ref 136–145)
TRIGL SERPL-MCNC: 86 MG/DL (ref ?–150)
VLDLC SERPL CALC-MCNC: 17.2 MG/DL
WBC # BLD AUTO: 7.3 K/UL (ref 3.6–11)

## 2021-07-27 NOTE — PROGRESS NOTES
All labs unremarkable with exception of slight elevation of LDL from previous blood work -- minimal change, but will advise lifestyle modifications.

## 2021-08-25 ENCOUNTER — OFFICE VISIT (OUTPATIENT)
Dept: NEUROLOGY | Age: 31
End: 2021-08-25
Payer: COMMERCIAL

## 2021-08-25 DIAGNOSIS — R41.840 INATTENTION: ICD-10-CM

## 2021-08-25 DIAGNOSIS — F41.1 GENERALIZED ANXIETY DISORDER: Primary | ICD-10-CM

## 2021-08-25 PROCEDURE — 90791 PSYCH DIAGNOSTIC EVALUATION: CPT | Performed by: CLINICAL NEUROPSYCHOLOGIST

## 2021-08-25 NOTE — PROGRESS NOTES
[address] [facility],5Th Floor  [address]   [place] [address] [place] [address] [address]   [phone] Office   [phone] Fax      Neuropsychology    Initial Diagnostic Interview Note      Referral:  DO Yudelka Mcgee is a 28 y.o. partnered female who was unaccompanied to the initial clinical interview on 8/25/21. Please refer to her medical records for details pertaining to her history. At the start of the appointment, I reviewed the patient's [facility] Epic Chart (including Media scanned in from previous providers) for the active Problem List, all pertinent Past Medical Hx, medications, recent radiologic and laboratory findings. In addition, I reviewed pt's documented Immunization Record and Encounter History. SHe works in a [employer] center. High school completed without history of previously diagnosed LD and/or receipt of special education services. As she reflects back on her life, she has realized she has always had a hard time with focus and concentration and certainly feels worse of late. Not sure if she is noticing it more. Attention all over the place. Easily distracted. Starts tasks and does not complete. Has a million things going on and not finishing them. Partner notices. Hard to stay organized. Worse after childbirth - she has a 11year old and 3year old. She has a hard time just doing one thing at a time. She is disorganized. Has to write everything down in a notebook, or it didn't happen. Even if she did write it, if she doesn't remember it's written down, it didn't happen. She took an online test which said she had ADD and should get evaluated. Spoke with PCP and therapist and all have suggested evaluation. She has been dealing with insomnia for most of her life. Appetite is fine. She has migraines. She also has anxiety. She works late. 3year old not sleeping all that well.  Can't turn mind off at night.      No previous neuropsych. Neuropsychological Mental Status Exam (NMSE):    Historian: Good  Praxis: No UE apraxia  R/L Orientation: Intact to self and to other  Dress: within normal limits   Weight: within normal limits   Appearance/Hygiene: within normal limits   Gait: within normal limits   Assistive Devices: None  Mood: within normal limits   Affect: within normal limits   Comprehension: within normal limits   Thought Process: within normal limits   Expressive Language: within normal limits   Receptive Language: within normal limits   Motor:  No cognitive or motor perseveration  ETOH: 1-2 glasses of wine rarely  Tobacco: Denied  Marijuana: Denied  Illicit: Denied  SI/HI: Denied  Psychosis: Denied  Insight: Within normal limits  Judgment: Within normal limits  Other Psych:      Past Medical History:   Diagnosis Date    Asthma     Carpal tunnel syndrome     bilateral    Crohn's disease ([address]     Environmental and seasonal allergies     GERD (gastroesophageal reflux disease)     Ill-defined condition     crohn's    Migraines     Psychiatric disorder     anxiety    PUD (peptic ulcer disease)     Vertigo        Past Surgical History:   Procedure Laterality Date    COLONOSCOPY N/A 2019    COLONOSCOPY performed by [facility] ENDOSCOPY    HX  SECTION      HX OVARIAN CYST REMOVAL      HX TONSILLECTOMY         Allergies   Allergen Reactions    Shellfish Derived Hives     Not allergic       Family History   Problem Relation Age of Onset    Diabetes Mother     Hypertension Mother    [facility] Elevated Lipids Mother     Hypertension Father    [facility] Elevated Lipids Father        Social History     Tobacco Use    Smoking status: Former Smoker     Types: Cigarettes     Quit date: 2019     Years since quittin.3    Smokeless tobacco: Never Used   Substance Use Topics    Alcohol use:  Yes     Alcohol/week: 1.0 standard drinks     Types: 1 Glasses of wine per week    Drug use: No       Current Outpatient Medications   Medication Sig Dispense Refill    fluticasone propionate (FLONASE) 50 mcg/actuation nasal spray 2 Sprays by Both Nostrils route daily. 1 Bottle 5    albuterol (PROVENTIL HFA, VENTOLIN HFA, PROAIR HFA) 90 mcg/actuation inhaler INHALE 2 PUFFS BY MOUTH EVERY 4 HOURS AS NEEDED FOR WHEEZE 18 Inhaler 2    sertraline (ZOLOFT) 50 mg tablet Take 1 Tab by mouth daily. 90 Tab 1    lansoprazole (PREVACID) 15 mg capsule TAKE 1 CAPSULE BY MOUTH EVERY DAY      cetirizine (ZYRTEC) 10 mg tablet TAKE 1 TABLET BY MOUTH EVERY DAY           Plan:  Obtain authorization for testing from insurance company. Report to follow once testing, scoring, and interpretation completed. ? Organic based neurocognitive issues versus mood disorder or combination of same. ? Problems organic, functional, or both? This note will not be viewable in [address]

## 2021-08-31 ENCOUNTER — TELEPHONE (OUTPATIENT)
Dept: NEUROLOGY | Age: 31
End: 2021-08-31

## 2021-09-07 DIAGNOSIS — F41.9 ANXIETY: ICD-10-CM

## 2021-09-28 ENCOUNTER — OFFICE VISIT (OUTPATIENT)
Dept: FAMILY MEDICINE CLINIC | Age: 31
End: 2021-09-28
Payer: COMMERCIAL

## 2021-09-28 ENCOUNTER — TELEPHONE (OUTPATIENT)
Dept: FAMILY MEDICINE CLINIC | Age: 31
End: 2021-09-28

## 2021-09-28 VITALS
HEIGHT: 64 IN | TEMPERATURE: 98.2 F | WEIGHT: 168 LBS | SYSTOLIC BLOOD PRESSURE: 97 MMHG | BODY MASS INDEX: 28.68 KG/M2 | RESPIRATION RATE: 16 BRPM | HEART RATE: 75 BPM | OXYGEN SATURATION: 98 % | DIASTOLIC BLOOD PRESSURE: 63 MMHG

## 2021-09-28 DIAGNOSIS — M94.0 COSTOCHONDRITIS: ICD-10-CM

## 2021-09-28 DIAGNOSIS — R07.89 XIPHOIDALGIA: Primary | ICD-10-CM

## 2021-09-28 PROCEDURE — 99213 OFFICE O/P EST LOW 20 MIN: CPT | Performed by: STUDENT IN AN ORGANIZED HEALTH CARE EDUCATION/TRAINING PROGRAM

## 2021-09-28 RX ORDER — METHYLPREDNISOLONE 4 MG/1
TABLET ORAL
COMMUNITY
Start: 2021-09-25 | End: 2021-11-05 | Stop reason: ALTCHOICE

## 2021-09-28 NOTE — TELEPHONE ENCOUNTER
Pt called to office for status of message she left on yesterday as Urgent with call center. I was able to retreive message she was referring to. Pt notes she ended up going to [facility]terday. Asked if she was in pain at call? Pt states no real pain, just a little dull ache.  Pt sched for today.    ----- Message from Julalli Cortez sent at 9/27/2021  8:05 AM EDT -----  Regarding: /telephone  Contact: [phone]  Level 1/Escalated Issue      Caller's first and last name and relationship (if not the patient): n/a       Best contact number(s): [phone]      What are the symptoms: Strong chest pain      Transfer successful - yes/no (include outcome): no, no answer      Transfer declined - yes/no (include reason): no, no answer      Was caller advised to seek appropriate level of care - yes/no: Yes      Details to clarify the request: Catalina Hawthorne 9

## 2021-09-28 NOTE — PROGRESS NOTES
Zoya Louise is a 28 y.o. female who presents for chest pain. [facility] and most recently a couple days ago. EKG and Chest Xray reportedly normal there. Diagnosed with costochondritis, prescribed prednisone. Pain with rest that worsens with movement. Non pleuritic. Sub sternal.  Reproducible with palpation. Pain worse when she lays on sides. No SOB Does have GERD and chrons disease, does take prevacid, this feels different that heartburn, denies coffee ground emesis or blood in her stool, not associated with eating. No recent viral infections. Patient did start exercising prior to onset of this chest pain. Past Medical History  Past Medical History:   Diagnosis Date    Asthma     Carpal tunnel syndrome     bilateral    [address]'s [address] ([facility] [address] [facility]     Environmental and seasonal allergies     GERD (gastroesophageal reflux disease)     Ill-defined condition     crohn's    Migraines     Psychiatric disorder     anxiety    PUD (peptic ulcer disease)     Vertigo        Current Medications  Current Outpatient Medications   Medication Sig Dispense Refill    sertraline (ZOLOFT) 50 mg tablet Take 1 Tablet by mouth daily. 90 Tablet 1    methylPREDNISolone (MEDROL DOSEPACK) 4 mg tablet  (Patient not taking: Reported on 9/28/2021)      fluticasone propionate (FLONASE) 50 mcg/actuation nasal spray 2 Sprays by Both Nostrils route daily.  (Patient not taking: Reported on 9/28/2021) 1 Bottle 5    albuterol (PROVENTIL HFA, VENTOLIN HFA, PROAIR HFA) 90 mcg/actuation inhaler INHALE 2 PUFFS BY MOUTH EVERY 4 HOURS AS NEEDED FOR WHEEZE (Patient not taking: Reported on 9/28/2021) 18 Inhaler 2    lansoprazole (PREVACID) 15 mg capsule TAKE 1 CAPSULE BY MOUTH EVERY DAY (Patient not taking: Reported on 9/28/2021)      cetirizine (ZYRTEC) 10 mg tablet TAKE 1 TABLET BY MOUTH EVERY DAY (Patient not taking: Reported on 9/28/2021)         Allergies  Allergies   Allergen Reactions    Shellfish Derived Hives     Not allergic       Social History   Social History     Socioeconomic History    Marital status: SINGLE     Spouse name: Not on file    Number of children: Not on file    Years of education: Not on file    Highest education level: Not on file   Occupational History    Not on file   Tobacco Use    Smoking status: Former Smoker     Types: Cigarettes     Quit date: 2019     Years since quittin.4    Smokeless tobacco: Never Used   Substance and Sexual Activity    Alcohol use: Yes     Alcohol/week: 1.0 standard drinks     Types: 1 Glasses of wine per week    Drug use: No    Sexual activity: Yes     Partners: Male   Other Topics Concern    Not on file   Social History Narrative    Not on file     Social Determinants of Health     Financial Resource Strain:     Difficulty of Paying Living Expenses:    Food Insecurity:     Worried About Running Out of Food in the Last Year:     [address] in the Last Year:    Transportation Needs:     Lack of Transportation (Medical):      Lack of Transportation (Non-Medical):    Physical Activity:     Days of Exercise per Week:     Minutes of Exercise per Session:    Stress:     Feeling of Stress :    Social Connections:     Frequency of Communication with Friends and Family:     Frequency of Social Gatherings with Friends and Family:     Attends [employer] Services:     Active Member of Clubs or Organizations:     Attends Club or Organization Meetings:     Marital Status:    Intimate Partner Violence:     Fear of Current or Ex-Partner:     Emotionally Abused:     Physically Abused:     Sexually Abused:        Family History  Family History   Problem Relation Age of Onset    Diabetes Mother     Hypertension Mother     Elevated Lipids Mother     Hypertension Father     Elevated Lipids Father        Objective   Vital Signs  Visit Vitals  BP 97/63 (BP 1 Location: Left upper arm, BP Patient Position: Sitting, BP Cuff Size: Adult)   Pulse 75   Temp 98.2 °F (36.8 °C) (Oral)   Resp 16   Ht 5' 4\" (1.626 m)   Wt 168 lb (76.2 kg)   SpO2 98%   BMI 28.84 kg/m²       Physical Examination  Physical Exam  Cardiovascular:      Rate and Rhythm: Normal rate and regular rhythm. Pulses: Normal pulses. Heart sounds: Normal heart sounds. Comments: No pleuritic rub  Pulmonary:      Effort: Pulmonary effort is normal.      Breath sounds: Normal breath sounds. Abdominal:      General: Abdomen is flat. Palpations: Abdomen is soft. Tenderness: There is no abdominal tenderness. Musculoskeletal:         General: Normal range of motion. Comments: Pain with palpation primarily at the sternoid-xyphoid process junction. Milder pain at the sternochondral junction, ribs 4-6. Porsha Figueredo is a 28 y.o. who is here for Xiphoidalgia and costochondritis. Low suspicion for cardiopulmonary etiology given reproducibility. Low likliehood of PUD, however given Chron's disease it is possible. Plan   Diagnoses and all orders for this visit:    1. Xiphoidalgia/Costochondritis  - Patient to complete medrol 4mg 6-day dose pack (24mg starting dose) provided by [facility]  - Advised to take medrol with food. Given history of chrons, will have patient take pepcid BID with  prevacid daily while on steroids  - Advised to refrain from intense exercise. Ice/heat prn      Follow up in 2 weeks with sports medicine, can discuss possible injection if pain still present. Patient with history of bilateral carpel tunnel she would like to discuss at this visit as well, has been wearing braces at night for over a month, has neurology appointment in November but is interested in injections. Patient is counseled to return to the office if symptoms do not improve as expected. Urgent consultation with the nearest Emergency Department is strongly recommended if condition worsens.   Patient is counseled to follow up as recommended and to inform the office if any changes in treatment are recommended.       I discussed this patient with Dr. Ingrid Kumar (Attending Physician)       Signed By:  Anu Renae DO    Family Medicine Resident

## 2021-10-12 ENCOUNTER — OFFICE VISIT (OUTPATIENT)
Dept: FAMILY MEDICINE CLINIC | Age: 31
End: 2021-10-12
Payer: COMMERCIAL

## 2021-10-12 VITALS
HEIGHT: 64 IN | OXYGEN SATURATION: 98 % | RESPIRATION RATE: 18 BRPM | BODY MASS INDEX: 28.68 KG/M2 | HEART RATE: 75 BPM | DIASTOLIC BLOOD PRESSURE: 70 MMHG | SYSTOLIC BLOOD PRESSURE: 107 MMHG | WEIGHT: 168 LBS | TEMPERATURE: 98.1 F

## 2021-10-12 DIAGNOSIS — G56.03 BILATERAL CARPAL TUNNEL SYNDROME: Primary | ICD-10-CM

## 2021-10-12 DIAGNOSIS — R07.89 STERNAL PAIN: ICD-10-CM

## 2021-10-12 PROCEDURE — 64450 NJX AA&/STRD OTHER PN/BRANCH: CPT | Performed by: FAMILY MEDICINE

## 2021-10-12 PROCEDURE — 99215 OFFICE O/P EST HI 40 MIN: CPT | Performed by: FAMILY MEDICINE

## 2021-10-12 PROCEDURE — 76942 ECHO GUIDE FOR BIOPSY: CPT | Performed by: FAMILY MEDICINE

## 2021-10-12 RX ORDER — TRIAMCINOLONE ACETONIDE 40 MG/ML
40 INJECTION, SUSPENSION INTRA-ARTICULAR; INTRAMUSCULAR ONCE
Qty: 1 ML | Refills: 0
Start: 2021-10-12 | End: 2021-10-12

## 2021-10-12 RX ORDER — LIDOCAINE HYDROCHLORIDE 10 MG/ML
8 INJECTION, SOLUTION EPIDURAL; INFILTRATION; INTRACAUDAL; PERINEURAL ONCE
Qty: 8 ML | Refills: 0
Start: 2021-10-12 | End: 2021-10-12

## 2021-10-12 NOTE — PROGRESS NOTES
Identified Patient with two Patient identifiers (Name and ). Two Patient Identifiers confirmed. Reviewed record in preparation for visit and have obtained necessary documentation. Chief Complaint   Patient presents with    Chest Pain     Follow up per Dr. Mahesh Meyer - patient improved on steriods but patient did not finish d/t stomach pain, pain still present. 2/10 pain currently.  Wrist Pain     bilateral wrist pain since , getting worse. Currently not having any pain. Visit Vitals  /70 (BP 1 Location: Left upper arm, BP Patient Position: Sitting, BP Cuff Size: Adult)   Pulse 75   Temp 98.1 °F (36.7 °C) (Oral)   Resp 18   Ht 5' 4\" (1.626 m)   Wt 168 lb (76.2 kg)   SpO2 98%   BMI 28.84 kg/m²       1. Have you been to the ER, urgent care clinic since your last visit? Hospitalized since your last visit? No    2. Have you seen or consulted any other health care providers outside of the [address] [place] [address] since your last visit? Include any pap smears or colon screening.  No

## 2021-10-12 NOTE — PROGRESS NOTES
History of Present Illness     Chief Complaint   Patient presents with    Chest Pain     Follow up per Dr. Kyle Awad - patient improved on steriods but patient did not finish d/t stomach pain, pain still present. 2/10 pain currently.  Wrist Pain     bilateral wrist pain since 2014, getting worse. Currently not having any pain. Patient Identification  Kwaku Torres is a 28 y.o. female complains of pain in sternum pain. [facility] over the past month, did EKG and told everything was normal.      Date of Onset: 1 month  Mechanism of Injury: leaning on rail of the crib  Alleviating Factors:  Leaning on rail  Aggravating Factors: Steroids helped but couldn't complete     Still breastfeeding at night. No SOB, no palpitations and no coughing. Complaints of bilateral wrist pain and numbness for the past 10 years. On and off, worse at night and flicks wrists. No neck pain or issues. Uses night splints for years and helps some but not that much. Past Medical History:   Diagnosis Date    Asthma     Carpal tunnel syndrome     bilateral    [address]'s disease ([facility] [address]     Environmental and seasonal allergies     GERD (gastroesophageal reflux disease)     Ill-defined condition     crohn's    Migraines     Psychiatric disorder     anxiety    PUD (peptic ulcer disease)     Vertigo      Family History   Problem Relation Age of Onset    Diabetes Mother     Hypertension Mother     Elevated Lipids Mother     Hypertension Father    [facility] Elevated Lipids Father      Current Outpatient Medications   Medication Sig Dispense Refill    triamcinolone acetonide (KENALOG-40) 40 mg/mL injection 1 mL by Other route once for 1 dose. 1 mL 0    lidocaine, PF, (XYLOCAINE) 10 mg/mL (1 %) injection 8 mL by Other route once for 1 dose. 8 mL 0    sertraline (ZOLOFT) 50 mg tablet Take 1 Tablet by mouth daily.  90 Tablet 1    albuterol (PROVENTIL HFA, VENTOLIN HFA, PROAIR HFA) 90 mcg/actuation inhaler INHALE 2 PUFFS BY MOUTH EVERY 4 HOURS AS NEEDED FOR WHEEZE 18 Inhaler 2    lansoprazole (PREVACID) 15 mg capsule TAKE 1 CAPSULE BY MOUTH EVERY DAY      methylPREDNISolone (MEDROL DOSEPACK) 4 mg tablet  (Patient not taking: Reported on 9/28/2021)      fluticasone propionate (FLONASE) 50 mcg/actuation nasal spray 2 Sprays by Both Nostrils route daily. (Patient not taking: Reported on 9/28/2021) 1 Bottle 5    cetirizine (ZYRTEC) 10 mg tablet TAKE 1 TABLET BY MOUTH EVERY DAY (Patient not taking: Reported on 9/28/2021)       Allergies   Allergen Reactions    Shellfish Derived Hives     Not allergic       Review of Systems  A comprehensive review of systems was negative except for that written in the HPI. Physical Exam     Visit Vitals  /70 (BP 1 Location: Left upper arm, BP Patient Position: Sitting, BP Cuff Size: Adult)   Pulse 75   Temp 98.1 °F (36.7 °C) (Oral)   Resp 18   Ht 5' 4\" (1.626 m)   Wt 168 lb (76.2 kg)   LMP 10/06/2021 (Approximate)   SpO2 98%   BMI 28.84 kg/m²       GEN: Well appearing. No apparent distress. Responds to all questions appropriately. Lungs: No labored respirations. Talking in complete sentences without difficulty.     MSK: Neck    ROM:  Flexion: 45  Extension: 50  Rotation to Left: 60  Rotation to Right: 60  Lateral Bending to Right: 40  Lateral Bending to Left: 40    Palpation:  C2-T1: No tenderness  Paracervical Left: No tenderness  Paracervical Right: No tenderness    Sensation  C5-T1: Intact    Motor Strength:  Shoulder Abduction(C5): Left 5/5 Right 5/5  Wrist Extension(C6): Left 5/5 Right 5/5  Wrist Flexion(C7): Left 5/5 Right 5/5  Finger (C8): Left 5/5 Right 5/5  Finger Spread(T1): Left 5/5 Right 5/5    Reflex:  Biceps(C5): +2 bilaterally  Brachioradialis(C6): +2 bilaterally  Triceps(C7): +2 bilaterally    Spurlings: Negative    Wrist: Left and Right  Wrist Effusion: None  Deformity: None    Other test:  Median Nerve Compression Test: Positive  Tinels test: Negative    Strength (0-5/5):   Flexion:5/5  Extension: 5/5  : 5/5  Intrinsics: 5/5  EPL (extensor pollicis longus): 5/5  Pinch mechanism: 5/5    Neuro/Vascular: Pulses intact, no edema, and neurologically intact. Skin: No obvious rash. Negative for thenar atrophy    [facility]  OFFICE PROCEDURE PROGRESS NOTE        Chart reviewed for the following:   Miracle LOUIS MD, have reviewed the History, Physical and updated the Allergic reactions for [address] performed immediately prior to start of procedure:   Miracle LOUIS MD, have performed the following reviews on Starr Kimble prior to the start of the procedure:            * Patient was identified by name and date of birth   * Agreement on procedure being performed was verified  * Risks and Benefits explained to the patient  * Procedure site verified and marked as necessary  * Patient was positioned for comfort  * Consent was signed and verified     Time: 2:00pm      Date of procedure: 10/12/2021    Procedure performed by:  Miracle Cruz MD    Provider assisted by: Anita Verma LPN    Patient assisted by: self    How tolerated by patient: tolerated the procedure well with no complications    Post Procedural Pain Scale: 0 - No Hurt    MSK US Guided Percutaneous Hydrodissection Adhesiolysis & Neuroplasty Procedure of the Bilateral Carpal Tunnels    After discussion of the risks and benefits, the patient elected to proceed with MSK US Guided Percutaneous Hydrodissection Adhesiolysis & Neuroplasty, and it was confirmed that the patient does not have history of prior adverse reactions, active infections, or relevant allergies. There was no erythema, or excessive warmth, and the skin was clear in all areas to be treated. The tarsal tunnel and tibial nerve was identified on US.  MSK US guided injection hydrodessection of the tibial nerve via a [id number] out-of-plane approach after Chlorhexidine prep and needle localization using a 22 ga needle, injecting 40 mg of kenalog, and 5 ml Lidocaine 1% w/o Epi and 5mL of D5 in the the left median nerve and 40 mg of kenalog, and 2 ml Lidocaine 1% w/o Epi and 2mL of D5 into the right median nerve. Pt had no unexpected motor abnormalities and was distally vascularly intact after procedure. Improved with injection        No results found for any visits on 10/12/21. Assessment:    ICD-10-CM ICD-9-CM    1. Bilateral carpal tunnel syndrome  G56.03 354.0 INJECT NERV BLCK,OTHR PERIPH NERV      triamcinolone acetonide (KENALOG-40) 40 mg/mL injection      lidocaine, PF, (XYLOCAINE) 10 mg/mL (1 %) injection      TRIAMCINOLONE ACETONIDE INJ      AMB POC US, SONO GUIDE NEEDLE   2. Sternal pain  R07.89 786.50        Plan:  -Sternal pain improved. Continue to monitor. Carpal tunnel improved with injections. Patient encounter was at > 40 minutes of total time spend on the date of the encounter: preparing to see the patient(reviewing prior notes and tests), obtaining and/or reviewing separately obtained history, performing a medially appropriate examination and/or evaluation, counseling and educating the patient before and after each procedure as she felt lightheaded after each injection, ordering medications and procedures, documenting clinical information in the electronic or other health record, independently interpreting results(not separately reported) and communicating results to the patient and care coordination(not separately reported) for follow up appointment and counseling patient on prognosis and treatment options moving forward. After Care Instructions: The patient is asked to continue to rest the joint for a few more days before resuming regular activities. It may be more painful for the first 1-2 days. Watch for fever, or increased swelling or persistent pain in the joint.  Call or return to clinic prn if such symptoms occur or there is failure to improve as anticipated. Follow-up and Dispositions    · Return in about 3 months (around 1/12/2022) for Carpel tunnel syndrome.

## 2021-11-03 ENCOUNTER — OFFICE VISIT (OUTPATIENT)
Dept: NEUROLOGY | Age: 31
End: 2021-11-03
Payer: COMMERCIAL

## 2021-11-03 DIAGNOSIS — F90.0 ATTENTION DEFICIT HYPERACTIVITY DISORDER (ADHD), INATTENTIVE TYPE, MODERATE: ICD-10-CM

## 2021-11-03 DIAGNOSIS — F43.10 PTSD (POST-TRAUMATIC STRESS DISORDER): Primary | ICD-10-CM

## 2021-11-03 DIAGNOSIS — F41.8 ANXIETY WITH SOMATIC FEATURES: ICD-10-CM

## 2021-11-03 DIAGNOSIS — F32.0 MILD MAJOR DEPRESSION (HCC): ICD-10-CM

## 2021-11-03 PROCEDURE — 96131 PSYCL TST EVAL PHYS/QHP EA: CPT | Performed by: CLINICAL NEUROPSYCHOLOGIST

## 2021-11-03 PROCEDURE — 96137 PSYCL/NRPSYC TST PHY/QHP EA: CPT | Performed by: CLINICAL NEUROPSYCHOLOGIST

## 2021-11-03 PROCEDURE — 96139 PSYCL/NRPSYC TST TECH EA: CPT | Performed by: CLINICAL NEUROPSYCHOLOGIST

## 2021-11-03 PROCEDURE — 96136 PSYCL/NRPSYC TST PHY/QHP 1ST: CPT | Performed by: CLINICAL NEUROPSYCHOLOGIST

## 2021-11-03 PROCEDURE — 96138 PSYCL/NRPSYC TECH 1ST: CPT | Performed by: CLINICAL NEUROPSYCHOLOGIST

## 2021-11-03 PROCEDURE — 96130 PSYCL TST EVAL PHYS/QHP 1ST: CPT | Performed by: CLINICAL NEUROPSYCHOLOGIST

## 2021-11-03 NOTE — LETTER
11/5/2021 Patient: Justin Moya YOB: 1990 Date of Visit: 11/3/2021 Alayna Reynolds, [address] [address] [address] [facility] [address] Dear Alayna Reynolds DO, Thank you for referring Ms. Justin Moya to [facility] for evaluation. My notes for this consultation are attached. If you have questions, please do not hesitate to call me. I look forward to following your patient along with you. Sincerely, Linda Mcginnis PsyD

## 2021-11-05 ENCOUNTER — OFFICE VISIT (OUTPATIENT)
Dept: FAMILY MEDICINE CLINIC | Age: 31
End: 2021-11-05
Payer: COMMERCIAL

## 2021-11-05 VITALS
TEMPERATURE: 98.2 F | SYSTOLIC BLOOD PRESSURE: 98 MMHG | WEIGHT: 164 LBS | RESPIRATION RATE: 16 BRPM | BODY MASS INDEX: 28.15 KG/M2 | OXYGEN SATURATION: 98 % | DIASTOLIC BLOOD PRESSURE: 62 MMHG

## 2021-11-05 DIAGNOSIS — F41.0 PANIC ATTACKS: ICD-10-CM

## 2021-11-05 DIAGNOSIS — L24.89 IRRITANT CONTACT DERMATITIS DUE TO OTHER AGENTS: Primary | ICD-10-CM

## 2021-11-05 PROCEDURE — 99214 OFFICE O/P EST MOD 30 MIN: CPT | Performed by: STUDENT IN AN ORGANIZED HEALTH CARE EDUCATION/TRAINING PROGRAM

## 2021-11-05 RX ORDER — HYDROXYZINE 25 MG/1
25 TABLET, FILM COATED ORAL
Qty: 30 TABLET | Refills: 1 | Status: SHIPPED | OUTPATIENT
Start: 2021-11-05 | End: 2021-11-15

## 2021-11-05 RX ORDER — TRIAMCINOLONE ACETONIDE 1 MG/G
OINTMENT TOPICAL 2 TIMES DAILY
Qty: 30 G | Refills: 0 | Status: SHIPPED | OUTPATIENT
Start: 2021-11-05 | End: 2021-11-19

## 2021-11-05 RX ORDER — TRIAMCINOLONE ACETONIDE 1 MG/G
OINTMENT TOPICAL 2 TIMES DAILY
Qty: 30 G | Refills: 0 | Status: SHIPPED | OUTPATIENT
Start: 2021-11-05 | End: 2021-11-05

## 2021-11-05 NOTE — PATIENT INSTRUCTIONS

## 2021-11-05 NOTE — PROGRESS NOTES
[address] [facility],5Th Floor  [address]   [place] [address] [facility]   [address]   [phone] Office   [phone] Fax      Psychological Evaluation Report      Referral:  DO Dominic Morocho is a 28 y.o. partnered female who was unaccompanied to the initial clinical interview on 8/25/21. Please refer to her medical records for details pertaining to her history. At the start of the appointment, I reviewed the patient's [facility] Epic Chart (including Media scanned in from previous providers) for the active Problem List, all pertinent Past Medical Hx, medications, recent radiologic and laboratory findings. In addition, I reviewed pt's documented Immunization Record and Encounter History. SHe works in a [employer] center. High school completed without history of previously diagnosed LD and/or receipt of special education services. As she reflects back on her life, she has realized she has always had a hard time with focus and concentration and certainly feels worse of late. Not sure if she is noticing it more. Attention all over the place. Easily distracted. Starts tasks and does not complete. Has a million things going on and not finishing them. Partner notices. Hard to stay organized. Worse after childbirth - she has a 11year old and 3year old. She has a hard time just doing one thing at a time. She is disorganized. Has to write everything down in a notebook, or it didn't happen. Even if she did write it, if she doesn't remember it's written down, it didn't happen. She took an online test which said she had ADD and should get evaluated. Spoke with PCP and therapist and all have suggested evaluation. She has been dealing with insomnia for most of her life. Appetite is fine. She has migraines. She also has anxiety. She works late. 3year old not sleeping all that well. Can't turn mind off at night.       No previous neuropsych. Neuropsychological Mental Status Exam (NMSE):    Historian: Good  Praxis: No UE apraxia  R/L Orientation: Intact to self and to other  Dress: within normal limits   Weight: within normal limits   Appearance/Hygiene: within normal limits   Gait: within normal limits   Assistive Devices: None  Mood: within normal limits   Affect: within normal limits   Comprehension: within normal limits   Thought Process: within normal limits   Expressive Language: within normal limits   Receptive Language: within normal limits   Motor:  No cognitive or motor perseveration  ETOH: 1-2 glasses of wine rarely  Tobacco: Denied  Marijuana: Denied  Illicit: Denied  SI/HI: Denied  Psychosis: Denied  Insight: Within normal limits  Judgment: Within normal limits  Other Psych:      Past Medical History:   Diagnosis Date    Asthma     Carpal tunnel syndrome     bilateral    Crohn's disease ([address]     Environmental and seasonal allergies     GERD (gastroesophageal reflux disease)     Ill-defined condition     crohn's    Migraines     Psychiatric disorder     anxiety    PUD (peptic ulcer disease)     Vertigo        Past Surgical History:   Procedure Laterality Date    COLONOSCOPY N/A 2019    COLONOSCOPY performed by Krista Landrum MD at [facility] ENDOSCOPY    HX  SECTION      HX OVARIAN CYST REMOVAL      HX TONSILLECTOMY         Allergies   Allergen Reactions    Shellfish Derived Hives     Not allergic       Family History   Problem Relation Age of Onset    Diabetes Mother     Hypertension Mother    [de-identified] Elevated Lipids Mother     Hypertension Father    [de-identified] Elevated Lipids Father        Social History     Tobacco Use    Smoking status: Former Smoker     Types: Cigarettes     Quit date: 2019     Years since quittin.5    Smokeless tobacco: Never Used   Substance Use Topics    Alcohol use: Yes     Alcohol/week: 1.0 standard drink     Types: 1 Glasses of wine per week    Drug use:  No Current Outpatient Medications   Medication Sig Dispense Refill    hydrOXYzine HCL (ATARAX) 25 mg tablet Take 1 Tablet by mouth daily as needed for Anxiety for up to 10 days. 30 Tablet 1    triamcinolone acetonide (KENALOG) 0.1 % ointment Apply  to affected area two (2) times a day for 14 days. use thin layer 30 g 0    sertraline (ZOLOFT) 50 mg tablet Take 1 Tablet by mouth daily. 90 Tablet 1    albuterol (PROVENTIL HFA, VENTOLIN HFA, PROAIR HFA) 90 mcg/actuation inhaler INHALE 2 PUFFS BY MOUTH EVERY 4 HOURS AS NEEDED FOR WHEEZE 18 Inhaler 2    lansoprazole (PREVACID) 15 mg capsule TAKE 1 CAPSULE BY MOUTH EVERY DAY           Plan:  Obtain authorization for testing from insurance company. Report to follow once testing, scoring, and interpretation completed. ? Organic based neurocognitive issues versus mood disorder or combination of same. ? Problems organic, functional, or both? This note will not be viewable in [address] Psychological Evaluation Results Follow  Patient Testing 11/3/21 Report Completed 11/5/21  A Psychometrist Assisted w/ portions of this evaluation while under my direct supervision    Neuropsychologist Administered, Interpreted, & Reported: Neuropsychological Mental Status Exam, Revised Memory & Behavior Checklist, MMSE, Clock Drawing, Test Of Premorbid Functioning, Telma Avila Adult ADD Scales, History Taking  & Clinical Interview With The Patient,  TEJAS, CPT, Austin-Melzack Pain Questionnaire, Review Of Available Records*. Psychometrist Administered & Neuropsychologist Interpreted & Neuropsychologist Reported: SRT, Paced Serial Addition Test, Wechsler Adult Intelligence Scale - IV, Verbal Fluency Tests, Luis Enrique & Luis Enrique - Revised, Trailmaking Test Parts A & B, Buschke Selective Reminding Test, Mohit Complex Figure Test, Beck Depression Inventory - II, Beck Anxiety Inventory,.       Test Findings:  Note:  The patients raw data have been compared with currently available norms which include demographic corrections for age, gender, and/or education. Sometimes, the patients scores are compared to demographically similar individuals as close to the patients age, education level, etc., as possible. \"Average\" is viewed as being +/- 1 standard deviation (SD) from the stated mean for a particular test score. \"Low average\" is viewed as being between 1 and 2 SD below the mean, and above average is viewed as being 1 and 2 SD above the mean. Scores falling in the borderline range (between 1-1/2 and 2 SD below the mean) are viewed with particular attention as to whether they are normal or abnormal neurocognitive test scores. Other methods of inference in analyzing the test data are also utilized, including the pattern and range of scores in the profile, bilateral motor functions, and the presence, if any, of pathognomonic signs. Behaviorally, the patient was friendly and cooperative and appeared motivated to perform well during this examination. Within this context, the results of this evaluation are viewed as a valid reflection of the patients actual neurocognitive and emotional status. Her structured word list fluency, as assessed by the FAS Test, was within the mildly impaired range with a T score of 38. Confrontation naming ability, as assessed by the [facility] Test - Revised, was within the below average range at 54/60 correct (T = 43). This pattern of performance is indicative of a patient who is at mildly increased risk for day-to-day problems with verbal fluency and confrontation naming was normal.       The patient's self reported score of 76 on the [facility] Adult ADD Scales was within the elevated risk range for ADD related concerns.        The patient was administered the [facility] Continuous Performance Test - III, a computer-administered test of sustained attention, and review of the subscales within this instrument revealed mild concerns for inattentiveness without additional concern for impulsivity. Nonverbal auditory attention and discrimination, as assessed by the SRT (T - 62) was above average range. High level auditory information processing speed, as assessed by the Paced Serial Addition Test, was within the normal range (- 0.08 SD) for Trial 2. This pattern of performance is indicative of a patient who is at increased risk for day-to-day problems with sustained visual attention/concentration. Auditory attention and high level  auditory information processing speed abilities were within normal limits. The patient was administered the Wechsler Adult Intelligence Scale - IV. See Appendix I for full breakdown of IQ test scores (scanned into media section of this EMR). As can be seen, there was no clinically significant difference between her average range Working Memory Index score of 105 (63rd %ile) and her average range Processing Speed Index score of 102 (55th %ile). Her Verbal Comprehension Index score of 100 (50th %ile) was within the average range. Her Perceptual Reasoning Index score of 113 (81st %ile) was high average. These scores are commensurate with what would be expected based on her performance on a test assessing estimated premorbid levels of functioning. The patient was administered the Buschke Selective Reminding Test and her basic learning and memory on this test (110/144) was within normal limits. In this regard, her efficiency related Consistent Long Term Recall score was impaired (56/144), especially when compared to her normal range Long Term Storage (103/144). Her discrepancy score (+ 47 points) on the Buschke Selective Reminding Test is clinically significant and is suggestive of a high level cognitive organization impairment and/or high level attention problem. Otherwise, her auditory memory abilities are within normal limits.        Simple timed visual motor sequencing (Trailmaking Test Part A) was within the mildly impaired range with a T score of 36. Her performance on a similar, but more complex task of timed visual motor sequencing (Trailmaking Test Part B) was within the below average range with a T score of 43. She made only one sequencing error on this latter test.   Taken together, this pattern of performance is not indicative of a patient who is at increased risk for day-to-day problems with executive functioning. The patient rated her current level of pain as \"0/5- No Pain\" on the Austin-Melzack Pain Questionnaire. She reported pain in her head, chest, and pubic region. Her Black Depression Inventory -II score of 16 was within the mildly depressed range. Her Black Anxiety Inventory score of 27 reflected severe anxiety. The patient was also administered the Personality Assessment Inventory and generated a valid profile for interpretation. Within this context, severe anxiety is present to the degree whereby her ability to focus and to attend are significantly compromised. Maladaptive behavior patterns aimed at controlling anxiety are present. There is strong support for PTSD mild depression is present. She is highly motivated for treatment. Impressions & Recommendations:  From the actual neurocognitive profile, there is strong support for a diagnosis of inattentive ADHD. She is also showing problems with high level cognitive organization related abilities. IQ is normal.  Learning and memory abilities are normal.  Executive functioning abilities are normal.  From an emotional standpoint, there is PTSD, severe anxiety with somatic features, and mild major depression. Her anxiety is significant to the degree whereby her ability to focus and to attend are further compromised. The pattern of normal versus abnormal neurocognitive test scores suggests that ADHD is a separate issue from emotional distress. The former is organic and the latter a combination of organic and functional factors.   Of course, I am more concerned about her mood and I am the ADHD-inattentive, but to some degree these may be cyclically exacerbating problems. In addition next treatment for PTSD-including counseling, consideration for EMDR, and psychiatric treatment for anxiety and depression, my recommendations include consideration for a 30-day trial of an appropriate attention related medication, if this is not medically contraindicated. Caution is advised in selecting same, given the anxiety. During this trial, the patient should keep track of her response to this medication and provide the prescribing physician with feedback at the end of the month regarding its efficacy. Organizational skills development may prove helpful as well. . Baseline now established. Follow up prn. Clinical correlation is, of course, indicated. I will discuss these findings with the patient when she follows up with me in the near future. A follow up Neuropsychological Evaluation is indicated on a prn basis, especially if there are any cognitive and/or emotional changes. DIAGNOSES: PTSD    Severe Anxiety with Somatic Features    ADHD, Inattentive, Moderate    Mild Major Depression     The above information is based upon information currently available to me. If there is any additional information of which I am currently unaware, I would be more than happy to review it upon having it made available to me. Thank you for the opportunity to see this interesting individual.     Sincerely,       Stephane Galeas.  Cris Vega, Wilman    Cc: Craly Sprague,      Time Documentation:      55991 x 1 89593*6 Test administration/data gathering by Neuropsychologist (see above), 60 minutes  96138 x 1 Test administration, data gathering by technician (1st 30 minutes), 30 minutes  96139 x 3 Test administration, data gathering by technician (each additional 30 minutes),  hours (total tech 2 hours)    x 1 Testing Evaluation Services By Neuropsychologist, 1st hour  60370 x 1 Testing Evaluation Services by Neuropsychologist, 2nd hour (45 minutes)  This includes review of referral question, reviewing records, planning test battery (50 minutes prior to testing date), and interpreting data (30 minutes), and interpretation and report writing (50 minutes)       Anticipated Integrated Feedback (99839) - Service to be completed on a future date and not currently billed. The above includes: Record review. Review of history provided by patient. Review of collaborative information. Testing by Clinician. Review of raw data. Scoring. Report writing of individual tests administered by Clinician. Integration of individual tests administered by psychometrist with NSE/testing by clinician, review of records/history/collaborative information, case Conceptualization, treatment planning, clinical decision making, report writing, coordination Of Care. Psychometry test codes as time spent by psychometrist administering and scoring neurocognitive/psychological tests under supervision of neuropsychologist.  Integral services including scoring of raw data, data interpretation, case conceptualization, report writing etcetera were initiated after the patient finished testing/raw data collected and was completed on the date the report was signed.

## 2021-11-05 NOTE — PROGRESS NOTES
[address] [facility] [address] [place] [address]   Office [phone], Fax [phone]    Subjective:     Chief Complaint   Patient presents with    Rash     History provided by patient     Rachna Trejo is a 28 y.o. female presents for inner groin skin rash that has been present for 10-14 days. 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 She believes the scrubs caused a reaction. The rash is itchy but not painful. She has not used any new soaps, detergents, lotions, scrubs. She has been using OTC hydrocortisone cream and PO and topical benadryl. She believes the topical steroids have started to help a little bit. Has also started to wear leggings under her scrubs to prevent rubbing. Denies rash of other areas. She also has a history of panic attacks, that are very infrequent and says she was prescribed xanax over 2 years ago and was wondering if she could have refills. She is compliant with her Zoloft. Medication reviewed. Current Outpatient Medications:     hydrOXYzine HCL (ATARAX) 25 mg tablet, Take 1 Tablet by mouth daily as needed for Anxiety for up to 10 days. , Disp: 30 Tablet, Rfl: 1    triamcinolone acetonide (KENALOG) 0.1 % ointment, Apply  to affected area two (2) times a day for 14 days. use thin layer, Disp: 30 g, Rfl: 0    methylPREDNISolone (MEDROL DOSEPACK) 4 mg tablet, , Disp: , Rfl:     sertraline (ZOLOFT) 50 mg tablet, Take 1 Tablet by mouth daily. , Disp: 90 Tablet, Rfl: 1    fluticasone propionate (FLONASE) 50 mcg/actuation nasal spray, 2 Sprays by Both Nostrils route daily.  (Patient not taking: Reported on 9/28/2021), Disp: 1 Bottle, Rfl: 5    albuterol (PROVENTIL HFA, VENTOLIN HFA, PROAIR HFA) 90 mcg/actuation inhaler, INHALE 2 PUFFS BY MOUTH EVERY 4 HOURS AS NEEDED FOR WHEEZE, Disp: 18 Inhaler, Rfl: 2    lansoprazole (PREVACID) 15 mg capsule, TAKE 1 CAPSULE BY MOUTH EVERY DAY, Disp: , Rfl:      Allergy reviewed. Allergies   Allergen Reactions    Shellfish Derived Hives     Not allergic        Past Medical History:   Diagnosis Date    Asthma     Carpal tunnel syndrome     bilateral    Crohn's disease ([address]     Environmental and seasonal allergies     GERD (gastroesophageal reflux disease)     Ill-defined condition     crohn's    Migraines     Psychiatric disorder     anxiety    PUD (peptic ulcer disease)     Vertigo        Social History     Socioeconomic History    Marital status: SINGLE   Tobacco Use    Smoking status: Former Smoker     Types: Cigarettes     Quit date: 2019     Years since quittin.5    Smokeless tobacco: Never Used   Substance and Sexual Activity    Alcohol use: Yes     Alcohol/week: 1.0 standard drink     Types: 1 Glasses of wine per week    Drug use: No    Sexual activity: Yes     Partners: Male       Review of Systems   Constitutional: Negative for chills and fever. HENT: Negative for congestion and sore throat. Respiratory: Negative for cough and shortness of breath. Cardiovascular: Negative for chest pain and palpitations. Gastrointestinal: Negative for abdominal pain, diarrhea, nausea and vomiting. Skin: Positive for itching and rash. Neurological: Negative for dizziness and headaches. Psychiatric/Behavioral: Negative for depression. The patient is nervous/anxious. Objective:   Vitals - reviewed  Visit Vitals  BP 98/62 (BP 1 Location: Left upper arm, BP Patient Position: Sitting, BP Cuff Size: Adult)   Temp 98.2 °F (36.8 °C) (Oral)   Resp 16   Wt 164 lb (74.4 kg)   LMP 10/06/2021 (Approximate)   SpO2 98%   BMI 28.15 kg/m²        Physical exam:   GEN: NAD. Alert. Well nourished. EYES:  Conjunctiva clear  NECK:  Supple; no masses; thyroid normal           LUNGS: Respirations unlabored; CTAB.  no wheeze, rales, rhonchi   CARDIOVASCULAR: Regular, rate, and rhythm without murmurs, gallops or rubs   ABDOMEN: Soft; NT, ND. +bowel sounds; no rebound tenderness, no guarding. no masses or organomegaly  NEUROLOGIC:  No focal neurologic deficits. Strength and sensation grossly intact. MSK: FROM in all extremities (both passive and active). Good tone. No vertebral tenderness. EXT: Well perfused. No edema. No erythema. PSYCH: appropriate mood and affect. Good insight and judgement. Cooperative. SKIN: Maculopapular rash on inner groin b/l (8 inches long x 4 inches wide)      Assessment and orders:       ICD-10-CM ICD-9-CM    1. Irritant contact dermatitis due to other agents  L24.89 692.89 triamcinolone acetonide (KENALOG) 0.1 % ointment      DISCONTINUED: triamcinolone acetonide (KENALOG) 0.1 % ointment   2. Panic attacks  F41.0 300.01 hydrOXYzine HCL (ATARAX) 25 mg tablet     Contact dermatitis  - Rx strength topical steroid twice daily for 12 weeks  - Fragrance free soaps/lotions   - Emollients/moisturizers to help decrease irritation/restore barrier  - Continue to wear barrier protection under scrubs, like spandex/leggings if possibble     Panic attacks  - Continue zoloft  - Atarax prn. Discussed xanax can be habit forming and she would like to avoid any habit forming medication moving forward      Follow up in 2 weeks if rash not improving. Is also following up next week to discuss ADD testing results from Dr. Alecia Harrison     Pt was discussed with Dr Anahy Jamil (attending physician). I have reviewed patient medical and social history and medications. I have reviewed pertinent labs results and other data. I have discussed the diagnosis with the patient and the intended plan as seen in the above orders. The patient has received an after-visit summary and questions were answered concerning future plans. I have discussed medication side effects and warnings with the patient as well.     Radha Thomas DO  Resident [facility]  11/05/21

## 2021-11-08 ENCOUNTER — VIRTUAL VISIT (OUTPATIENT)
Dept: FAMILY MEDICINE CLINIC | Age: 31
End: 2021-11-08
Payer: COMMERCIAL

## 2021-11-08 ENCOUNTER — TELEPHONE (OUTPATIENT)
Dept: FAMILY MEDICINE CLINIC | Age: 31
End: 2021-11-08

## 2021-11-08 DIAGNOSIS — R41.840 ATTENTION DEFICIT: ICD-10-CM

## 2021-11-08 DIAGNOSIS — F32.A ANXIETY AND DEPRESSION: Primary | ICD-10-CM

## 2021-11-08 DIAGNOSIS — F43.10 PTSD (POST-TRAUMATIC STRESS DISORDER): ICD-10-CM

## 2021-11-08 DIAGNOSIS — F41.9 ANXIETY AND DEPRESSION: Primary | ICD-10-CM

## 2021-11-08 PROCEDURE — 99214 OFFICE O/P EST MOD 30 MIN: CPT | Performed by: STUDENT IN AN ORGANIZED HEALTH CARE EDUCATION/TRAINING PROGRAM

## 2021-11-08 RX ORDER — EPINEPHRINE 0.3 MG/.3ML
INJECTION SUBCUTANEOUS
COMMUNITY
Start: 2021-10-11

## 2021-11-08 RX ORDER — PROCHLORPERAZINE MALEATE 5 MG
TABLET ORAL
COMMUNITY
Start: 2021-11-02 | End: 2022-04-28

## 2021-11-08 NOTE — PROGRESS NOTES
Álvaro Perdomo  28 y.o. female  1990  [address]  [id number]   [address]:    Telemedicine Progress Note  Shirin Al MD       Encounter Date and Time: November 8, 2021 at 10:13 AM    Consent:  She and/or the health care decision maker is aware that that she may receive a bill for this telephone service, depending on her insurance coverage, and has provided verbal consent to proceed: Yes    Chief Complaint   Patient presents with    Anxiety    Depression     History of Present Illness   Álvaro Perdomo is a 28 y.o. female was evaluated by synchronous (real-time) audio-video technology from home, through [employer]. Anxiety/Depression w/ concern for ADD:  See's therapist Dr. Angel Pang with [facility] but has not done any CBT with her. Reports PTSD from [address] [place] [address] and spinal block from first pregnancy, which dropped her blood pressure very low which in return has given her anxiety about having another child. However, she has had another child since. 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       Past Medical History:   Diagnosis Date    Asthma     Carpal tunnel syndrome     bilateral    Crohn's disease ([address]     Environmental and seasonal allergies     GERD (gastroesophageal reflux disease)     Ill-defined condition     crohn's    Migraines     Psychiatric disorder     anxiety    PUD (peptic ulcer disease)     Vertigo      Past Surgical History:   Procedure Laterality Date    COLONOSCOPY N/A 5/1/2019    COLONOSCOPY performed by Darcy Garcia MD at [address] [place] [address]  HX OVARIAN CYST REMOVAL      HX TONSILLECTOMY       Family History   Problem Relation Age of Onset    Diabetes Mother     Hypertension Mother     Elevated Lipids Mother     Hypertension Father    Garcia Elevated Lipids Father      Social History     Tobacco Use    Smoking status: Former Smoker     Types: Cigarettes     Quit date: 2019     Years since quittin.5    Smokeless tobacco: Never Used   Substance Use Topics    Alcohol use: Yes     Alcohol/week: 1.0 standard drink     Types: 1 Glasses of wine per week    Drug use: No      Patient Active Problem List   Diagnosis Code    Environmental and seasonal allergies J30.89    Crohn's disease ([facility] K50.90    Asthma J45.909    GERD (gastroesophageal reflux disease) K21.9    Migraines G43.909    Vertigo R42    Anxiety and depression F41.9, F32. A    Attention deficit R41.840    PTSD (post-traumatic stress disorder) F43.10       Current Medications/Allergies   Medications and Allergies reviewed:    Current Outpatient Medications   Medication Sig Dispense Refill    EPINEPHrine (EPIPEN) 0.3 mg/0.3 mL injection       prochlorperazine (COMPAZINE) 5 mg tablet       hydrOXYzine HCL (ATARAX) 25 mg tablet Take 1 Tablet by mouth daily as needed for Anxiety for up to 10 days. 30 Tablet 1    triamcinolone acetonide (KENALOG) 0.1 % ointment Apply  to affected area two (2) times a day for 14 days. use thin layer 30 g 0    sertraline (ZOLOFT) 50 mg tablet Take 1 Tablet by mouth daily.  90 Tablet 1    albuterol (PROVENTIL HFA, VENTOLIN HFA, PROAIR HFA) 90 mcg/actuation inhaler INHALE 2 PUFFS BY MOUTH EVERY 4 HOURS AS NEEDED FOR WHEEZE 18 Inhaler 2    lansoprazole (PREVACID) 15 mg capsule TAKE 1 CAPSULE BY MOUTH EVERY DAY       Allergies   Allergen Reactions    Shellfish Derived Hives     Not allergic    Tape [Adhesive] Contact Dermatitis, Itching, Rash and Swelling       Review of Systems   Review of Systems   Constitutional: Negative for chills, fever and malaise/fatigue. Eyes: Negative for blurred vision and double vision. Respiratory: Negative for shortness of breath. Cardiovascular: Negative for chest pain. Gastrointestinal: Negative for abdominal pain, constipation, diarrhea, nausea and vomiting. Neurological: Negative for dizziness and headaches. Psychiatric/Behavioral: Positive for depression. Negative for hallucinations, substance abuse and suicidal ideas. The patient is nervous/anxious. Vitals/Objective:     General: alert, cooperative, no distress. Laughing at times appropriately during conversation. Mental  status: mental status: alert, oriented to person, place, and time, normal mood, behavior, speech, dress, motor activity, and thought processes   Resp: resp: normal effort and no respiratory distress   Neuro: neuro: no gross deficits   Skin: skin: no discoloration or lesions of concern on visible areas   Due to this being a TeleHealth evaluation, many elements of the physical examination are unable to be assessed. Assessment and Plan:   I have reviewed the following:  - Encounter Notes from 2021, Labs from [id number]. Diagnoses and all orders for this visit:    1. Anxiety and depression  Assessment & Plan:  No SI/HI  Crisis hotline and psych resources given  Patient managing a lot at home with two small children  Overall she reports doing well with Zoloft  Unable to assess with PHQ9 and GAD7 during virtual visit as pt was managing two small children at the same time  Recommend follow up in a quieter environment to explore further and assess appropriately  Would recommend increasing Zoloft as needed  Pt would like to establish care with Psychiatrist  I think this would be very appropriate given both PTSD and possible ADHD component    Orders:  -     REFERRAL TO PSYCHOLOGY  -     REFERRAL TO PSYCHIATRY    2.  Attention deficit  Assessment & Plan:  Diagnosis 11/3/21, per Dr. Friend [place]  Difficult to say how much her PTSD and anxiety/depression as well as current situation managing two young children play into this. Would recommend obtaining good control over anxiety/depression as well as PTSD component prior to initiating treatment for ADHD  Given anxiety/depression component, would suggest if deciding to treat, to use a non-stimulatory medication or trial of Wellbutrin instead of Zoloft  Further discussion of sleep hygiene may also help in improving her attention deficit  I feel CBT may be the best place to start  Patient interested in establishing care with Psychology for CBT    Orders:  -     REFERRAL TO PSYCHOLOGY  -     REFERRAL TO PSYCHIATRY    3. PTSD (post-traumatic stress disorder)  Assessment & Plan:  Has therapist whom she talks to but reports no CBT   Will refer to Psychology for CBT    Orders:  -     REFERRAL TO PSYCHOLOGY       Patient informed to follow up: Follow-up and Dispositions  ·   Return if symptoms worsen or fail to improve. We discussed the expected course, resolution and complications of the diagnosis(es) in detail. Medication risks, benefits, costs, interactions, and alternatives were discussed as indicated. I advised her to contact the office if her condition worsens, changes or fails to improve as anticipated. She expressed understanding with the diagnosis(es) and plan. Patient understands that this encounter was a temporary measure, and the importance of further follow up and examination was emphasized. Patient verbalized understanding.        Electronically Signed: Francine Cabello MD    Providers location when delivering service: Home      Pursuant to the emergency declaration under the [facility] waiver authority and the [employer] and [employer] Act, this Virtual  Visit was conducted, with patient's consent, to reduce the patient's risk of exposure to COVID-19 and provide continuity of care for an established patient. Services were provided through a video synchronous discussion virtually to substitute for in-person clinic visit.

## 2021-11-08 NOTE — TELEPHONE ENCOUNTER
Called pt twice this morning to get her checked in for her VV appt with dr. Talisha Garza. Left a voicemail.

## 2021-11-08 NOTE — PATIENT INSTRUCTIONS
[address]  [phone]      [facility] Board:  24 hour crisis line: [phone]  Daytime number: [phone]  Psychiatry, counseling, case management, drug/alcohol addiction     [address] (Dr. Jamey Oconnor): [email or web address]/  [address] [facility]. [address], [address]  Phone: [phone] ([phone])  Fax: [phone]  Office Hours:  Mon: 10:00 am to 4:00 pm  Tue: 8:00 am to 6:00 pm  Wed-Thur: 8:00 am to 7:00 pm     [facility]: [email or web address]/  [place] ([phone]),  [place] ([phone])  [place] ([phone])  [address] ([phone])     [address] [facility]  Addiction/Substance abuse   [place]: [phone]  [place]: [address] [place] [address] [facility]: [employer]  [address] , [address] [place] [address]  Ph. [phone] Fax [phone]     Walter Uriostegui: [email or web address]  [employer] ([phone])  Gopal Woodard ([phone])

## 2021-11-08 NOTE — PROGRESS NOTES
[facility] Residency Attending Addendum:  Dr. Erich Vega MD,  the patient and I were not physically present during this encounter. The resident and I are concurrently monitoring the patient care through appropriate telecommunication technology. I discussed the findings, assessment and plan with the resident and agree with the resident's findings and plan as documented in the resident's note.       Miguel Whitmore MD

## 2021-11-08 NOTE — ASSESSMENT & PLAN NOTE
Diagnosis 11/3/21, per Dr. Kevin Will  Difficult to say how much her PTSD and anxiety/depression as well as current situation managing two young children play into this.   Would recommend obtaining good control over anxiety/depression as well as PTSD component prior to initiating treatment for ADHD  Given anxiety/depression component, would suggest if deciding to treat, to use a non-stimulatory medication or trial of Wellbutrin instead of Zoloft  Further discussion of sleep hygiene may also help in improving her attention deficit  I feel CBT may be the best place to start  Patient interested in establishing care with Psychology for CBT

## 2021-11-08 NOTE — ASSESSMENT & PLAN NOTE
No SI/HI  Crisis hotline and psych resources given  Patient managing a lot at home with two small children  Overall she reports doing well with Zoloft  Unable to assess with PHQ9 and GAD7 during virtual visit as pt was managing two small children at the same time  Recommend follow up in a quieter environment to explore further and assess appropriately  Would recommend increasing Zoloft as needed  Pt would like to establish care with Psychiatrist  I think this would be very appropriate given both PTSD and possible ADHD component

## 2021-12-12 RX ORDER — ALBUTEROL SULFATE 90 UG/1
AEROSOL, METERED RESPIRATORY (INHALATION)
Qty: 18 EACH | Refills: 2 | OUTPATIENT
Start: 2021-12-12

## 2021-12-13 ENCOUNTER — PATIENT MESSAGE (OUTPATIENT)
Dept: FAMILY MEDICINE CLINIC | Age: 31
End: 2021-12-13

## 2021-12-13 DIAGNOSIS — J45.40 MODERATE PERSISTENT ASTHMA WITHOUT COMPLICATION: Primary | ICD-10-CM

## 2021-12-13 RX ORDER — ALBUTEROL SULFATE 90 UG/1
AEROSOL, METERED RESPIRATORY (INHALATION)
Qty: 1 EACH | Refills: 2 | Status: SHIPPED | OUTPATIENT
Start: 2021-12-13 | End: 2022-05-01

## 2021-12-14 NOTE — TELEPHONE ENCOUNTER
From: Garrett Maher  To: [facility]  Sent: 12/13/2021 8:45 AM EST  Subject: Medication renewal    Hi would someone please send in a renewal prescription for my inhaler as well as citerizine.   Thank you

## 2021-12-14 NOTE — TELEPHONE ENCOUNTER
Patient with known history of asthma per problem list requesting refill. Will order refill and send to pharmacy on file.

## 2022-01-17 ENCOUNTER — VIRTUAL VISIT (OUTPATIENT)
Dept: FAMILY MEDICINE CLINIC | Age: 32
End: 2022-01-17
Payer: COMMERCIAL

## 2022-01-17 DIAGNOSIS — E55.9 VITAMIN D INSUFFICIENCY: Primary | ICD-10-CM

## 2022-01-17 DIAGNOSIS — L65.9 HAIR LOSS: ICD-10-CM

## 2022-01-17 PROCEDURE — 99214 OFFICE O/P EST MOD 30 MIN: CPT | Performed by: STUDENT IN AN ORGANIZED HEALTH CARE EDUCATION/TRAINING PROGRAM

## 2022-01-17 RX ORDER — KETOCONAZOLE 20 MG/ML
SHAMPOO TOPICAL
Qty: 120 ML | Refills: 1 | Status: SHIPPED | OUTPATIENT
Start: 2022-01-17 | End: 2022-05-08 | Stop reason: SDUPTHER

## 2022-01-17 RX ORDER — MINOXIDIL 50 MG/G
AEROSOL, FOAM TOPICAL
Qty: 60 G | Refills: 1 | Status: SHIPPED | OUTPATIENT
Start: 2022-01-17 | End: 2022-04-28

## 2022-01-17 NOTE — PROGRESS NOTES
[address] Medicine Residency Attending Addendum:  Dr. Eliezer Pineda, DO,  the patient and I were not physically present during this encounter. The resident and I are concurrently monitoring the patient care through appropriate telecommunication technology. I discussed the findings, assessment and plan with the resident and agree with the resident's findings and plan as documented in the resident's note.       Rose Dominguez MD

## 2022-01-17 NOTE — PROGRESS NOTES
Alecia Larson  33 y.o. female  1990  [address] [facility] [address]  [id number]   [address]:    Telemedicine Progress Note  Pattie Arambula DO       Encounter Date and Time: January 17, 2022 at 2:10 PM    Consent: Alecia Larson, who was seen by synchronous (real-time) audio-video technology, and/or her healthcare decision maker, is aware that this patient-initiated, Telehealth encounter on 1/17/2022 is a billable service, with coverage as determined by her insurance carrier. She is aware that she may receive a bill and has provided verbal consent to proceed: Yes. Chief Complaint   Patient presents with    Vitamin D Deficiency     History of Present Illness   Alecia Larson is a 33 y.o. female was evaluated by synchronous (real-time) audio-video technology from home, through a secure patient portal.    Low Vitamin D: Patient has labwork done with her psychiatrist and results showed low vitamin D levels. Reports a level of 14. Patient states labs were requested to be sent to our office. Hair Loss: Hair loss and thinning has been gradual after her pregnancy over a year ago. States it's mostly in the front, but is becoming diffuse. Has been using special shampoo and taking biotin. Also reports scalp itchiness and dandruff/flaking. Review of Systems   Review of Systems   Skin: Positive for itching (scalp). Hair loss       Vitals/Objective:     General: alert, cooperative, no distress   Mental  status: mental status: alert, oriented to person, place, and time, normal mood, behavior, speech, dress, motor activity, and thought processes   Resp: resp: normal effort and no respiratory distress   Neuro: neuro: no gross deficits   Skin: skin: no discoloration or lesions of concern on visible areas. Physical exam limited by video quality, but able to visualize hair loss/thinning especially in the frontal area with flaking of the scalp.     Due to this being a TeleHealth evaluation, many elements of the physical examination are unable to be assessed. Assessment and Plan:   33 y.o. female presents for vit D deficiency and hair loss. 1. Vitamin D insufficiency: per patient, vit D level 14 done with psychiatrist.   - Start Vitamin D 800-1000IU daily  - Recheck vitamin D levels in 3 months    2. 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 - Start ketoconazole shampoo for dandruff  - Trial of topical minoxidil       Time spent in direct conversation with the patient to include medical condition(s) discussed, assessment and treatment plan:       We discussed the expected course, resolution and complications of the diagnosis(es) in detail. Medication risks, benefits, costs, interactions, and alternatives were discussed as indicated. I advised her to contact the office if her condition worsens, changes or fails to improve as anticipated. She expressed understanding with the diagnosis(es) and plan. Patient understands that this encounter was a temporary measure, and the importance of further follow up and examination was emphasized. Patient verbalized understanding. Patient informed to follow up: Joaquín Orellana Follow-up and Dispositions  ·   Return in about 3 months (around 4/17/2022) for Vitamin D level check. Electronically Signed: Jeanmarie Juarez DO    CPT Codes 61285-22565 for Established Patients may apply to this Telehealth Visit. POS code: 18Irish Callahan is a 33 y.o. female who was evaluated by an audio-video encounter for concerns as above. Patient identification was verified prior to start of the visit. A caregiver was present when appropriate. Due to this being a TeleHealth encounter (During [facility] public health emergency), evaluation of the following organ systems was limited: Vitals/Constitutional/EENT/Resp/CV/GI//MS/Neuro/Skin/Heme-Lymph-Imm.   Pursuant to the emergency declaration under the [facility], [facility] waiver authority and the [employer] Act, this Virtual Visit was conducted, with patient's (and/or legal guardian's) consent, to reduce the patient's risk of exposure to COVID-19 and provide necessary medical care. Services were provided through a synchronous discussion virtually to substitute for in-person clinic visit. I was at home. The patient was at home. History   Patients past medical, surgical and family histories were reviewed and updated. Past Medical History:   Diagnosis Date    Asthma     Carpal tunnel syndrome     bilateral    [address]'s disease ([facility]     Environmental and seasonal allergies     GERD (gastroesophageal reflux disease)     Ill-defined condition     crohn's    Migraines     Psychiatric disorder     anxiety    PUD (peptic ulcer disease)     Vertigo      Past Surgical History:   Procedure Laterality Date    COLONOSCOPY N/A 2019    [facility]    HX  SECTION      HX OVARIAN CYST REMOVAL      HX TONSILLECTOMY       Family History   Problem Relation Age of Onset    Diabetes Mother     Hypertension Mother    Farris Holiness Elevated Lipids Mother     Hypertension Father    Farris Holiness Elevated Lipids Father      Social History     Tobacco Use    Smoking status: Former Smoker     Types: Cigarettes     Quit date: 2019     Years since quittin.7    Smokeless tobacco: Never Used   Substance Use Topics    Alcohol use: Yes     Alcohol/week: 1.0 standard drink     Types: 1 Glasses of wine per week    Drug use: No     Patient Active Problem List   Diagnosis Code    Environmental and seasonal allergies J30.89    Crohn's disease ([facility] K50.90    Asthma J45.909    GERD (gastroesophageal reflux disease) K21.9    Migraines G43.909    Vertigo R42    Anxiety and depression F41.9, F32. A    Attention deficit R41.840    PTSD (post-traumatic stress disorder) F43.10          Current Medications/Allergies   Medications and Allergies reviewed:    Current Outpatient Medications   Medication Sig Dispense Refill    Minoxidil 5 % foam Apply 1/2 capful once daily. 60 g 1    ketoconazole (NIZORAL) 2 % shampoo Apply 5 to 10 mL to wet scalp, lather, leave on 3 to 5 minutes, and rinse; apply twice weekly for 2 to 4 weeks. then apply only as needed to control dandruff 120 mL 1    albuterol (PROVENTIL HFA, VENTOLIN HFA, PROAIR HFA) 90 mcg/actuation inhaler INHALE 2 PUFFS BY MOUTH EVERY 4 HOURS AS NEEDED FOR WHEEZE 1 Each 2    EPINEPHrine (EPIPEN) 0.3 mg/0.3 mL injection       prochlorperazine (COMPAZINE) 5 mg tablet       sertraline (ZOLOFT) 50 mg tablet Take 1 Tablet by mouth daily.  90 Tablet 1    lansoprazole (PREVACID) 15 mg capsule TAKE 1 CAPSULE BY MOUTH EVERY DAY       Allergies   Allergen Reactions    Shellfish Derived Hives     Not allergic    Tape [Adhesive] Contact Dermatitis, Itching, Rash and Swelling

## 2022-01-21 ENCOUNTER — PATIENT MESSAGE (OUTPATIENT)
Dept: FAMILY MEDICINE CLINIC | Age: 32
End: 2022-01-21

## 2022-01-27 ENCOUNTER — VIRTUAL VISIT (OUTPATIENT)
Dept: FAMILY MEDICINE CLINIC | Age: 32
End: 2022-01-27
Payer: COMMERCIAL

## 2022-01-27 DIAGNOSIS — G56.23 CUBITAL TUNNEL SYNDROME, BILATERAL: ICD-10-CM

## 2022-01-27 DIAGNOSIS — M54.2 NECK PAIN: ICD-10-CM

## 2022-01-27 DIAGNOSIS — K50.90 CROHN'S DISEASE WITHOUT COMPLICATION, UNSPECIFIED GASTROINTESTINAL TRACT LOCATION (HCC): ICD-10-CM

## 2022-01-27 DIAGNOSIS — G62.9 NEUROPATHY: Primary | ICD-10-CM

## 2022-01-27 PROCEDURE — 99214 OFFICE O/P EST MOD 30 MIN: CPT | Performed by: FAMILY MEDICINE

## 2022-01-27 NOTE — PROGRESS NOTES
Miguel Yoo  33 y.o. female  1990  [address]  [id number]   Marques Brady MD       Encounter Date and Time: January 27, 2022 at 1:04 PM       Miguel Yoo, who was evaluated through a synchronous (real-time) audio-video encounter, and/or her healthcare decision maker, is aware that it is a billable service, which includes applicable co-pays, with coverage as determined by her insurance carrier. She provided verbal consent to proceed and patient identification was verified. This visit was conducted pursuant to the emergency declaration under the [facility], [address] [place] [address] authority and the [employer] and [employer] Act. A caregiver was present when appropriate. Ability to conduct physical exam was limited. The patient was located at home in a state where the provider was licensed to provide care. --Marques Brady MD on 1/27/2022 at 1:04 PM           No chief complaint on file. History of Present Illness   Miguel Yoo is a 33 y.o. female was evaluated by synchronous (real-time) audio-video technology from home, through a secure patient portal.    Miguel Yoo is a 33 y.o. female complains of pain in the bilateral finger pain and numbness. Denies any new rashes. Symptoms now are more in her pinky. Bilateral hands. Sometimes will still have symptoms in her thumb. Has neck and shoulder pain. Thinks this is from her picking up her children.       Review of Systems   ROS    Vitals/Objective:     General: alert, cooperative, no distress   Mental  status: mental status: alert, oriented to person, place, and time, normal mood, behavior, speech, dress, motor activity, and thought processes   Resp: resp: normal effort and no respiratory distress   Neuro: neuro: no gross deficits   Skin: skin: no discoloration or lesions of concern on visible areas   Due to this being a TeleHealth evaluation, many elements of the physical examination are unable to be assessed. Assessment and Plan:       1. Neuropathy  - METABOLIC PANEL, COMPREHENSIVE; Future  - TSH 3RD GENERATION; Future  - VITAMIN B12; Future  - CBC WITH AUTOMATED DIFF; Future    2. Crohn's disease without complication, unspecified gastrointestinal tract location ([facility] [address]  - METABOLIC PANEL, COMPREHENSIVE; Future  - TSH 3RD GENERATION; Future  - VITAMIN B12; Future  - CBC WITH AUTOMATED DIFF; Future    3. Neck pain  - XR SPINE CERV 4 OR 5 V; Future    4. Cubital tunnel syndrome, bilateral  - XR SPINE CERV 4 OR 5 V; Future      Check labs as now symptoms have moved to pinky and back to thumb regions as well. Has CT tomorrow for Crohns. Considering surgery for carpel tunnel. Discussed Dr. Usman Collins and will refer if work up negative. Time spent in direct conversation with the patient to include medical condition(s) discussed, assessment and treatment plan:    Patient encounter was >30 minutes was spent of total time spent on the date of the encounter: preparing to see the patient(reviewing prior notes and tests), obtaining and/or reviewing separately obtained history, performing a medially appropriate examination and/or evaluation, counseling and educating the patient, ordering tests, documenting clinical information in the electronic or other health record. We discussed the expected course, resolution and complications of the diagnosis(es) in detail. Medication risks, benefits, costs, interactions, and alternatives were discussed as indicated. I advised her to contact the office if her condition worsens, changes or fails to improve as anticipated. She expressed understanding with the diagnosis(es) and plan. Patient understands that this encounter was a temporary measure, and the importance of further follow up and examination was emphasized. Patient verbalized understanding. Patient informed to follow up:    Follow-up and Dispositions  ·   Return in about 1 month (around 2022) for Neuropathy if needed. Electronically Signed: Teresa Villalobos MD    CPT Codes 19885-26082 for Established Patients may apply to this Telehealth Visit. POS code: 18Irish Brown is a 33 y.o. female who was evaluated by an audio-video encounter for concerns as above. Patient identification was verified prior to start of the visit. A caregiver was present when appropriate. Due to this being a TeleHealth encounter ([facility] [place] [address] [facility]), evaluation of the following organ systems was limited: Vitals/Constitutional/EENT/Resp/CV/GI//MS/Neuro/Skin/Heme-Lymph-Imm. Pursuant to the emergency declaration under the [address] [place] [address], [facility] waiver authority and the [employer] and [employer] Act, this Virtual Visit was conducted, with patient's (and/or legal guardian's) consent, to reduce the patient's risk of exposure to COVID-19 and provide necessary medical care. Services were provided through a synchronous discussion virtually to substitute for in-person clinic visit. I was at home. The patient was at home. History   Patients past medical, surgical and family histories were reviewed and updated.       Past Medical History:   Diagnosis Date    Asthma     Carpal tunnel syndrome     bilateral    Cro[facility]'s disease ([facility] [address]     Environmental and seasonal allergies     GERD (gastroesophageal reflux disease)     Ill-defined condition     crohn's    Migraines     Psychiatric disorder     anxiety    PUD (peptic ulcer disease)     Vertigo      Past Surgical History:   Procedure Laterality Date    COLONOSCOPY N/A 2019    COLONOS[facility] by Eze [facility]ap MD at [facility]    HX  SECTION      HX OVARIAN CYST REMOVAL      HX TONSILLECTOMY       Family History   Problem Relation Age of Onset    Diabetes Mother     Hypertension Mother    [facility] Elevated Lipids Mother     Hypertension Father     Elevated Lipids Father      Social History     Tobacco Use    Smoking status: Former Smoker     Types: Cigarettes     Quit date: 2019     Years since quittin.7    Smokeless tobacco: Never Used   Substance Use Topics    Alcohol use: Yes     Alcohol/week: 1.0 standard drink     Types: 1 Glasses of wine per week    Drug use: No     Patient Active Problem List   Diagnosis Code    Environmental and seasonal allergies J30.89    Crohn's disease ([address] K50.90    Asthma J45.909    GERD (gastroesophageal reflux disease) K21.9    Migraines G43.909    Vertigo R42    Anxiety and depression F41.9, F32. A    Attention deficit R41.840    PTSD (post-traumatic stress disorder) F43.10          Current Medications/Allergies   Medications and Allergies reviewed:    Current Outpatient Medications   Medication Sig Dispense Refill    Minoxidil 5 % foam Apply 1/2 capful once daily. 60 g 1    ketoconazole (NIZORAL) 2 % shampoo Apply 5 to 10 mL to wet scalp, lather, leave on 3 to 5 minutes, and rinse; apply twice weekly for 2 to 4 weeks. then apply only as needed to control dandruff 120 mL 1    albuterol (PROVENTIL HFA, VENTOLIN HFA, PROAIR HFA) 90 mcg/actuation inhaler INHALE 2 PUFFS BY MOUTH EVERY 4 HOURS AS NEEDED FOR WHEEZE 1 Each 2    EPINEPHrine (EPIPEN) 0.3 mg/0.3 mL injection       prochlorperazine (COMPAZINE) 5 mg tablet       sertraline (ZOLOFT) 50 mg tablet Take 1 Tablet by mouth daily.  90 Tablet 1    lansoprazole (PREVACID) 15 mg capsule TAKE 1 CAPSULE BY MOUTH EVERY DAY       Allergies   Allergen Reactions    Shellfish Derived Hives     Not allergic    Tape [Adhesive] Contact Dermatitis, Itching, Rash and Swelling

## 2022-01-31 ENCOUNTER — LAB ONLY (OUTPATIENT)
Dept: FAMILY MEDICINE CLINIC | Age: 32
End: 2022-01-31

## 2022-01-31 DIAGNOSIS — G62.9 NEUROPATHY: ICD-10-CM

## 2022-01-31 DIAGNOSIS — K50.90 CROHN'S DISEASE WITHOUT COMPLICATION, UNSPECIFIED GASTROINTESTINAL TRACT LOCATION (HCC): ICD-10-CM

## 2022-02-01 LAB
ALBUMIN SERPL-MCNC: 4.1 G/DL (ref 3.5–5)
ALBUMIN/GLOB SERPL: 1.2 {RATIO} (ref 1.1–2.2)
ALP SERPL-CCNC: 68 U/L (ref 45–117)
ALT SERPL-CCNC: 16 U/L (ref 12–78)
ANION GAP SERPL CALC-SCNC: 2 MMOL/L (ref 5–15)
AST SERPL-CCNC: 12 U/L (ref 15–37)
BASOPHILS # BLD: 0 K/UL (ref 0–0.1)
BASOPHILS NFR BLD: 1 % (ref 0–1)
BILIRUB SERPL-MCNC: 0.4 MG/DL (ref 0.2–1)
BUN SERPL-MCNC: 10 MG/DL (ref 6–20)
BUN/CREAT SERPL: 14 (ref 12–20)
CALCIUM SERPL-MCNC: 9.1 MG/DL (ref 8.5–10.1)
CHLORIDE SERPL-SCNC: 107 MMOL/L (ref 97–108)
CO2 SERPL-SCNC: 27 MMOL/L (ref 21–32)
CREAT SERPL-MCNC: 0.7 MG/DL (ref 0.55–1.02)
DIFFERENTIAL METHOD BLD: NORMAL
EOSINOPHIL # BLD: 0.1 K/UL (ref 0–0.4)
EOSINOPHIL NFR BLD: 2 % (ref 0–7)
ERYTHROCYTE [DISTWIDTH] IN BLOOD BY AUTOMATED COUNT: 14 % (ref 11.5–14.5)
GLOBULIN SER CALC-MCNC: 3.5 G/DL (ref 2–4)
GLUCOSE SERPL-MCNC: 80 MG/DL (ref 65–100)
HCT VFR BLD AUTO: 41.5 % (ref 35–47)
HGB BLD-MCNC: 12.8 G/DL (ref 11.5–16)
IMM GRANULOCYTES # BLD AUTO: 0 K/UL (ref 0–0.04)
IMM GRANULOCYTES NFR BLD AUTO: 0 % (ref 0–0.5)
LYMPHOCYTES # BLD: 1.7 K/UL (ref 0.8–3.5)
LYMPHOCYTES NFR BLD: 29 % (ref 12–49)
MCH RBC QN AUTO: 27.1 PG (ref 26–34)
MCHC RBC AUTO-ENTMCNC: 30.8 G/DL (ref 30–36.5)
MCV RBC AUTO: 87.7 FL (ref 80–99)
MONOCYTES # BLD: 0.3 K/UL (ref 0–1)
MONOCYTES NFR BLD: 6 % (ref 5–13)
NEUTS SEG # BLD: 3.7 K/UL (ref 1.8–8)
NEUTS SEG NFR BLD: 62 % (ref 32–75)
NRBC # BLD: 0 K/UL (ref 0–0.01)
NRBC BLD-RTO: 0 PER 100 WBC
PLATELET # BLD AUTO: 258 K/UL (ref 150–400)
PMV BLD AUTO: 10.4 FL (ref 8.9–12.9)
POTASSIUM SERPL-SCNC: 4.4 MMOL/L (ref 3.5–5.1)
PROT SERPL-MCNC: 7.6 G/DL (ref 6.4–8.2)
RBC # BLD AUTO: 4.73 M/UL (ref 3.8–5.2)
SODIUM SERPL-SCNC: 136 MMOL/L (ref 136–145)
TSH SERPL DL<=0.05 MIU/L-ACNC: 1.01 UIU/ML (ref 0.36–3.74)
VIT B12 SERPL-MCNC: 591 PG/ML (ref 193–986)
WBC # BLD AUTO: 6 K/UL (ref 3.6–11)

## 2022-02-02 DIAGNOSIS — G56.23 CUBITAL TUNNEL SYNDROME, BILATERAL: Primary | ICD-10-CM

## 2022-02-09 ENCOUNTER — PATIENT MESSAGE (OUTPATIENT)
Dept: FAMILY MEDICINE CLINIC | Age: 32
End: 2022-02-09

## 2022-03-19 PROBLEM — F43.10 PTSD (POST-TRAUMATIC STRESS DISORDER): Status: ACTIVE | Noted: 2021-11-08

## 2022-03-19 PROBLEM — F32.A ANXIETY AND DEPRESSION: Status: ACTIVE | Noted: 2021-11-08

## 2022-03-19 PROBLEM — R41.840 ATTENTION DEFICIT: Status: ACTIVE | Noted: 2021-11-08

## 2022-03-19 PROBLEM — F41.9 ANXIETY AND DEPRESSION: Status: ACTIVE | Noted: 2021-11-08

## 2022-04-15 ENCOUNTER — VIRTUAL VISIT (OUTPATIENT)
Dept: FAMILY MEDICINE CLINIC | Age: 32
End: 2022-04-15
Payer: COMMERCIAL

## 2022-04-15 DIAGNOSIS — M54.6 ACUTE MIDLINE THORACIC BACK PAIN: Primary | ICD-10-CM

## 2022-04-15 PROCEDURE — 99443 PR PHYS/QHP TELEPHONE EVALUATION 21-30 MIN: CPT | Performed by: STUDENT IN AN ORGANIZED HEALTH CARE EDUCATION/TRAINING PROGRAM

## 2022-04-15 RX ORDER — LIDOCAINE 50 MG/G
PATCH TOPICAL
Qty: 8 EACH | Refills: 0 | Status: SHIPPED | OUTPATIENT
Start: 2022-04-15 | End: 2022-08-30

## 2022-04-15 RX ORDER — LIDOCAINE 50 MG/G
PATCH TOPICAL
Qty: 1 EACH | Refills: 0 | Status: CANCELLED | OUTPATIENT
Start: 2022-04-15

## 2022-04-15 NOTE — PROGRESS NOTES
Parish Henao  33 y.o. female  1990  [address] [facility]  [id number]    [phone] (home)      [address]:    Telephone Encounter  Teri Resendiz [place]       Encounter Date: 4/15/2022 at 11:52 AM    Consent: Parish Henao, who was seen by synchronous (real-time) audio only technology, and/or her healthcare decision maker, is aware that this patient-initiated, Telehealth encounter on 4/15/2022 is a billable service, with coverage as determined by her insurance carrier. She is aware that she may receive a bill and has provided verbal consent to proceed: Yes. Chief Complaint   Patient presents with    Back Pain     pleuritic, thoracic - PE previously ruled out       History of Present Illness   Parish Henao is a 33 y.o. female was evaluated by telephone. I communicated with the patient and/or health care decision maker about pleuritic back pain. Went to urgent care for chest pain 2 weeks ago at which time they diagnosed patient with pleurisy, however a CXR was not done. Given prednisone and methocarbamol which have helped some (has to take 2 of the methacarbomol pills for it to work). Patient then went to the hospital for chest pain, at which time they reportedly did a workup to rule out pulmonary embolism which was negative and recommended completing steroid course. Current pain is located at back between shoulder blades, not reproducible with palpation however does occur when she stretches her arms. Denies SOB or cough or abdominal pain. Has back pain pain when she stretches between her shoulder blades. The back pain does not radiate. Has tried ibuprofen, tylenol, icy-hot, and heating pad for this without help. Review of Systems   Review of Systems   Constitutional: Negative for chills and fever. Respiratory: Negative for cough, sputum production, shortness of breath and wheezing. Cardiovascular: Negative for chest pain.    Gastrointestinal: Negative for abdominal pain, diarrhea, nausea and vomiting. Musculoskeletal: Positive for back pain (pleuritic) and myalgias. Vitals/Objective:   General: Patient speaking in complete sentences without effort. Normal speech and cooperative. Due to this being a Virtual Check-in/Telephone evaluation, many elements of the physical examination are unable to be assessed. Assessment and Plan: Total Time: minutes: 11-20 minutes    1. Acute midline thoracic back pain: pleuritic in nature, PE ruled out in [facility] ED. Diagnosed with pleurisy however CXR reportedly normal. Given exacerbation with arm movement and location in back seems more MSK in nature. Muscle relaxants also help which supports this, however make patient drowsy. Will arrange for sports me f/u for possible trigger point injection. Requesting pain medication today, will provide lidocaine patch for focal analgesia. - complete steroid taper (3 more days)  - can continue methocarbamol   - lidocaine (LIDODERM) 5 %; Apply patch to the affected area for 12 hours a day and remove for 12 hours a day. Dispense: 8 Each; Refill: 0  - Continue tylenol/ibuprofen and heating pad  - Will arrange for sports medicine follow up for possible trigger point injection    Patient informed to follow up: Sports med nearest available,  to schedule    I affirm this is a Patient Initiated Episode with an Established Patient who has not had a related appointment within my department in the past 7 days or scheduled within the next 24 hours. Note: not billable if this call serves to triage the patient into an appointment for the relevant concern      Electronically Signed: Diony Swartz DO  Providers location when delivering service: home    CPT:  87033 (5-10 minutes)  36957 (11-20 minutes)  21  (21-30 minutes)    Medicare:  [address]      ICD-10-CM ICD-9-CM    1.  Acute midline thoracic back pain  M54.6 724.1 lidocaine (LIDODERM) 5 %       Pursuant to the emergency declaration under the [facility] waiver authority and the [employer] and [employer] Act, this Virtual  Visit was conducted, with patient's consent, to reduce the patient's risk of exposure to COVID-19 and provide continuity of care for an established patient. History   Patients past medical, surgical and family histories were personally reviewed and updated. Past Medical History:   Diagnosis Date    Asthma     Carpal tunnel syndrome     bilateral    Crohn's disease ([address]     Environmental and seasonal allergies     GERD (gastroesophageal reflux disease)     Ill-defined condition     crohn's    Migraines     Psychiatric disorder     anxiety    PUD (peptic ulcer disease)     Vertigo      Past Surgical History:   Procedure Laterality Date    COLONOSCOPY N/A 2019    [facility]    HX  SECTION      HX OVARIAN CYST REMOVAL      HX TONSILLECTOMY       Family History   Problem Relation Age of Onset    Diabetes Mother     Hypertension Mother    [facility] Elevated Lipids Mother     Hypertension Father    [facility] Elevated Lipids Father      Social History     Tobacco Use    Smoking status: Former Smoker     Types: Cigarettes     Quit date: 2019     Years since quittin.9    Smokeless tobacco: Never Used   Substance Use Topics    Alcohol use: Yes     Alcohol/week: 1.0 standard drink     Types: 1 Glasses of wine per week    Drug use: No              Current Medications/Allergies   Medications and Allergies reviewed:    Current Outpatient Medications   Medication Sig Dispense Refill    lidocaine (LIDODERM) 5 % Apply patch to the affected area for 12 hours a day and remove for 12 hours a day. 8 Each 0    Minoxidil 5 % foam Apply 1/2 capful once daily.  60 g 1    ketoconazole (NIZORAL) 2 % shampoo Apply 5 to 10 mL to wet scalp, lather, leave on 3 to 5 minutes, and rinse; apply twice weekly for 2 to 4 weeks. then apply only as needed to control dandruff 120 mL 1    albuterol (PROVENTIL HFA, VENTOLIN HFA, PROAIR HFA) 90 mcg/actuation inhaler INHALE 2 PUFFS BY MOUTH EVERY 4 HOURS AS NEEDED FOR WHEEZE 1 Each 2    EPINEPHrine (EPIPEN) 0.3 mg/0.3 mL injection       prochlorperazine (COMPAZINE) 5 mg tablet       sertraline (ZOLOFT) 50 mg tablet Take 1 Tablet by mouth daily.  90 Tablet 1    lansoprazole (PREVACID) 15 mg capsule TAKE 1 CAPSULE BY MOUTH EVERY DAY       Allergies   Allergen Reactions    Shellfish Derived Hives     Not allergic    Tape [Adhesive] Contact Dermatitis, Itching, Rash and Swelling

## 2022-04-19 NOTE — PROGRESS NOTES
[address] Medicine Residency Attending Addendum:  The resident physician, the patient and I were not physically present during this encounter. The resident and I are concurrently monitoring the patient care through appropriate telecommunication technology. I discussed the findings, assessment and plan with the resident and agree with the resident's findings and plan as documented in the resident's note.

## 2022-04-22 ENCOUNTER — TELEPHONE (OUTPATIENT)
Dept: CARDIOLOGY CLINIC | Age: 32
End: 2022-04-22

## 2022-04-22 NOTE — TELEPHONE ENCOUNTER
Patient is calling because she states that the appt she had scheduled for 4.22.22 at 8:40am and it was suppose to be virtual ? Please Advise.     [phone]

## 2022-04-28 ENCOUNTER — OFFICE VISIT (OUTPATIENT)
Dept: CARDIOLOGY CLINIC | Age: 32
End: 2022-04-28
Payer: COMMERCIAL

## 2022-04-28 VITALS — BODY MASS INDEX: 24.55 KG/M2 | WEIGHT: 143 LBS

## 2022-04-28 DIAGNOSIS — R42 DIZZINESS ON STANDING: ICD-10-CM

## 2022-04-28 DIAGNOSIS — R00.2 PALPITATIONS: ICD-10-CM

## 2022-04-28 DIAGNOSIS — J45.40 MODERATE PERSISTENT ASTHMA WITHOUT COMPLICATION: ICD-10-CM

## 2022-04-28 DIAGNOSIS — R07.9 CHEST PAIN, UNSPECIFIED TYPE: ICD-10-CM

## 2022-04-28 DIAGNOSIS — R07.89 ATYPICAL CHEST PAIN: Primary | ICD-10-CM

## 2022-04-28 LAB
CRP SERPL HS-MCNC: 5 MG/L
ERYTHROCYTE [SEDIMENTATION RATE] IN BLOOD: 6 MM/HR (ref 0–20)

## 2022-04-28 PROCEDURE — 99214 OFFICE O/P EST MOD 30 MIN: CPT | Performed by: NURSE PRACTITIONER

## 2022-04-28 PROCEDURE — 93000 ELECTROCARDIOGRAM COMPLETE: CPT | Performed by: NURSE PRACTITIONER

## 2022-04-28 RX ORDER — BISMUTH SUBSALICYLATE 262 MG
1 TABLET,CHEWABLE ORAL DAILY
COMMUNITY
End: 2022-08-30

## 2022-04-28 RX ORDER — SERTRALINE HYDROCHLORIDE 100 MG/1
150 TABLET, FILM COATED ORAL DAILY
COMMUNITY
End: 2022-08-30

## 2022-04-28 RX ORDER — ZOLPIDEM TARTRATE 5 MG/1
TABLET ORAL DAILY
COMMUNITY

## 2022-04-28 RX ORDER — DEXTROAMPHETAMINE SACCHARATE, AMPHETAMINE ASPARTATE MONOHYDRATE, DEXTROAMPHETAMINE SULFATE AND AMPHETAMINE SULFATE 5; 5; 5; 5 MG/1; MG/1; MG/1; MG/1
20 CAPSULE, EXTENDED RELEASE ORAL DAILY
COMMUNITY

## 2022-04-28 NOTE — PROGRESS NOTES
Williams Wang, [place]  [address] [place], [address] [facility]    Office [phone]  Fax [phone]          Assessment:  Atypical CP   Palpitations  Positional dizziness     Plan:   CP sounds non-cardiac - pt low risk for ischemic event based on age / risk factors  Per pt, recent d-dimer was nml  prev told inflammatory in nature; however, prednisone did not help  Will check ESR and CRP, check stress echo  Dizziness with positional changes / racing HB - orthostatics negative   Check 30d loop - advised to hydrate well with water   Recent labs nml in Jan - CBC, CMP, and TSH    F/u in 6-8wks (post testing) with Dr. Jose Weinstein     Patient understands the plan. All questions were answered to the patient's satisfaction. Medication Side Effects and Warnings were discussed with patient: yes  Patient Labs were reviewed and or requested:  yes  Patient Past Records were reviewed and or requested: yes    I appreciate the opportunity to be involved in Ms. Duval. Please see note below for details. Please do not hesitate to contact us with questions or concerns. Dante Ivory NP    Cardiac Testing/ Procedures: A. Cardiac Cath/PCI:    B.ECHO/AYO:    C.StressNuclear/Stress ECHO/Stress test:    D.Vascular:    E. EP:    F. Miscellaneous:    History of present illness:  Here for fu of CP. [facility] with out resolve. States pain is a constant dull ache in her chest - has sharp pain with deep breaths. Dull pain feels worse with physical activity. States she had a D-Dimer which was normal to r/o clot. Was told she had inflammation - s/p prednisone course as well as lidocaine patch. States this didn't help at all. Has asthma as well as seasonal allergies. States she is followed closely by Dr. Jossy De La O who she recently saw - states asthma/allergies are stable. Gets dizzy when she stands up.   Denies falls, presyncope, and syncope. Heart races until dizziness resolves. Hx of smoking x2-3yrs; quit in 2019. Smoked very little when she smoked - maybe 2-3 cigs/d. Denies family hx of CAD. 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 States she can't mess up. Has small children. Past Medical History:   Diagnosis Date    Asthma     Carpal tunnel syndrome     bilateral    Crohn's disease ([address]     Environmental and seasonal allergies     GERD (gastroesophageal reflux disease)     Ill-defined condition     crohn's    Migraines     Psychiatric disorder     anxiety    PUD (peptic ulcer disease)     Vertigo       Past Surgical History:   Procedure Laterality Date    COLONOSCOPY N/A 2019    COL[facility]    HX  SECTION      HX OVARIAN CYST REMOVAL      HX TONSILLECTOMY       Family History   Problem Relation Age of Onset    Diabetes Mother     Hypertension Mother    [facility] Elevated Lipids Mother     Hypertension Father    [facility] Elevated Lipids Father       Social History     Tobacco Use    Smoking status: Former Smoker     Types: Cigarettes     Quit date: 2019     Years since quitting: 3.0    Smokeless tobacco: Never Used   Substance Use Topics    Alcohol use: Yes     Alcohol/week: 1.0 standard drink     Types: 1 Glasses of wine per week    Drug use: No          Medications before admission:    Current Outpatient Medications   Medication Sig Dispense    sertraline (Zoloft) 100 mg tablet Take 150 mg by mouth daily.  amphetamine-dextroamphetamine XR (Adderall XR) 20 mg XR capsule Take 20 mg by mouth daily.  multivitamin (ONE A DAY) tablet Take 1 Tablet by mouth daily.  zolpidem (Ambien) 5 mg tablet Take  by mouth daily.  lidocaine (LIDODERM) 5 % Apply patch to the affected area for 12 hours a day and remove for 12 hours a day.  8 Each    ketoconazole (NIZORAL) 2 % shampoo Apply 5 to 10 mL to wet scalp, lather, leave on 3 to 5 minutes, and rinse; apply twice weekly for 2 to 4 weeks. then apply only as needed to control dandruff 120 mL    albuterol (PROVENTIL HFA, VENTOLIN HFA, PROAIR HFA) 90 mcg/actuation inhaler INHALE 2 PUFFS BY MOUTH EVERY 4 HOURS AS NEEDED FOR WHEEZE 1 Each    EPINEPHrine (EPIPEN) 0.3 mg/0.3 mL injection      lansoprazole (PREVACID) 15 mg capsule TAKE 1 CAPSULE BY MOUTH EVERY DAY      No current facility-administered medications for this visit. Review of Systems:  (Positive findings above)  Constitutional: Negative for fever,   Respiratory: Negative for cough, hemoptysis, sputum production, shortness of breath and wheezing. Cardiovascular: Negative for  leg swelling and PND. Gastrointestinal: Negative for vomiting, blood in stool and melena. Genitourinary: Negative for dysuria and flank pain. Neurological: Negative for focal weakness, seizures, loss of consciousness  Endo/Heme/Allergies: Negative for abnormal bleeding. Psychiatric/Behavioral: Negative for memory loss. Physical Exam:  Visit Vitals  Wt 143 lb (64.9 kg)   BMI 24.55 kg/m²     VS per Ivan Irizarry RN  Supine 109/70, HR 88  Standing 110/70,      General - well developed well nourished  Neck - neck supple, no JVD  Cardiac - normal S1, S2, RRR, no murmurs, rubs or gallops.  No clicks  Vascular - carotids without bruits, radials pulses equal bilateral  Lungs - clear to auscultation bilaterals, no rales, wheezing or rhonchi  Abd - soft nontender, non-distended, +BS  Extremities - no edema, warm  Neuro - nonfocal  Psych - normal mood and affect      EKG: NSR    LABS:    Lab Results   Component Value Date/Time    Sodium 136 01/31/2022 11:35 AM    Potassium 4.4 01/31/2022 11:35 AM    Chloride 107 01/31/2022 11:35 AM    CO2 27 01/31/2022 11:35 AM    Anion gap 2 (L) 01/31/2022 11:35 AM    Glucose 80 01/31/2022 11:35 AM    BUN 10 01/31/2022 11:35 AM    Creatinine 0.70 01/31/2022 11:35 AM    BUN/Creatinine ratio 14 01/31/2022 11:35 AM    GFR est AA >60 01/31/2022 11:35 AM    GFR est non-AA >60 01/31/2022 11:35 AM    Calcium 9.1 01/31/2022 11:35 AM    Bilirubin, total 0.4 01/31/2022 11:35 AM    Alk. phosphatase 68 01/31/2022 11:35 AM    Protein, total 7.6 01/31/2022 11:35 AM    Albumin 4.1 01/31/2022 11:35 AM    Globulin 3.5 01/31/2022 11:35 AM    A-G Ratio 1.2 01/31/2022 11:35 AM    ALT (SGPT) 16 01/31/2022 11:35 AM    AST (SGOT) 12 (L) 01/31/2022 11:35 AM     Lab Results   Component Value Date/Time    WBC 6.0 01/31/2022 11:35 AM    HGB 12.8 01/31/2022 11:35 AM    HCT 41.5 01/31/2022 11:35 AM    PLATELET 852 92/52/9495 11:35 AM    MCV 87.7 01/31/2022 11:35 AM     Lab Results   Component Value Date/Time    Cholesterol, total 159 07/26/2021 02:15 PM    HDL Cholesterol 58 07/26/2021 02:15 PM    LDL, calculated 83.8 07/26/2021 02:15 PM    VLDL, calculated 17.2 07/26/2021 02:15 PM    Triglyceride 86 07/26/2021 02:15 PM    CHOL/HDL Ratio 2.7 07/26/2021 02:15 PM     Lab Results   Component Value Date/Time    TSH 1.01 01/31/2022 11:35 AM    T4, Free 1.0 07/27/2020 11:28 AM     Lab Results   Component Value Date/Time    Hemoglobin A1c 5.0 07/27/2020 11:28 AM     No results found for: CPK, RCK1, RCK2, RCK3, RCK4, CKMB, CKNDX, CKND1, TROPT, TROIQ, BNPP, BNP    ATTENTION:   This medical record was transcribed using an electronic medical records/speech recognition system. Although proofread, it may and can contain electronic, spelling and other errors. Corrections may be executed at a later time. Please feel free to contact us for any clarifications as needed.         Toula Bouillon, NP

## 2022-04-28 NOTE — PATIENT INSTRUCTIONS
I will check an stress echo (exercise) as well as 7d loop monitor    Please get labs done later today or next week.       See Dr. Joanna Mcgee in 6 to 8 weeks (post testing)

## 2022-04-28 NOTE — LETTER
5/4/2022    Patient: Claudetta Book   YOB: 1990   Date of Visit: 4/28/2022     Santi Huston, [address]  [facility] [facility]  [address] [place] [address] [address]  [address] [place] [address]  V[facility] [address]    Dear Santi Huston, Jeane Feliz MD,      Thank you for referring Ms. Claudetta Book to [address] for evaluation. My notes for this consultation are attached. If you have questions, please do not hesitate to call me. I look forward to following your patient along with you.       Sincerely,    Roger Wiley NP

## 2022-05-01 RX ORDER — ALBUTEROL SULFATE 90 UG/1
AEROSOL, METERED RESPIRATORY (INHALATION)
Qty: 6.7 EACH | Refills: 2 | Status: SHIPPED | OUTPATIENT
Start: 2022-05-01 | End: 2022-10-17

## 2022-05-04 NOTE — PROGRESS NOTES
Vitals:    04/28/22 1118 04/28/22 1127   BP 2: 109/70 110/70   BP Patient Position: Supine Standing   Pulse 2: 88 108   Weight: 143 lb (64.9 kg)

## 2022-05-05 ENCOUNTER — TELEPHONE (OUTPATIENT)
Dept: CARDIOLOGY CLINIC | Age: 32
End: 2022-05-05

## 2022-05-05 NOTE — TELEPHONE ENCOUNTER
Judith Baldwin and I had messaged on 5/4/2022 about patients stress echo for today, 5/5/2022. I advised that we were waiting to see if it got approved and that I'd advise on status this morning. The case is still pending.   Please call patient to cancel test.     Thank you,     Shanice Martins

## 2022-05-08 DIAGNOSIS — L65.9 HAIR LOSS: ICD-10-CM

## 2022-05-08 RX ORDER — KETOCONAZOLE 20 MG/ML
SHAMPOO TOPICAL
Qty: 120 ML | Refills: 1 | Status: SHIPPED | OUTPATIENT
Start: 2022-05-08 | End: 2022-08-30

## 2022-05-09 NOTE — TELEPHONE ENCOUNTER
Authorization for stress echo that was scheduled for 5/5/2022 has been denied. Peer to peer can be done with [employer]) by calling [phone] option 4 8:00 am to 9:00 pm Eastern Time, Monday through Friday.       Case reference #[id number]    Thank you,     Elizabeth Valera

## 2022-06-12 ENCOUNTER — DOCUMENTATION ONLY (OUTPATIENT)
Dept: CARDIOLOGY CLINIC | Age: 32
End: 2022-06-12

## 2022-06-13 NOTE — PROGRESS NOTES
Event monitor 5/3/2022-6/1/2022  Ordered by Ms. Franco on 4/28/2022  No-show for appointment 6/7/2022  No significant arrhythmias  1 episode of patient triggered event lightheadedness-dizziness associated with sinus tachycardia/sinus rhythm  Minimum heart rate 53 bpm, maximum heart rate 143 bpm

## 2022-07-18 ENCOUNTER — DOCUMENTATION ONLY (OUTPATIENT)
Dept: FAMILY MEDICINE CLINIC | Age: 32
End: 2022-07-18

## 2022-07-18 ENCOUNTER — VIRTUAL VISIT (OUTPATIENT)
Dept: FAMILY MEDICINE CLINIC | Age: 32
End: 2022-07-18

## 2022-07-19 ENCOUNTER — NURSE TRIAGE (OUTPATIENT)
Dept: OTHER | Facility: CLINIC | Age: 32
End: 2022-07-19

## 2022-07-19 NOTE — PROGRESS NOTES
Patient scheduled for virtual visit. Sent link for visit with no response. Attempted to contact patient by telephone for visit but did not answer. Left VM. Patient not seen. Message sent to [facility] front office to try to re-schedule patient for office visit.

## 2022-07-19 NOTE — TELEPHONE ENCOUNTER
Received call from Yakelin Gama at [facility] with Red Flag Complaint. Subjective: Caller states \"I take adderall and certraline during the day. My psychologist added wellbutrin. When I take the wellbutrin, I become very shaky. I also had confusion. I started the wellbutrin 2 days ago and yesterday, after feeling this way, I stopped all of those medications. The symptoms are getting better but I still have the brain fog.\"     Current Symptoms: confusion    Onset: 2 days    Associated Symptoms: shaking    Pain Severity: denies pain    Temperature: NA    What has been tried: NA    LMP: NA Pregnant: NA    Recommended disposition: See in Office Today    Care advice provided, patient verbalizes understanding; denies any other questions or concerns; instructed to call back for any new or worsening symptoms. Patient/Caller agrees with recommended disposition; writer provided warm transfer to [place] at [facility] for appointment scheduling    Attention Provider: Thank you for allowing me to participate in the care of your patient. The patient was connected to triage in response to information provided to the [facility]. Please do not respond through this encounter as the response is not directed to a shared pool.     Reason for Disposition   Patient wants to be seen (or caregiver requests)    Protocols used: CONFUSION - DELIRIUM-ADULT-OH

## 2022-08-23 ENCOUNTER — VIRTUAL VISIT (OUTPATIENT)
Dept: FAMILY MEDICINE CLINIC | Age: 32
End: 2022-08-23
Payer: COMMERCIAL

## 2022-08-23 DIAGNOSIS — M54.9 CHRONIC BACK PAIN, UNSPECIFIED BACK LOCATION, UNSPECIFIED BACK PAIN LATERALITY: Primary | ICD-10-CM

## 2022-08-23 DIAGNOSIS — G89.29 CHRONIC BACK PAIN, UNSPECIFIED BACK LOCATION, UNSPECIFIED BACK PAIN LATERALITY: Primary | ICD-10-CM

## 2022-08-23 PROCEDURE — 99214 OFFICE O/P EST MOD 30 MIN: CPT | Performed by: STUDENT IN AN ORGANIZED HEALTH CARE EDUCATION/TRAINING PROGRAM

## 2022-08-23 RX ORDER — METHOCARBAMOL 750 MG/1
750 TABLET, FILM COATED ORAL 3 TIMES DAILY
Qty: 15 TABLET | Refills: 0 | Status: SHIPPED | OUTPATIENT
Start: 2022-08-23 | End: 2022-08-30

## 2022-08-23 RX ORDER — NAPROXEN 500 MG/1
500 TABLET ORAL 2 TIMES DAILY WITH MEALS
Qty: 10 TABLET | Refills: 0 | Status: SHIPPED | OUTPATIENT
Start: 2022-08-23 | End: 2022-08-30

## 2022-08-23 NOTE — PROGRESS NOTES
[address] Medicine Residency Attending Addendum:  Dr. Russel Muir MD,  the patient and I were not physically present during this encounter. The resident and I are concurrently monitoring the patient care through appropriate telecommunication technology. I discussed the findings, assessment and plan with the resident and agree with the resident's findings and plan as documented in the resident's note.       Natasha Gray MD

## 2022-08-25 NOTE — PROGRESS NOTES
Denton Smith  33 y.o. female  1990  [address] [facility] [place]  [id number]   [address]:    Telemedicine Progress Note  Judah Guadarrama MD       Encounter Date and Time: August 24, 2022 at 8:49 PM    Consent: Denton Smith, who was seen by synchronous (real-time) audio-video technology, and/or her healthcare decision maker, is aware that this patient-initiated, Telehealth encounter on 8/23/2022 is a billable service, with coverage as determined by her insurance carrier. She is aware that she may receive a bill and has provided verbal consent to proceed: Yes. No chief complaint on file. History of Present Illness   Denton Smith is a 33 y.o. female was evaluated by synchronous (real-time) audio-video technology from Pt home, through a secure patient portal.    Back pain: Pain mainly in mid upper back. In past, w/up for other etiology of back pain with MSK most likely. Reports recent urgent care visit where she received Toradol and Prednisone taper. This appeared to improve pain. Would be interested in physical therapy. No f/c/night sweats. No loss of bowel/bladder function or saddle anesthesia. Complete ROS performed and pertinent positives and negatives are as documented in HPI. Vitals/Objective:     General: alert, cooperative, no distress   Mental  status: mental status: alert, oriented to person, place, and time, normal mood, behavior, speech, dress, motor activity, and thought processes   Resp: resp: normal effort and no respiratory distress   Neuro: neuro: no gross deficits   Skin: skin: no discoloration or lesions of concern on visible areas   Due to this being a TeleHealth evaluation, many elements of the physical examination are unable to be assessed. Assessment and Plan:     1. Chronic back pain, unspecified back location, unspecified back pain laterality  Pt given ED precautions.  Would start w conservative management - muscle relaxer and NSAID as well as heat/stretching. Pt amenable to PT. Will sign referral for [facility] PT, pt to  referral. Will be seen by sports medicine on 8/26.   - methocarbamoL (ROBAXIN) 750 mg tablet; Take 1 Tablet by mouth three (3) times daily. Dispense: 15 Tablet; Refill: 0  - naproxen (NAPROSYN) 500 mg tablet; Take 1 Tablet by mouth two (2) times daily (with meals). Dispense: 10 Tablet; Refill: 0          Time spent in direct conversation with the patient to include medical condition(s) discussed, assessment and treatment plan:        We discussed the expected course, resolution and complications of the diagnosis(es) in detail. Medication risks, benefits, costs, interactions, and alternatives were discussed as indicated. I advised her to contact the office if her condition worsens, changes or fails to improve as anticipated. She expressed understanding with the diagnosis(es) and plan. Patient understands that this encounter was a temporary measure, and the importance of further follow up and examination was emphasized. Patient verbalized understanding. Patient informed to follow up: 8/26 for appt w Dr. Judith Villegas. Electronically Signed: Jaida Stearns MD      Koki Olmedo is a 33 y.o. female who was evaluated by an audio-video encounter for concerns as above. Patient identification was verified prior to start of the visit. A caregiver was present when appropriate. Due to this being a TeleHealth encounter ([facility]), evaluation of the following organ systems was limited: Vitals/Constitutional/EENT/Resp/CV/GI//MS/Neuro/Skin/Heme-Lymph-Imm.   Pursuant to the emergency declaration under the [facility] waiver authority and the [employer] and [employer] Act, this Virtual Visit was conducted, with patient's (and/or legal guardian's) consent, to reduce the patient's risk of exposure to COVID-19 and provide necessary medical care. Services were provided through a synchronous discussion virtually to substitute for in-person clinic visit. I was at home. The patient was at home. History   Patients past medical, surgical and family histories were reviewed and updated. Past Medical History:   Diagnosis Date    Asthma     Carpal tunnel syndrome     bilateral    Crohn's disease ([address]     Environmental and seasonal allergies     GERD (gastroesophageal reflux disease)     Ill-defined condition     crohn's    Migraines     Psychiatric disorder     anxiety    PUD (peptic ulcer disease)     Vertigo      Past Surgical History:   Procedure Laterality Date    COLONOSCOPY N/A 2019    COLONOSCOPY performed by Sagar Barrera MD at [facility] ENDOSCOPY    HX  SECTION      HX OVARIAN CYST REMOVAL      HX TONSILLECTOMY       Family History   Problem Relation Age of Onset    Diabetes Mother     Hypertension Mother     Elevated Lipids Mother     Hypertension Father     Elevated Lipids Father      Social History     Tobacco Use    Smoking status: Former     Types: Cigarettes     Quit date: 2019     Years since quitting: 3.3    Smokeless tobacco: Never   Substance Use Topics    Alcohol use: Yes     Alcohol/week: 1.0 standard drink     Types: 1 Glasses of wine per week    Drug use: No     Patient Active Problem List   Diagnosis Code    Environmental and seasonal allergies J30.89    Crohn's disease ([address] K50.90    Asthma J45.909    GERD (gastroesophageal reflux disease) K21.9    Migraines G43.909    Vertigo R42    Anxiety and depression F41.9, F32. A    Attention deficit R41.840    PTSD (post-traumatic stress disorder) F43.10          Current Medications/Allergies   Medications and Allergies reviewed:    Current Outpatient Medications   Medication Sig Dispense Refill    methocarbamoL (ROBAXIN) 750 mg tablet Take 1 Tablet by mouth three (3) times daily.  15 Tablet 0    naproxen (NAPROSYN) 500 mg tablet Take 1 Tablet by mouth two (2) times daily (with meals). 10 Tablet 0    ketoconazole (NIZORAL) 2 % shampoo APPLY 5 TO 10 ML TO WET SCALP, LATHER, LEAVE ON 3 TO 5 MINUTES, AND RINSE APPLY TWICE WEEKLY FOR 2 TO 4 WEEKS. THEN APPLY AS NEEDED FOR DANDRUFF 120 mL 1    albuterol (PROVENTIL HFA, VENTOLIN HFA, PROAIR HFA) 90 mcg/actuation inhaler INHALE 2 PUFFS BY MOUTH EVERY 4 HOURS AS NEEDED FOR WHEEZING 6.7 Each 2    sertraline (Zoloft) 100 mg tablet Take 150 mg by mouth daily. amphetamine-dextroamphetamine XR (Adderall XR) 20 mg XR capsule Take 20 mg by mouth daily. multivitamin (ONE A DAY) tablet Take 1 Tablet by mouth daily. zolpidem (Ambien) 5 mg tablet Take  by mouth daily. lidocaine (LIDODERM) 5 % Apply patch to the affected area for 12 hours a day and remove for 12 hours a day. 8 Each 0    EPINEPHrine (EPIPEN) 0.3 mg/0.3 mL injection       lansoprazole (PREVACID) 15 mg capsule TAKE 1 CAPSULE BY MOUTH EVERY DAY       Allergies   Allergen Reactions    Shellfish Derived Hives     Not allergic    Tape [Adhesive] Contact Dermatitis, Itching, Rash and Swelling        Patient discussed with Dr. Magalie Hatch.

## 2022-08-26 ENCOUNTER — OFFICE VISIT (OUTPATIENT)
Dept: FAMILY MEDICINE CLINIC | Age: 32
End: 2022-08-26
Payer: COMMERCIAL

## 2022-08-26 VITALS
RESPIRATION RATE: 18 BRPM | BODY MASS INDEX: 23.73 KG/M2 | SYSTOLIC BLOOD PRESSURE: 97 MMHG | WEIGHT: 139 LBS | TEMPERATURE: 97.5 F | HEART RATE: 91 BPM | OXYGEN SATURATION: 99 % | HEIGHT: 64 IN | DIASTOLIC BLOOD PRESSURE: 62 MMHG

## 2022-08-26 DIAGNOSIS — M54.9 CHRONIC BACK PAIN, UNSPECIFIED BACK LOCATION, UNSPECIFIED BACK PAIN LATERALITY: Primary | ICD-10-CM

## 2022-08-26 DIAGNOSIS — G89.29 CHRONIC BACK PAIN, UNSPECIFIED BACK LOCATION, UNSPECIFIED BACK PAIN LATERALITY: Primary | ICD-10-CM

## 2022-08-26 PROCEDURE — 99213 OFFICE O/P EST LOW 20 MIN: CPT | Performed by: FAMILY MEDICINE

## 2022-08-26 NOTE — PROGRESS NOTES
Adiel Alston is a 33 y.o. female    Chief Complaint   Patient presents with    Back Pain     Intermittent upper back/neck pain for months. Aggravated at work - does a lot of bending & standing for long periods of time. Describes pain as constant, dull pain - neck also feels very stiff. Can be sharp at times. Doesn't feel like otc meds are working. Has tried heat/cold, icy hot, and stretches at home       Visit Vitals  BP 97/62 (BP 1 Location: Left upper arm, BP Patient Position: Sitting, BP Cuff Size: Adult)   Pulse 91   Temp 97.5 °F (36.4 °C) (Temporal)   Resp 18   Ht 5' 4\" (1.626 m)   Wt 139 lb (63 kg)   SpO2 99%   BMI 23.86 kg/m²       1. \"Have you been to the ER, urgent care clinic since your last visit? Hospitalized since your last visit? \" No    2. \"Have you seen or consulted any other health care providers outside of the [address] [place] [address] since your last visit? \" No     3. For patients aged 39-70: Has the patient had a colonoscopy / FIT/ Cologuard? NA - based on age        Health Maintenance Due   Topic Date Due    Pneumococcal 0-64 years (1 - PCV) Never done    Cervical cancer screen  01/18/2021         Medication Reconciliation completed, changes noted.   Please update medication list.

## 2022-08-30 NOTE — PROGRESS NOTES
[address] [facility]      Chief Complaint:   Chief Complaint   Patient presents with    Back Pain     Intermittent upper back/neck pain for months. Aggravated at work - does a lot of bending & standing for long periods of time. Describes pain as constant, dull pain - neck also feels very stiff. Can be sharp at times. Doesn't feel like otc meds are working. Has tried heat/cold, icy hot, and stretches at home       History of Present Illness     Patient Identification  Carmella Orr is a 33 y.o. female complains of pain in the bilateral shoulder pain and upper back pain. Pain mostly in the parascapular region. No injury or trauma. Worse with activities and improves with rest.  No radicular sx. No cp/sob. H/o anxiety and sx seem worse when she is stressed and anxious. She has tried tylenol/NSAIDs and HEP with minimal relief. She has been evaluated in urgent care and given muscle relaxer that seem to help while she is taking them. Past Medical History:   Diagnosis Date    Asthma     Carpal tunnel syndrome     bilateral    Crohn's disease ([address]     Environmental and seasonal allergies     GERD (gastroesophageal reflux disease)     Ill-defined condition     crohn's    Migraines     Psychiatric disorder     anxiety    PUD (peptic ulcer disease)     Vertigo      Family History   Problem Relation Age of Onset    Diabetes Mother     Hypertension Mother     Elevated Lipids Mother     Hypertension Father     Elevated Lipids Father      Current Outpatient Medications   Medication Sig Dispense Refill    albuterol (PROVENTIL HFA, VENTOLIN HFA, PROAIR HFA) 90 mcg/actuation inhaler INHALE 2 PUFFS BY MOUTH EVERY 4 HOURS AS NEEDED FOR WHEEZING 6.7 Each 2    amphetamine-dextroamphetamine XR (ADDERALL XR) 20 mg XR capsule Take 20 mg by mouth daily. zolpidem (AMBIEN) 5 mg tablet Take  by mouth daily.       EPINEPHrine (EPIPEN) 0.3 mg/0.3 mL injection       lansoprazole (PREVACID) 15 mg capsule TAKE 1 CAPSULE BY MOUTH EVERY DAY      methocarbamoL (ROBAXIN) 750 mg tablet Take 1 Tablet by mouth three (3) times daily. (Patient not taking: Reported on 8/26/2022) 15 Tablet 0    naproxen (NAPROSYN) 500 mg tablet Take 1 Tablet by mouth two (2) times daily (with meals). (Patient not taking: Reported on 8/26/2022) 10 Tablet 0    ketoconazole (NIZORAL) 2 % shampoo APPLY 5 TO 10 ML TO WET SCALP, LATHER, LEAVE ON 3 TO 5 MINUTES, AND RINSE APPLY TWICE WEEKLY FOR 2 TO 4 WEEKS. THEN APPLY AS NEEDED FOR DANDRUFF 120 mL 1    sertraline (Zoloft) 100 mg tablet Take 150 mg by mouth daily. multivitamin (ONE A DAY) tablet Take 1 Tablet by mouth daily. (Patient not taking: Reported on 8/26/2022)      lidocaine (LIDODERM) 5 % Apply patch to the affected area for 12 hours a day and remove for 12 hours a day. 8 Each 0     Allergies   Allergen Reactions    Shellfish Derived Hives     Not allergic    Tape [Adhesive] Contact Dermatitis, Itching, Rash and Swelling       Review of Systems  Pertinent items are noted in HPI. Physical Exam     Visit Vitals  BP 97/62 (BP 1 Location: Left upper arm, BP Patient Position: Sitting, BP Cuff Size: Adult)   Pulse 91   Temp 97.5 °F (36.4 °C) (Temporal)   Resp 18   Ht 5' 4\" (1.626 m)   Wt 139 lb (63 kg)   SpO2 99%   BMI 23.86 kg/m²       GEN: Well appearing. No apparent distress. Responds to all questions appropriately. Lungs: No labored respirations. Talking in complete sentences without difficulty.   Shoulder: bilateral  Deformity: None    ROM:  Forward Flexion: Active: 180     ER (0): Active: 45     IR (0): Active: Behind the body to the level T5  Abduction: Active: 180       Palpation:  AC tenderness: None  SC tenderness: None  Clavicle tenderness: None  Biceps tenderness: None    Strength (0-5/5):  Deltoid - Anterior: 5/5  Deltoid - Posterior: 5/5  Deltoid - Mid: 5/5  Supraspinatus: 5/5  External rotation: 5/5  Internal rotation: 5/5    Rotator Cuff Exam:  Neers sign: Negative  May sign: Negative  Painful Arc: Negative  Lift-off sign / Belly Press: Negative    Neuro/Vascular:  Pulses intact, no edema, and neurologically intact    C-Spine:  Cervical motion: FROM without pain. Cervical tenderness: None    Skin: No obvious rash or skin breakdown. Assessment:    ICD-10-CM ICD-9-CM    1. Chronic back pain, unspecified back location, unspecified back pain laterality  M54.9 724.5     G89.29 338.29         She has minimal sx today. Sx likely to to muscle spasm. Could be related to anxiety as well. Plan:  1. Home Exercise Program as per handout. 2. Ice 15 minutes, three times a day PRN and after exercise. Can alternate with heat for 15 minutes. 3. Discussed counseling and relaxation techniques to help with anxiety and stress    Medications:    1. Naproxin (Aleve): 220mg 1-2 tablets twice a day PRN. 2. Acetaminophen (Tylenol):  500mg 1-2 tablets every 6 hours as needed for pain.     RTC: PRN

## 2022-09-02 ENCOUNTER — OFFICE VISIT (OUTPATIENT)
Dept: FAMILY MEDICINE CLINIC | Age: 32
End: 2022-09-02

## 2022-09-02 VITALS
TEMPERATURE: 97.5 F | WEIGHT: 139.6 LBS | DIASTOLIC BLOOD PRESSURE: 71 MMHG | OXYGEN SATURATION: 99 % | SYSTOLIC BLOOD PRESSURE: 110 MMHG | RESPIRATION RATE: 18 BRPM | HEIGHT: 64 IN | HEART RATE: 93 BPM | BODY MASS INDEX: 23.83 KG/M2

## 2022-09-02 DIAGNOSIS — M54.2 NECK PAIN: ICD-10-CM

## 2022-09-02 DIAGNOSIS — M62.838 TRAPEZIUS MUSCLE SPASM: Primary | ICD-10-CM

## 2022-09-02 DIAGNOSIS — M54.9 UPPER BACK PAIN: ICD-10-CM

## 2022-09-02 PROCEDURE — 99214 OFFICE O/P EST MOD 30 MIN: CPT | Performed by: STUDENT IN AN ORGANIZED HEALTH CARE EDUCATION/TRAINING PROGRAM

## 2022-09-02 RX ORDER — LINACLOTIDE 72 UG/1
72 CAPSULE, GELATIN COATED ORAL DAILY
COMMUNITY
Start: 2022-08-15

## 2022-09-02 RX ORDER — AZELASTINE 1 MG/ML
1 SPRAY, METERED NASAL
COMMUNITY
Start: 2022-08-23

## 2022-09-02 RX ORDER — METHOCARBAMOL 750 MG/1
750 TABLET, FILM COATED ORAL
Qty: 30 TABLET | Refills: 0 | Status: SHIPPED | OUTPATIENT
Start: 2022-09-02 | End: 2022-09-12

## 2022-09-02 RX ORDER — BUPROPION HYDROCHLORIDE 150 MG/1
150 TABLET ORAL DAILY
COMMUNITY
Start: 2022-08-23

## 2022-09-02 RX ORDER — DICYCLOMINE HYDROCHLORIDE 10 MG/1
10 CAPSULE ORAL
COMMUNITY
Start: 2022-08-15

## 2022-09-02 RX ORDER — METHYLPREDNISOLONE 4 MG/1
TABLET ORAL
Qty: 1 DOSE PACK | Refills: 0 | Status: SHIPPED | OUTPATIENT
Start: 2022-09-02 | End: 2022-09-29

## 2022-09-02 RX ORDER — NORETHINDRONE ACETATE AND ETHINYL ESTRADIOL AND FERROUS FUMARATE 1MG-20(21)
1 KIT ORAL DAILY
COMMUNITY
Start: 2022-06-22

## 2022-09-02 NOTE — PROGRESS NOTES
[address] [place] [address]   Office [phone], Fax [phone]    Subjective:     Chief Complaint   Patient presents with    Back Pain     Upper back and neck pain. States pain feels dull and her neck feels stiff. Aggravated at work. Has been doing exercises and heat which hasnt helped     History provided by patient     Jessi Rubin is a 33 y.o. female complains of pain in the bilateral upper back/neck pain for 1-2 months. No injury or trauma. Pain is intermittent. Worse with activities and worse at work and improves with rest. No radicular sx. No cp/sob. H/o anxiety and sx seem worse when she is stressed and anxious. She has tried tylenol and HEP with minimal relief. NSAIDs aggravate her reflux so she tries to avoid them. She has been evaluated in urgent care and given muscle relaxer and steroid that helped while she was taking them but then symptoms returned    Medication reviewed. Current Outpatient Medications:     albuterol (PROVENTIL HFA, VENTOLIN HFA, PROAIR HFA) 90 mcg/actuation inhaler, INHALE 2 PUFFS BY MOUTH EVERY 4 HOURS AS NEEDED FOR WHEEZING, Disp: 6.7 Each, Rfl: 2    amphetamine-dextroamphetamine XR (ADDERALL XR) 20 mg XR capsule, Take 20 mg by mouth daily. , Disp: , Rfl:     zolpidem (AMBIEN) 5 mg tablet, Take  by mouth daily. , Disp: , Rfl:     EPINEPHrine (EPIPEN) 0.3 mg/0.3 mL injection, , Disp: , Rfl:     lansoprazole (PREVACID) 15 mg capsule, TAKE 1 CAPSULE BY MOUTH EVERY DAY, Disp: , Rfl:      Allergy reviewed.   Allergies   Allergen Reactions    Shellfish Derived Hives     Not allergic    Tape [Adhesive] Contact Dermatitis, Itching, Rash and Swelling        Past Medical History:   Diagnosis Date    Asthma     Carpal tunnel syndrome     bilateral    Cro[address]'s disease ([facility] [facility]     Environmental and seasonal allergies     GERD (gastroesophageal reflux disease)     Ill-defined condition     crohn's    Migraines     Psychiatric disorder     anxiety    PUD (peptic ulcer disease)     Vertigo        Social History     Socioeconomic History    Marital status: SINGLE   Tobacco Use    Smoking status: Former     Types: Cigarettes     Quit date: 4/17/2019     Years since quitting: 3.3    Smokeless tobacco: Never   Substance and Sexual Activity    Alcohol use: Yes     Alcohol/week: 1.0 standard drink     Types: 1 Glasses of wine per week    Drug use: No    Sexual activity: Yes     Partners: Male       Review of Systems   Constitutional:  Negative for chills and fever. HENT:  Negative for congestion and sore throat. Respiratory:  Negative for cough and shortness of breath. Cardiovascular:  Negative for chest pain and palpitations. Musculoskeletal:  Positive for back pain and neck pain. Neurological:  Negative for dizziness, tingling, sensory change, focal weakness and headaches. Psychiatric/Behavioral:  Negative for depression. The patient is nervous/anxious. Objective:   Vitals - reviewed  Visit Vitals  /71 (BP 1 Location: Left upper arm, BP Patient Position: Sitting, BP Cuff Size: Adult)   Pulse 93   Temp 97.5 °F (36.4 °C) (Temporal)   Resp 18   Ht 5' 4\" (1.626 m)   Wt 139 lb 9.6 oz (63.3 kg)   SpO2 99%   BMI 23.96 kg/m²        Physical exam:   GEN: Well appearing. No apparent distress. Responds to all questions appropriately. Lungs: No labored respirations. Talking in complete sentences without difficulty.   Shoulder: bilateral  Deformity: None     ROM:  Forward Flexion: Active: 180                ER (0): Active: 45          IR (0): Active: Behind the body to the level T5  Abduction: Active: 180                   Palpation:  AC tenderness: None  SC tenderness: None  Clavicle tenderness: None  Biceps tenderness: None  Trapezius tenderness: Generalized, but no identifiable trigger points     Strength (0-5/5):  Deltoid - Anterior: 5/5  Deltoid - Posterior: 5/5  Deltoid - Mid: 5/5  Supraspinatus: 5/5  External rotation: 5/5  Internal rotation: 5/5 Neuro/Vascular:  Pulses intact, no edema, and neurologically intact     C-Spine:  Cervical motion: FROM without pain. Cervical tenderness: None     Skin: No obvious rash or skin breakdown. Assessment and orders:       ICD-10-CM ICD-9-CM    1. Trapezius muscle spasm  M62.838 728.85 methylPREDNISolone (MEDROL DOSEPACK) 4 mg tablet      methocarbamoL (ROBAXIN) 750 mg tablet      REFERRAL TO PHYSICAL THERAPY      2. Neck pain  M54.2 723.1 methylPREDNISolone (MEDROL DOSEPACK) 4 mg tablet      methocarbamoL (ROBAXIN) 750 mg tablet      REFERRAL TO PHYSICAL THERAPY      3. Upper back pain  M54.9 724.5 methylPREDNISolone (MEDROL DOSEPACK) 4 mg tablet      methocarbamoL (ROBAXIN) 750 mg tablet      REFERRAL TO PHYSICAL THERAPY        Upper neck pain: Likely muscular and related to stress  - Will trial one more steroid dosepack since patient cannot tolerate NSAIDs as well as muscle relaxant and referral to PT. Discussed that steroids may help pain briefly but treatment for this process will be best achieved with therapy. We also discussed this may take 4-6 weeks to fully resolve. - Demonstrated trapezius stretch for patient to do at home  - May consider celebrex for NSAID in the future if needed      Follow up in 4-6 weeks if no improvement    Pt was discussed with Dr Black Marquez (attending physician). I have reviewed patient medical and social history and medications. I have reviewed pertinent labs results and other data. I have discussed the diagnosis with the patient and the intended plan as seen in the above orders. The patient has received an after-visit summary and questions were answered concerning future plans. I have discussed medication side effects and warnings with the patient as well.     Del Clemens DO  Resident [facility]  09/02/22

## 2022-09-02 NOTE — PROGRESS NOTES
Red Henao is a 33 y.o. female    Chief Complaint   Patient presents with    Back Pain     Upper back and neck pain. States pain feels dull and her neck feels stiff. Aggravated at work. Has been doing exercises and heat which hasnt helped       Visit Vitals  /71 (BP 1 Location: Left upper arm, BP Patient Position: Sitting, BP Cuff Size: Adult)   Pulse 93   Temp 97.5 °F (36.4 °C) (Temporal)   Resp 18   Ht 5' 4\" (1.626 m)   Wt 139 lb 9.6 oz (63.3 kg)   SpO2 99%   BMI 23.96 kg/m²       1. \"Have you been to the ER, urgent care clinic since your last visit? Hospitalized since your last visit? \" No    2. \"Have you seen or consulted any other health care providers outside of the [address] [place] [address] since your last visit? \" No     3. For patients aged 39-70: Has the patient had a colonoscopy / FIT/ Cologuard? NA - based on age      If the patient is female:    4. For patients aged 41-77: Has the patient had a mammogram within the past 2 years? NA - based on age or sex      11. For patients aged 21-65: Has the patient had a pap smear? Yes - Care Gap present. Most recent result on file      Health Maintenance Due   Topic Date Due    Pneumococcal 0-64 years (1 - PCV) Never done    Cervical cancer screen  01/18/2021    Flu Vaccine (1) 09/01/2022         Medication Reconciliation completed, changes noted.   Please update medication list.

## 2022-09-06 NOTE — PROGRESS NOTES
I discussed the findings, assessment and plan with the resident and agree with the resident's findings and plan as documented in the resident's note. FRANCISCO James

## 2022-09-13 ENCOUNTER — NURSE TRIAGE (OUTPATIENT)
Dept: OTHER | Facility: CLINIC | Age: 32
End: 2022-09-13

## 2022-09-13 NOTE — TELEPHONE ENCOUNTER
Received call from [facility] at [facility] with Red Flag Complaint. Subjective: Caller states \"They started a couple months ago. I believe it may have been July or August. It's very sharp when I inhale. I have to take short shallow breaths. Typically I get seen for it and then I'm given a muscle relaxer and a type of steroid and then it goes away and then it comes back. It's been a couple months with the same thing. \"     Current Symptoms: shoulder blade pain-WORST and chest pain with breathing/movement, upper back pain, shallow breathing d/t pain    Patient has been seen recently for upper back pain and is doing physical therapy    Hx of chest pain and Pleurisy on going x a few months    Onset: 3 days ago; worsening    Associated Symptoms: reduced activity    Pain Severity: 10/10; sharp; severe with movement/inhaling    Temperature: Denies    What has been tried: Tylenol, Muscle relaxers, Methylprednisone-helps, taking shallow breaths    LMP:  Last week- BCP's Pregnant: No    Recommended disposition: Go to ED/[facility] Now (Or to Office with PCP Approval). Writer unable to reach [facility] x multiple attempts. [facility] also unable to reach office. Writer advised patient to be seen at ED Now. Patient states she will go to [facility] Now. Care advice provided, patient verbalizes understanding; denies any other questions or concerns; instructed to call back for any new or worsening symptoms. Patient/caller agrees to proceed to nearest [facility] [place]    Attention Provider: Thank you for allowing me to participate in the care of your patient. The patient was connected to triage in response to information provided to the E[facility]. Please do not respond through this encounter as the response is not directed to a shared pool.       Reason for Disposition   Patient sounds very sick or weak to the triager    Protocols used: Back Pain-ADULT-[place]

## 2022-09-20 ENCOUNTER — TELEPHONE (OUTPATIENT)
Dept: FAMILY MEDICINE CLINIC | Age: 32
End: 2022-09-20

## 2022-09-20 NOTE — TELEPHONE ENCOUNTER
Patient came in she was scheduled for a physical but was late for the appointment. Let patient know that she can still have an appointment but would be for an acute problem I also offered her an eveing appointment at 2:20 but patient declined. Patient stated that she was on the phone with ECC and they could not get a hold of us. Patient stated she needed reasonable commendation forms filled out for her job. Dr. Alexandra Jane agreed to fill out forms patient also stated she also has stomach issues as well and needed those forms to address that as well. I scheduled her an appointment with another doctor to address her concerns.  Patient also kept her appointment for her physical.

## 2022-09-21 NOTE — PROGRESS NOTES
Subjective   CC:  Billie Goodwin is a 33 y.o. female who presents for follow up of back pain and to discuss paperwork. Back pain:   - Patient has been dealing with left upper back pain since mid-July  - Pain is worse with deep inspiration and she feels like she has to take shallow breaths due to this  - She has previously been seen by the sports medicine clinic for this  - She has previously tried taking Tylenol, naproxen, ibuprofen, and most recently a course of Robaxin and Medrol dosepack without relief of symptoms  - She is currently going to physical therapy twice a week and feels like her pain has been worse since then  - She denies any specific injury, trauma, or fall prior to onset of the pain  - She denies any chest pain, shortness of breath, coughing, fever, or chills  - She reports that she was seen at an urgent care center and at [facility] ED for these symptoms and had a normal chest x-ray and normal blood work to evaluate for blood clot  - She works as a pharmacy technician and has asked Dr. Hadley Jackson to fill out paperwork for work accommodations due to her back pain and left-sided carpal tunnel syndrome. She is hoping to have the paperwork updated to lengthen the duration of time that her accommodations will apply given continued symptoms.  - Patient is also requesting a new referral to physical therapy for treatment of chronic carpal tunnel syndrome    Review of Systems   Constitutional:  Negative for appetite change, fever and unexpected weight change. Respiratory:  Negative for cough and shortness of breath. Cardiovascular:  Negative for chest pain and leg swelling. Gastrointestinal:  Negative for nausea and vomiting. Musculoskeletal:  Positive for back pain and myalgias. Skin:  Negative for rash. Neurological:  Negative for dizziness, weakness, light-headedness, numbness and headaches.      Past Medical History  Past Medical History:   Diagnosis Date    Asthma     Carpal tunnel syndrome     bilateral    Crohn's disease ([facility] [address]     Environmental and seasonal allergies     GERD (gastroesophageal reflux disease)     Ill-defined condition     crohn's    Migraines     Psychiatric disorder     anxiety    PUD (peptic ulcer disease)     Vertigo        Current Medications  Current Outpatient Medications   Medication Sig Dispense Refill    azelastine (ASTELIN) 137 mcg (0.1 %) nasal spray 1 Thorp by Both Nostrils route two (2) times daily as needed. buPROPion XL (WELLBUTRIN XL) 150 mg tablet Take 150 mg by mouth daily. dicyclomine (BENTYL) 10 mg capsule Take 10 mg by mouth every six (6) hours as needed. Linzess 72 mcg cap capsule Take 72 mcg by mouth daily. albuterol (PROVENTIL HFA, VENTOLIN HFA, PROAIR HFA) 90 mcg/actuation inhaler INHALE 2 PUFFS BY MOUTH EVERY 4 HOURS AS NEEDED FOR WHEEZING 6.7 Each 2    amphetamine-dextroamphetamine XR (ADDERALL XR) 20 mg XR capsule Take 20 mg by mouth daily. zolpidem (AMBIEN) 5 mg tablet Take  by mouth daily. EPINEPHrine (EPIPEN) 0.3 mg/0.3 mL injection       lansoprazole (PREVACID) 15 mg capsule TAKE 1 CAPSULE BY MOUTH EVERY DAY      methylPREDNISolone (MEDROL DOSEPACK) 4 mg tablet Take as directed on dosepack (Patient not taking: Reported on 9/22/2022) 1 Dose Pack 0    Junel FE 1/20, 28, 1 mg-20 mcg (21)/75 mg (7) tab Take 1 Tablet by mouth daily. Allergies  Allergies   Allergen Reactions    Tape [Adhesive] Contact Dermatitis, Itching, Rash and Swelling       Social History   Social History     Socioeconomic History    Marital status: SINGLE     Spouse name: Not on file    Number of children: Not on file    Years of education: Not on file    Highest education level: Not on file   Occupational History    Not on file   Tobacco Use    Smoking status: Former     Types: Cigarettes     Quit date: 4/17/2019     Years since quitting: 3.4    Smokeless tobacco: Never   Substance and Sexual Activity    Alcohol use:  Yes     Alcohol/week: 1.0 standard drink     Types: 1 Glasses of wine per week    Drug use: No    Sexual activity: Yes     Partners: Male   Other Topics Concern    Not on file   Social History Narrative    Not on file     Social Determinants of Health     Financial Resource Strain: Not on file   Food Insecurity: Not on file   Transportation Needs: Not on file   Physical Activity: Not on file   Stress: Not on file   Social Connections: Not on file   Intimate Partner Violence: Not on file   Housing Stability: Not on file       Family History  Family History   Problem Relation Age of Onset    Diabetes Mother     Hypertension Mother     Elevated Lipids Mother     Hypertension Father     Elevated Lipids Father        Objective   Vital Signs  Visit Vitals  /71 (BP 1 Location: Right arm, BP Patient Position: Sitting, BP Cuff Size: Adult)   Pulse 94   Temp 98.3 °F (36.8 °C) (Oral)   Resp 18   Ht 5' 4\" (1.626 m)   Wt 138 lb (62.6 kg)   SpO2 98%   BMI 23.69 kg/m²       Physical Examination  Physical Exam  Constitutional:       General: She is not in acute distress. Appearance: She is normal weight. She is not ill-appearing. Cardiovascular:      Rate and Rhythm: Normal rate and regular rhythm. Heart sounds: Normal heart sounds. No murmur heard. Pulmonary:      Effort: Pulmonary effort is normal.      Breath sounds: Normal breath sounds. Musculoskeletal:         General: Tenderness present. Comments: When in prone positions 2 areas of point tenderness were palpated over left thoracic paraspinal musculature. Full symmetric ROM in BL upper extremities. Left wrist with brace in place. Skin:     Findings: No rash. Neurological:      Mental Status: She is alert and oriented to person, place, and time. Sensory: No sensory deficit. Motor: No weakness.       [facility]  OFFICE PROCEDURE PROGRESS NOTE      Chart reviewed for the following:   Marlo LOUIS DO, have reviewed the History, Physical and updated the Allergic reactions for [address] performed immediately prior to start of procedure:   I, Vane Bowens DO, have performed the following reviews on Enedina Barrow prior to the start of the procedure:            * Patient was identified by name and date of birth   * Agreement on procedure being performed was verified  * Risks and Benefits explained to the patient  * Procedure site verified and marked as necessary  * Patient was positioned for comfort  * Consent was signed and verified     Time: 12:05 PM     Date of procedure: 9/22/2022    Procedure performed by:  Vane Bowens DO    Provider assisted by: Dominic Gama MD     Patient assisted by: self    How tolerated by patient: tolerated the procedure well with no complications    Post Procedural Pain Scale: 2 - Hurts Little Bit    Comments:     A trigger point injection was performed at the site of maximal tenderness using 3 mL 1% plain Lidocaine and 10 mg Kenalog. This was well tolerated, and followed by partial relief of pain. Assessment and Plan   Enedina Barrow is a 33 y.o. who is here for follow up of persistent L-sided upper back pain since July 2022. No red flag symptoms. She is attending PT without improvement in pain. Has tried conservative treatment with Tylenol, NSAIDs, heat, icy-hot, lidocaine patches, and 2 courses of muscle relaxant + steroid without relief. 2 trigger points identified and treated with trigger point injections on exam today with some reported improvement in pain. Continue Tylenol, heat, home stretches, and PT. Asked her to send a new copy of her work accomodation forms for me to review. Sending updated referral to PT for hx of chronic bilateral carpal tunnel (currently worse on the left) per patient request. Follow up as scheduled for well woman exam on 9/29 with Dr. Carlyle Yoo. ICD-10-CM ICD-9-CM    1. Upper back pain  M54.9 724.5       2.  Trigger point of shoulder region, left  M25.512 719.41 THER/PROPH/DIAG INJECTION, SUBCUT/IM      3. Carpal tunnel syndrome of left wrist  G56.02 354.0 REFERRAL TO PHYSICAL THERAPY           Patient is counseled to return to the office if symptoms do not improve as expected. Urgent consultation with the nearest Emergency Department is strongly recommended if condition worsens. Patient is counseled to follow up as recommended and to inform the office if any changes in treatment are recommended.       I discussed this patient with Dr. Ronak Garrison (Attending Physician)       Signed By:  Song Gillis DO  Family Medicine Resident

## 2022-09-22 ENCOUNTER — OFFICE VISIT (OUTPATIENT)
Dept: FAMILY MEDICINE CLINIC | Age: 32
End: 2022-09-22
Payer: COMMERCIAL

## 2022-09-22 VITALS
TEMPERATURE: 98.3 F | OXYGEN SATURATION: 98 % | HEART RATE: 94 BPM | HEIGHT: 64 IN | SYSTOLIC BLOOD PRESSURE: 112 MMHG | BODY MASS INDEX: 23.56 KG/M2 | RESPIRATION RATE: 18 BRPM | DIASTOLIC BLOOD PRESSURE: 71 MMHG | WEIGHT: 138 LBS

## 2022-09-22 DIAGNOSIS — G56.02 CARPAL TUNNEL SYNDROME OF LEFT WRIST: ICD-10-CM

## 2022-09-22 DIAGNOSIS — M25.512 TRIGGER POINT OF SHOULDER REGION, LEFT: ICD-10-CM

## 2022-09-22 DIAGNOSIS — M54.9 UPPER BACK PAIN: Primary | ICD-10-CM

## 2022-09-22 PROCEDURE — 99213 OFFICE O/P EST LOW 20 MIN: CPT | Performed by: STUDENT IN AN ORGANIZED HEALTH CARE EDUCATION/TRAINING PROGRAM

## 2022-09-22 NOTE — PROGRESS NOTES
Identified Patient with two Patient identifiers (Name and ). Two Patient Identifiers confirmed. Reviewed record in preparation for visit and have obtained necessary documentation. Chief Complaint   Patient presents with    Form Completion     Patient states she is here today to have paperwork completed that Dr. Adelina Bolanos started. Visit Vitals  /71 (BP 1 Location: Right arm, BP Patient Position: Sitting, BP Cuff Size: Adult)   Pulse 94   Temp 98.3 °F (36.8 °C) (Oral)   Resp 18   Ht 5' 4\" (1.626 m)   Wt 138 lb (62.6 kg)   SpO2 98%   BMI 23.69 kg/m²       1. Have you been to the ER, urgent care clinic since your last visit? Hospitalized since your last visit? No    2. Have you seen or consulted any other health care providers outside of the [address] [place] [address] since your last visit? Include any pap smears or colon screening.  No

## 2022-09-29 ENCOUNTER — OFFICE VISIT (OUTPATIENT)
Dept: FAMILY MEDICINE CLINIC | Age: 32
End: 2022-09-29
Payer: COMMERCIAL

## 2022-09-29 VITALS
BODY MASS INDEX: 23.86 KG/M2 | OXYGEN SATURATION: 100 % | SYSTOLIC BLOOD PRESSURE: 120 MMHG | WEIGHT: 139 LBS | DIASTOLIC BLOOD PRESSURE: 86 MMHG | HEART RATE: 84 BPM

## 2022-09-29 DIAGNOSIS — Z13.1 SCREENING FOR DIABETES MELLITUS: ICD-10-CM

## 2022-09-29 DIAGNOSIS — Z13.220 SCREENING FOR LIPID DISORDERS: ICD-10-CM

## 2022-09-29 DIAGNOSIS — M54.9 UPPER BACK PAIN: Primary | ICD-10-CM

## 2022-09-29 DIAGNOSIS — M54.2 NECK PAIN: ICD-10-CM

## 2022-09-29 DIAGNOSIS — F32.A ANXIETY AND DEPRESSION: ICD-10-CM

## 2022-09-29 DIAGNOSIS — F41.9 ANXIETY AND DEPRESSION: ICD-10-CM

## 2022-09-29 DIAGNOSIS — M54.9 UPPER BACK PAIN: ICD-10-CM

## 2022-09-29 DIAGNOSIS — R53.82 CHRONIC FATIGUE: ICD-10-CM

## 2022-09-29 DIAGNOSIS — Z00.00 WELL WOMAN EXAM (NO GYNECOLOGICAL EXAM): ICD-10-CM

## 2022-09-29 DIAGNOSIS — M62.838 TRAPEZIUS MUSCLE SPASM: ICD-10-CM

## 2022-09-29 PROCEDURE — 99214 OFFICE O/P EST MOD 30 MIN: CPT

## 2022-09-29 RX ORDER — ONDANSETRON 4 MG/1
TABLET, ORALLY DISINTEGRATING ORAL
COMMUNITY
Start: 2022-09-20

## 2022-09-29 RX ORDER — SERTRALINE HYDROCHLORIDE 100 MG/1
TABLET, FILM COATED ORAL
COMMUNITY
Start: 2022-09-19

## 2022-09-29 RX ORDER — METHOCARBAMOL 500 MG/1
TABLET, FILM COATED ORAL
COMMUNITY
Start: 2022-09-13

## 2022-09-29 RX ORDER — OLOPATADINE HYDROCHLORIDE 1 MG/ML
SOLUTION/ DROPS OPHTHALMIC
COMMUNITY
Start: 2022-08-23

## 2022-09-29 RX ORDER — DEXTROAMPHETAMINE SACCHARATE, AMPHETAMINE ASPARTATE, DEXTROAMPHETAMINE SULFATE AND AMPHETAMINE SULFATE 5; 5; 5; 5 MG/1; MG/1; MG/1; MG/1
20 TABLET ORAL AS NEEDED
COMMUNITY

## 2022-09-29 NOTE — PROGRESS NOTES
HPI:  Enedina Barrow is a 33 y.o. female presenting for physical exam. Noted to have a past medical history of Crohn's disease, back pain, anxiety. Back pain: Ongoing since 07/2022. No injury/trauma, no red flag symptoms. Attending PT, this has been helpful. Got trigger point injection at last visit on 9/22. Had some relief with this. Has not had relief with Robaxin, Medrol dose pack or other pain meds. Anxiety/depressed mood: sees psychiatrist. Has been very fatigued lately. Lack of interest in doing previous hobbies. Excessive sleep. Medications are managed by psychiatrist and pt reports she has an appointment coming up. Also does weekly therapy sessions and finds this helpful. Follows with GYN. Pap smear scheduled for next month. Requesting labs today. Wants thyroid, A1c, cholesterol, blood count, and vitamin levels checked to investigate alternative cause for her fatigue and muscle spasms. Immunizations - reviewed:   Immunization History   Administered Date(s) Administered    COVID-19, PFIZER PURPLE top, DILUTE for use, (age 15 y+), IM, 30mcg/0.3mL 06/11/2021, 07/02/2021, 01/18/2022    Influenza Vaccine 10/11/2021    Rho(D) Immune Globulin - IV Or IM 02/14/2020    Tdap 02/15/2020         Allergies- reviewed:    Allergies   Allergen Reactions    Tape [Adhesive] Contact Dermatitis, Itching, Rash and Swelling         Medications- reviewed:   Current Outpatient Medications   Medication Sig    methocarbamoL (ROBAXIN) 500 mg tablet TAKE 2 TABLETS BY MOUTH 4 TIMES A DAY AS NEEDED FOR MUSCLE SPAMS    olopatadine (PATANOL) 0.1 % ophthalmic solution 1 DROP IN EACH EYE EVERY 12 HOURS AS NEEDED FOR ITCHY OR WATERY EYES.    ondansetron (ZOFRAN ODT) 4 mg disintegrating tablet TAKE 1 TABLET BY MOUTH EVERY SIX HOURS AS NEEDED FOR NAUSEA FOR 30 DAYS    sertraline (ZOLOFT) 100 mg tablet TAKE 1 AND 1/2 TABLET BY MOUTH EVERY DAY    dextroamphetamine-amphetamine (AdderalL) 20 mg tablet Take 20 mg by mouth as needed. Take 1 ever day in the afternoon    azelastine (ASTELIN) 137 mcg (0.1 %) nasal spray 1 Elim by Both Nostrils route two (2) times daily as needed. buPROPion XL (WELLBUTRIN XL) 150 mg tablet Take 150 mg by mouth daily. amphetamine-dextroamphetamine XR (ADDERALL XR) 20 mg XR capsule Take 20 mg by mouth daily. zolpidem (AMBIEN) 5 mg tablet Take  by mouth daily. EPINEPHrine (EPIPEN) 0.3 mg/0.3 mL injection     lansoprazole (PREVACID) 15 mg capsule TAKE 1 CAPSULE BY MOUTH EVERY DAY    dicyclomine (BENTYL) 10 mg capsule Take 10 mg by mouth every six (6) hours as needed. Linzess 72 mcg cap capsule Take 72 mcg by mouth daily. (Patient not taking: Reported on 2022)    Junel FE 1/20, 28, 1 mg-20 mcg (21)/75 mg (7) tab Take 1 Tablet by mouth daily. (Patient not taking: Reported on 2022)    albuterol (PROVENTIL HFA, VENTOLIN HFA, PROAIR HFA) 90 mcg/actuation inhaler INHALE 2 PUFFS BY MOUTH EVERY 4 HOURS AS NEEDED FOR WHEEZING     No current facility-administered medications for this visit.          Past Medical History- reviewed:  Past Medical History:   Diagnosis Date    Asthma     Carpal tunnel syndrome     bilateral    Crohn's disease ([address]     Environmental and seasonal allergies     GERD (gastroesophageal reflux disease)     Ill-defined condition     crohn's    Migraines     Psychiatric disorder     anxiety    PUD (peptic ulcer disease)     Vertigo          Past Surgical History- reviewed:   Past Surgical History:   Procedure Laterality Date    COLONOSCOPY N/A 2019    COLONOSCOPY performed by Adin Ulloa MD at [facility] END[facility]COPY    HX  SECTION      HX OVARIAN CYST REMOVAL      HX TONSILLECTOMY           Family History - reviewed:  Family History   Problem Relation Age of Onset    Diabetes Mother     Hypertension Mother     Elevated Lipids Mother     Hypertension Father     Elevated Lipids Father          Social History - reviewed:  Social History     Socioeconomic History Marital status: SINGLE     Spouse name: Not on file    Number of children: Not on file    Years of education: Not on file    Highest education level: Not on file   Occupational History    Not on file   Tobacco Use    Smoking status: Former     Types: Cigarettes     Quit date: 4/17/2019     Years since quitting: 3.4    Smokeless tobacco: Never   Substance and Sexual Activity    Alcohol use: Yes     Alcohol/week: 1.0 standard drink     Types: 1 Glasses of wine per week    Drug use: No    Sexual activity: Yes     Partners: Male   Other Topics Concern    Not on file   Social History Narrative    Not on file     Social Determinants of Health     Financial Resource Strain: Not on file   Food Insecurity: Not on file   Transportation Needs: Not on file   Physical Activity: Not on file   Stress: Not on file   Social Connections: Not on file   Intimate Partner Violence: Not on file   Housing Stability: Not on file           Review of Systems   Respiratory:  Negative for shortness of breath. Cardiovascular:  Negative for chest pain. Gastrointestinal:  Negative for abdominal pain. Musculoskeletal:  Positive for back pain. Psychiatric/Behavioral:  Negative for depression and suicidal ideas. The patient is nervous/anxious.           Physical Exam  Visit Vitals  /86 (BP 1 Location: Right upper arm, BP Patient Position: Sitting, BP Cuff Size: Adult)   Pulse 84   Wt 63 kg (139 lb)   SpO2 100%   BMI 23.86 kg/m²       General appearance - alert, well appearing, and in no distress  Chest - clear to auscultation, no wheezes, rales or rhonchi, symmetric air entry  Heart - normal rate, regular rhythm, normal S1, S2, no murmurs, rubs, clicks or gallops  Abdomen - soft, nontender, nondistended, no masses or organomegaly  Neurological - alert, oriented, normal speech, no focal findings or movement disorder noted  Musculoskeletal - no joint tenderness, deformity or swelling  Extremities - no pedal edema noted  Skin - normal coloration and turgor, no rashes, no suspicious skin lesions noted        Assessment/Plan:    1. Upper back pain  Previously has found relief with trigger point injections and PT. Advised to continue PT and RTC for trigger point injections. Can continue to use motrin/ tylenol, ice/head, Robaxin as needed. Patient requesting alternative medications but will wait for labs to result. May consider short term Flexeril (1-2 wk supply) but would prefer pain management option if patient continues to need medication. Given handout on back spasm exercises/stretches. 2. Well woman exam (no gynecological exam)    3. Anxiety and depression  Symptoms appear uncontrolled. Medication and therapy being managed by psychiatrist at present. Currently on Zoloft and Wellbutrin. Initially medications were working well, but pt feels she has reached a plateau. Seeing psychiatrist within the week per patient. No SI/HI. 4. Chronic fatigue  - FOLATE  - CBC WITH AUTOMATED DIFF  - VITAMIN B12  - VITAMIN D, 25 HYDROXY  - METABOLIC PANEL, BASIC  - TSH 3RD GENERATION    5. Screening for lipid disorders  - LIPID PANEL    6. Screening for diabetes mellitus  - HEMOGLOBIN A1C WITH EAG        Follow-up and Dispositions    Return in about 2 months (around 11/29/2022) for trigger point injection . I have discussed the diagnosis with the patient and the intended plan as seen in the above orders. The patient has received an after-visit summary and questions were answered concerning future plans. I have discussed medication side effects and warnings with the patient as well. Informed pt to return to the office if new symptoms arise.     Patient discussed with Dr. Jayne Mike (attending)      Jez Weaver DO

## 2022-09-30 LAB
25(OH)D3 SERPL-MCNC: 19.9 NG/ML (ref 30–100)
ANION GAP SERPL CALC-SCNC: 5 MMOL/L (ref 5–15)
BASOPHILS # BLD: 0.1 K/UL (ref 0–0.1)
BASOPHILS NFR BLD: 1 % (ref 0–1)
BUN SERPL-MCNC: 12 MG/DL (ref 6–20)
BUN/CREAT SERPL: 15 (ref 12–20)
CALCIUM SERPL-MCNC: 9.1 MG/DL (ref 8.5–10.1)
CHLORIDE SERPL-SCNC: 105 MMOL/L (ref 97–108)
CHOLEST SERPL-MCNC: 171 MG/DL
CO2 SERPL-SCNC: 28 MMOL/L (ref 21–32)
CREAT SERPL-MCNC: 0.78 MG/DL (ref 0.55–1.02)
DIFFERENTIAL METHOD BLD: NORMAL
EOSINOPHIL # BLD: 0.1 K/UL (ref 0–0.4)
EOSINOPHIL NFR BLD: 2 % (ref 0–7)
ERYTHROCYTE [DISTWIDTH] IN BLOOD BY AUTOMATED COUNT: 14.3 % (ref 11.5–14.5)
EST. AVERAGE GLUCOSE BLD GHB EST-MCNC: 114 MG/DL
FOLATE SERPL-MCNC: 4.4 NG/ML (ref 5–21)
GLUCOSE SERPL-MCNC: 104 MG/DL (ref 65–100)
HBA1C MFR BLD: 5.6 % (ref 4–5.6)
HCT VFR BLD AUTO: 41.8 % (ref 35–47)
HDLC SERPL-MCNC: 65 MG/DL
HDLC SERPL: 2.6 {RATIO} (ref 0–5)
HGB BLD-MCNC: 13.3 G/DL (ref 11.5–16)
IMM GRANULOCYTES # BLD AUTO: 0 K/UL (ref 0–0.04)
IMM GRANULOCYTES NFR BLD AUTO: 0 % (ref 0–0.5)
LDLC SERPL CALC-MCNC: 96.6 MG/DL (ref 0–100)
LYMPHOCYTES # BLD: 1.6 K/UL (ref 0.8–3.5)
LYMPHOCYTES NFR BLD: 23 % (ref 12–49)
MCH RBC QN AUTO: 28.3 PG (ref 26–34)
MCHC RBC AUTO-ENTMCNC: 31.8 G/DL (ref 30–36.5)
MCV RBC AUTO: 88.9 FL (ref 80–99)
MONOCYTES # BLD: 0.3 K/UL (ref 0–1)
MONOCYTES NFR BLD: 5 % (ref 5–13)
NEUTS SEG # BLD: 4.6 K/UL (ref 1.8–8)
NEUTS SEG NFR BLD: 69 % (ref 32–75)
NRBC # BLD: 0 K/UL (ref 0–0.01)
NRBC BLD-RTO: 0 PER 100 WBC
PLATELET # BLD AUTO: 292 K/UL (ref 150–400)
PMV BLD AUTO: 9.5 FL (ref 8.9–12.9)
POTASSIUM SERPL-SCNC: 4.3 MMOL/L (ref 3.5–5.1)
RBC # BLD AUTO: 4.7 M/UL (ref 3.8–5.2)
SODIUM SERPL-SCNC: 138 MMOL/L (ref 136–145)
TRIGL SERPL-MCNC: 47 MG/DL (ref ?–150)
TSH SERPL DL<=0.05 MIU/L-ACNC: 0.83 UIU/ML (ref 0.36–3.74)
VIT B12 SERPL-MCNC: 442 PG/ML (ref 193–986)
VLDLC SERPL CALC-MCNC: 9.4 MG/DL
WBC # BLD AUTO: 6.7 K/UL (ref 3.6–11)

## 2022-09-30 RX ORDER — METHOCARBAMOL 750 MG/1
TABLET, FILM COATED ORAL
Qty: 30 TABLET | Refills: 0 | OUTPATIENT
Start: 2022-09-30

## 2022-10-05 DIAGNOSIS — M62.838 TRAPEZIUS MUSCLE SPASM: Primary | ICD-10-CM

## 2022-10-05 DIAGNOSIS — M54.9 UPPER BACK PAIN: ICD-10-CM

## 2022-10-05 DIAGNOSIS — M54.2 NECK PAIN: ICD-10-CM

## 2022-10-05 RX ORDER — CELECOXIB 100 MG/1
100 CAPSULE ORAL 2 TIMES DAILY
Qty: 60 CAPSULE | Refills: 2 | Status: SHIPPED | OUTPATIENT
Start: 2022-10-05 | End: 2023-01-03

## 2022-10-16 DIAGNOSIS — J45.40 MODERATE PERSISTENT ASTHMA WITHOUT COMPLICATION: ICD-10-CM

## 2022-10-17 RX ORDER — ALBUTEROL SULFATE 90 UG/1
AEROSOL, METERED RESPIRATORY (INHALATION)
Qty: 18 EACH | Refills: 2 | Status: SHIPPED | OUTPATIENT
Start: 2022-10-17

## 2022-10-25 ENCOUNTER — VIRTUAL VISIT (OUTPATIENT)
Dept: FAMILY MEDICINE CLINIC | Age: 32
End: 2022-10-25
Payer: COMMERCIAL

## 2022-10-25 DIAGNOSIS — J45.30 MILD PERSISTENT ASTHMA WITHOUT COMPLICATION: Primary | ICD-10-CM

## 2022-10-25 PROCEDURE — 99214 OFFICE O/P EST MOD 30 MIN: CPT | Performed by: STUDENT IN AN ORGANIZED HEALTH CARE EDUCATION/TRAINING PROGRAM

## 2022-10-25 NOTE — PROGRESS NOTES
Marisela Maya  33 y.o. female  1990  [address] [facility] [place]  [id number]   [address]:    Telemedicine Progress Note  Gamaliel Carolina MD       Encounter Date and Time: October 25, 2022 at 3:04 PM    Consent: Marisela Maya, who was seen by synchronous (real-time) audio-video technology, and/or her healthcare decision maker, is aware that this patient-initiated, Telehealth encounter on 10/25/2022 is a billable service, with coverage as determined by her insurance carrier. She is aware that she may receive a bill and has provided verbal consent to proceed: Yes. Chief Complaint   Patient presents with    Shortness of Breath     History of Present Illness   Marisela Maya is a 33 y.o. female was evaluated by synchronous (real-time) audio-video technology from home, through a secure patient portal.    Tested positive for Covid on 10/16. Feels like she has been getting overall better. Fatigue and shortness of breath for about 10 days, still lingering. Dry cough with tickle in back of throat. Albuterol helps. Was given Medrol dose pack from Urgent Care, which has helped some too. Also using mucinex, which helps. Short of breath with normal daily activities. Using inhaler about 4-5 times per week now. Not waking up at night for SOB. Review of Systems   Review of Systems   Constitutional:  Positive for malaise/fatigue. Negative for chills and fever. HENT:  Negative for congestion and sore throat. Eyes:  Negative for discharge and redness. Respiratory:  Positive for cough and shortness of breath. Cardiovascular:  Negative for chest pain and palpitations. Gastrointestinal:  Negative for diarrhea and vomiting. Genitourinary:  Negative for dysuria and frequency. Musculoskeletal:  Negative for falls and myalgias. Skin:  Negative for itching and rash. Neurological:  Negative for focal weakness and seizures.      Vitals/Objective:     General: alert, cooperative, no distress Mental  status: mental status: alert, oriented to person, place, and time, normal mood, behavior, speech, dress, motor activity, and thought processes   Resp: resp: normal effort and no respiratory distress   Neuro: neuro: no gross deficits   Skin: skin: no discoloration or lesions of concern on visible areas   Due to this being a TeleHealth evaluation, many elements of the physical examination are unable to be assessed. Assessment and Plan:   Time-based coding, delete if not needed: I spent at least 15 minutes with this established patient, and >50% of the time was spent counseling and/or coordinating care regarding fatigue and shortness of breath    1. Mild persistent asthma without complication  Patient is still dealing with effects from prior COVID infection 2 weeks ago. Counseled on duration of viral symptoms, though she feels she is overall improving. We will start ICS but sounds like her asthma has been worsened by this illness, possibly temporarily.  - budesonide (PULMICORT) 180 mcg/actuation aepb inhaler; Take 1 Puff by inhalation daily. Dispense: 1 Each; Refill: 1    She will also need to follow-up for evaluation for an anxiolytic for an upcoming dental procedure, for which she has PTSD due to prior hospitalization after dental procedure. Time spent in direct conversation with the patient to include medical condition(s) discussed, assessment and treatment plan:    We discussed the expected course, resolution and complications of the diagnosis(es) in detail. Medication risks, benefits, costs, interactions, and alternatives were discussed as indicated. I advised her to contact the office if her condition worsens, changes or fails to improve as anticipated. She expressed understanding with the diagnosis(es) and plan. Patient understands that this encounter was a temporary measure, and the importance of further follow up and examination was emphasized. Patient verbalized understanding. Patient informed to follow up: Follow-up and Dispositions    Return in about 2 weeks (around 2022), or if symptoms worsen or fail to improve. Electronically Signed: Amrit Mendieta MD    CPT Codes 08444-65988 for Established Patients may apply to this Telehealth Visit. POS code: 18. Chetan Aguilar is a 33 y.o. female who was evaluated by an audio-video encounter for concerns as above. Patient identification was verified prior to start of the visit. A caregiver was present when appropriate. Due to this being a TeleHealth encounter (During IYDAL-80 public health emergency), evaluation of the following organ systems was limited: Vitals/Constitutional/EENT/Resp/CV/GI//MS/Neuro/Skin/Heme-Lymph-Imm. Pursuant to the emergency declaration under the [facility], [facility] waiver authority and the [employer] and [employer] Act, this Virtual Visit was conducted, with patient's (and/or legal guardian's) consent, to reduce the patient's risk of exposure to COVID-19 and provide necessary medical care. Services were provided through a synchronous discussion virtually to substitute for in-person clinic visit. I was at home. The patient was at home. History   Patients past medical, surgical and family histories were reviewed and updated.       Past Medical History:   Diagnosis Date    Asthma     Carpal tunnel syndrome     bilateral    [address]'s disease ([facility] [address]     Environmental and seasonal allergies     GERD (gastroesophageal reflux disease)     Ill-defined condition     crohn's    Migraines     Psychiatric disorder     anxiety    PUD (peptic ulcer disease)     Vertigo      Past Surgical History:   Procedure Laterality Date    COLONOSCOPY N/A 2019    COLONOSCOPY performed by Ana Maloney MD at [facility] ENDOSCOPY    HX  SECTION      HX OVARIAN CYST REMOVAL      HX TONSILLECTOMY       Family History   Problem Relation Age of Onset    Diabetes Mother     Hypertension Mother     Elevated Lipids Mother     Hypertension Father     Elevated Lipids Father      Social History     Tobacco Use    Smoking status: Former     Types: Cigarettes     Quit date: 4/17/2019     Years since quitting: 3.5    Smokeless tobacco: Never   Substance Use Topics    Alcohol use: Yes     Alcohol/week: 1.0 standard drink     Types: 1 Glasses of wine per week    Drug use: No     Patient Active Problem List   Diagnosis Code    Environmental and seasonal allergies J30.89    Crohn's disease ([address] K50.90    Asthma J45.909    GERD (gastroesophageal reflux disease) K21.9    Migraines G43.909    Vertigo R42    Anxiety and depression F41.9, F32. A    Attention deficit R41.840    PTSD (post-traumatic stress disorder) F43.10    Carpal tunnel syndrome G56.00          Current Medications/Allergies   Medications and Allergies reviewed:    Current Outpatient Medications   Medication Sig Dispense Refill    budesonide (PULMICORT) 180 mcg/actuation aepb inhaler Take 1 Puff by inhalation daily. 1 Each 1    albuterol (PROVENTIL HFA, VENTOLIN HFA, PROAIR HFA) 90 mcg/actuation inhaler TAKE 2 PUFFS BY MOUTH EVERY 4 HOURS AS NEEDED FOR WHEEZE 18 Each 2    celecoxib (CELEBREX) 100 mg capsule Take 1 Capsule by mouth two (2) times a day for 90 days. 60 Capsule 2    methocarbamoL (ROBAXIN) 500 mg tablet TAKE 2 TABLETS BY MOUTH 4 TIMES A DAY AS NEEDED FOR MUSCLE SPAMS      olopatadine (PATANOL) 0.1 % ophthalmic solution 1 DROP IN EACH EYE EVERY 12 HOURS AS NEEDED FOR ITCHY OR WATERY EYES.      ondansetron (ZOFRAN ODT) 4 mg disintegrating tablet TAKE 1 TABLET BY MOUTH EVERY SIX HOURS AS NEEDED FOR NAUSEA FOR 30 DAYS      sertraline (ZOLOFT) 100 mg tablet TAKE 1 AND 1/2 TABLET BY MOUTH EVERY DAY      dextroamphetamine-amphetamine (AdderalL) 20 mg tablet Take 20 mg by mouth as needed.  Take 1 ever day in the afternoon      azelastine (ASTELIN) 137 mcg (0.1 %) nasal spray 1 Spray by Both Nostrils route two (2) times daily as needed. buPROPion XL (WELLBUTRIN XL) 150 mg tablet Take 150 mg by mouth daily. dicyclomine (BENTYL) 10 mg capsule Take 10 mg by mouth every six (6) hours as needed. Linzess 72 mcg cap capsule Take 72 mcg by mouth daily. (Patient not taking: Reported on 9/29/2022)      Junel FE 1/20, 28, 1 mg-20 mcg (21)/75 mg (7) tab Take 1 Tablet by mouth daily. (Patient not taking: Reported on 9/29/2022)      amphetamine-dextroamphetamine XR (ADDERALL XR) 20 mg XR capsule Take 20 mg by mouth daily. zolpidem (AMBIEN) 5 mg tablet Take  by mouth daily.       EPINEPHrine (EPIPEN) 0.3 mg/0.3 mL injection       lansoprazole (PREVACID) 15 mg capsule TAKE 1 CAPSULE BY MOUTH EVERY DAY       Allergies   Allergen Reactions    Tape [Adhesive] Contact Dermatitis, Itching, Rash and Swelling

## 2023-01-24 ENCOUNTER — TELEPHONE (OUTPATIENT)
Dept: NEUROLOGY | Age: 33
End: 2023-01-24

## 2023-01-24 NOTE — TELEPHONE ENCOUNTER
Patient called and request her medical records to send [facility] to [facility] to her doctor over there. Fax number # [phone]    Also she needs to make appointment .

## 2023-01-24 NOTE — TELEPHONE ENCOUNTER
Patient contacted - left v/m for a return call to schedule appointment and see about her records request.

## 2023-03-14 ENCOUNTER — VIRTUAL VISIT (OUTPATIENT)
Dept: PRIMARY CARE CLINIC | Age: 33
End: 2023-03-14

## 2023-03-14 DIAGNOSIS — F41.9 ANXIETY AND DEPRESSION: ICD-10-CM

## 2023-03-14 DIAGNOSIS — F32.A ANXIETY AND DEPRESSION: ICD-10-CM

## 2023-03-14 DIAGNOSIS — J45.30 MILD PERSISTENT ASTHMA WITHOUT COMPLICATION: ICD-10-CM

## 2023-03-14 DIAGNOSIS — E55.9 VITAMIN D DEFICIENCY: ICD-10-CM

## 2023-03-14 DIAGNOSIS — K21.9 GASTROESOPHAGEAL REFLUX DISEASE WITHOUT ESOPHAGITIS: Primary | ICD-10-CM

## 2023-03-14 DIAGNOSIS — K50.919 CROHN'S DISEASE WITH COMPLICATION, UNSPECIFIED GASTROINTESTINAL TRACT LOCATION (HCC): ICD-10-CM

## 2023-03-14 DIAGNOSIS — G43.011 INTRACTABLE MIGRAINE WITHOUT AURA AND WITH STATUS MIGRAINOSUS: ICD-10-CM

## 2023-03-14 DIAGNOSIS — Z76.89 ENCOUNTER TO ESTABLISH CARE: ICD-10-CM

## 2023-03-14 DIAGNOSIS — R73.01 ELEVATED FASTING BLOOD SUGAR: ICD-10-CM

## 2023-03-14 DIAGNOSIS — G47.00 INSOMNIA, UNSPECIFIED TYPE: ICD-10-CM

## 2023-03-14 RX ORDER — FAMOTIDINE 40 MG/1
TABLET, FILM COATED ORAL
COMMUNITY
Start: 2023-02-17

## 2023-03-14 RX ORDER — ESCITALOPRAM OXALATE 10 MG/1
10 TABLET ORAL DAILY
COMMUNITY
Start: 2023-02-05

## 2023-03-14 NOTE — PROGRESS NOTES
HISTORY OF PRESENT ILLNESS  Domo Perez is a 29 y.o. female presents via telemedicine to establish care. Previous PCP: Dr. Sherman Camara at [facility].   GI doctor: Dr. Jamilah Garaz  She also sees psychiatrist and counselor. Concerns:  Would like to see dietitian to discuss weight loss and the foods she can eat with her Crohn's as her diet is limited. Muscle spasm between her shoulder blades that comes and goes x years. Previously was in PT. She uses flexeril PRN. There were no vitals filed for this visit. Patient Active Problem List   Diagnosis Code    Environmental and seasonal allergies J30.89    Crohn's disease ([facility] [address] K50.90    Asthma J45.909    GERD (gastroesophageal reflux disease) K21.9    Migraines G43.909    Vertigo R42    Anxiety and depression F41.9, F32. A    Attention deficit R41.840    PTSD (post-traumatic stress disorder) F43.10    Carpal tunnel syndrome G56.00     Patient Active Problem List    Diagnosis Date Noted    Anxiety and depression 11/08/2021    Attention deficit 11/08/2021    PTSD (post-traumatic stress disorder) 11/08/2021    Environmental and seasonal allergies     Crohn's disease ([facility] [address]     Asthma     GERD (gastroesophageal reflux disease)     Migraines     Vertigo     Carpal tunnel syndrome 01/01/2014     Current Outpatient Medications   Medication Sig Dispense Refill    escitalopram oxalate (LEXAPRO) 10 mg tablet Take 10 mg by mouth daily. famotidine (PEPCID) 40 mg tablet TAKE 1 TABLET BY MOUTH TWICE A DAY AS DIRECTED  DAYS      budesonide (PULMICORT) 180 mcg/actuation aepb inhaler Take 1 Puff by inhalation daily.  1 Each 1    albuterol (PROVENTIL HFA, VENTOLIN HFA, PROAIR HFA) 90 mcg/actuation inhaler TAKE 2 PUFFS BY MOUTH EVERY 4 HOURS AS NEEDED FOR WHEEZE 18 Each 2    methocarbamoL (ROBAXIN) 500 mg tablet TAKE 2 TABLETS BY MOUTH 4 TIMES A DAY AS NEEDED FOR MUSCLE SPAMS      olopatadine (PATANOL) 0.1 % ophthalmic solution 1 DROP IN EACH EYE EVERY 12 HOURS AS NEEDED FOR ITCHY OR WATERY EYES.      ondansetron (ZOFRAN ODT) 4 mg disintegrating tablet TAKE 1 TABLET BY MOUTH EVERY SIX HOURS AS NEEDED FOR NAUSEA FOR 30 DAYS      dextroamphetamine-amphetamine (ADDERALL) 20 mg tablet Take 20 mg by mouth as needed. Take 1 ever day in the afternoon      azelastine (ASTELIN) 137 mcg (0.1 %) nasal spray 1 [place] by Both Nostrils route two (2) times daily as needed. buPROPion XL (WELLBUTRIN XL) 150 mg tablet Take 150 mg by mouth daily. dicyclomine (BENTYL) 10 mg capsule Take 10 mg by mouth every six (6) hours as needed. Linzess 72 mcg cap capsule Take 72 mcg by mouth daily. Junel FE 1/20, , 1 mg-20 mcg (21)/75 mg () tab Take 1 Tablet by mouth daily. amphetamine-dextroamphetamine XR (ADDERALL XR) 20 mg XR capsule Take 20 mg by mouth daily. zolpidem (AMBIEN) 5 mg tablet Take  by mouth daily.       EPINEPHrine (EPIPEN) 0.3 mg/0.3 mL injection        Allergies   Allergen Reactions    Tape [Adhesive] Contact Dermatitis, Itching, Rash and Swelling     Past Medical History:   Diagnosis Date    Asthma     Carpal tunnel syndrome     bilateral    Crohn's disease ([address]     Environmental and seasonal allergies     GERD (gastroesophageal reflux disease)     Ill-defined condition     crohn's    Migraines     Psychiatric disorder     anxiety    PUD (peptic ulcer disease)     Vertigo      Past Surgical History:   Procedure Laterality Date    COLONOSCOPY N/A 2019    [facility]    HX  SECTION      HX OVARIAN CYST REMOVAL      HX TONSILLECTOMY       Family History   Problem Relation Age of Onset    Diabetes Mother     Hypertension Mother     Elevated Lipids Mother     Hypertension Father     Elevated Lipids Father      Social History     Tobacco Use    Smoking status: Former     Types: Cigarettes     Quit date: 2019     Years since quitting: 3.9    Smokeless tobacco: Never   Substance Use Topics    Alcohol use: Yes     Alcohol/week: 1.0 standard drink     Types: 1 Glasses of wine per week           Review of Systems   Constitutional: Negative. HENT: Negative. Eyes: Negative. Respiratory: Negative. Cardiovascular: Negative. Gastrointestinal:  Positive for heartburn. Neurological:  Positive for headaches. Psychiatric/Behavioral:  The patient is nervous/anxious. Physical Exam  Constitutional:       General: She is not in acute distress. Appearance: Normal appearance. HENT:      Head: Normocephalic. Eyes:      Conjunctiva/sclera: Conjunctivae normal.   Pulmonary:      Effort: Pulmonary effort is normal.   Neurological:      Mental Status: She is alert and oriented to person, place, and time. Psychiatric:         Mood and Affect: Mood normal.         Behavior: Behavior normal.         ASSESSMENT and PLAN  Diagnoses and all orders for this visit:    1. Gastroesophageal reflux disease without esophagitis  Comments:  stable on pepcid. Followed by GI. 2. Crohn's disease with complication, unspecified gastrointestinal tract location [facility])  Comments: Followed by GI. Interested in meeting with dietition. 3. Anxiety and depression  Comments:  well controlled on current medicatons. switched from zoloft to lexapro and has noticed a difference. sees psychiatrist and counselor. 4. Mild persistent asthma without complication  Comments:  stable on current therapy    5. Insomnia, unspecified type  Comments:  uses Ambien PRN. 6. Intractable migraine without aura and with status migrainosus  Comments:  notices they come on when looking at screen too long, she uses blue light blockers to prevent them. she uses imitrex PRN. She would like referral to neuro  Orders:  -     REFERRAL TO NEUROLOGY    7. Vitamin D deficiency  Comments:  Not on Vit D supplement. Will recheck levels. Orders:  -     VITAMIN D, 25 HYDROXY    8.  Encounter to establish care  -     CBC WITH AUTOMATED DIFF  -     METABOLIC PANEL, COMPREHENSIVE  -     LIPID PANEL  -     HEMOGLOBIN A1C WITH EAG    9. Elevated fasting blood sugar  -     HEMOGLOBIN A1C WITH EAG         Last PDMP [place] as Reviewed:  Review User Review Instant Review Result   Kenzie Hubbard 3/14/2023 11:55 AM Reviewed PDMP [1]           Scotty Roque, who was evaluated through a synchronous (real-time) audio-video encounter, and/or her healthcare decision maker, is aware that it is a billable service, with coverage as determined by her insurance carrier. She provided verbal consent to proceed: Yes, and patient identification was verified. It was conducted pursuant to the emergency declaration under the [address] [place] [address], [address] [place] [address] and the [employer] Act. A caregiver was present when appropriate. Ability to conduct physical exam was limited. I was at home. The patient was at home. Scotty Roque is a 29 y.o. female being evaluated by a Virtual Visit (video visit) encounter to address concerns as mentioned above. A caregiver was present when appropriate. Due to this being a TeleHealth encounter (During SROCS-70 public health emergency), evaluation of the following organ systems was limited: Vitals/Constitutional/EENT/Resp/CV/GI//MS/Neuro/Skin/Heme-Lymph-Imm. Pursuant to the emergency declaration under the [address] [place] [address] and the [employer] and [employer] Act, this Virtual Visit was conducted with patient's (and/or legal guardian's) consent, to reduce the risk of exposure to COVID-19 and provide necessary medical care. Services were provided through a video synchronous discussion virtually to substitute for in-person encounter. --CYN Murillo on 3/14/2023 at 11:14 AM    An electronic signature was used to authenticate this note.

## 2023-03-14 NOTE — PROGRESS NOTES
Identified Patient with two Patient identifiers(name and ). 1. \"Have you been to the ER, urgent care clinic since your last visit? Hospitalized since your last visit? \"  [facility]    2. \"Have you seen or consulted any other health care providers outside of the [address] [place] [address] since your last visit? \" No     3. For patients aged 39-70: Has the patient had a colonoscopy / FIT/ Cologuard? NA - based on age      If the patient is female:    4. For patients aged 41-77: Has the patient had a mammogram within the past 2 years? NA - based on age or sex      11. For patients aged 21-65: Has the patient had a pap smear? No     There were no vitals taken for this visit.     Chief Complaint   Patient presents with    [facility] Due   Topic Date Due    Pneumococcal 0-64 years (1 - PCV) Never done    Cervical cancer screen  2021    COVID-19 Vaccine (4 - Booster for Pfizer series) 03/15/2022

## 2023-05-19 RX ORDER — ZOLPIDEM TARTRATE 5 MG/1
TABLET ORAL DAILY
COMMUNITY

## 2023-05-19 RX ORDER — DEXTROAMPHETAMINE SACCHARATE, AMPHETAMINE ASPARTATE, DEXTROAMPHETAMINE SULFATE AND AMPHETAMINE SULFATE 5; 5; 5; 5 MG/1; MG/1; MG/1; MG/1
20 TABLET ORAL PRN
COMMUNITY

## 2023-05-19 RX ORDER — DEXTROAMPHETAMINE SACCHARATE, AMPHETAMINE ASPARTATE MONOHYDRATE, DEXTROAMPHETAMINE SULFATE AND AMPHETAMINE SULFATE 5; 5; 5; 5 MG/1; MG/1; MG/1; MG/1
20 CAPSULE, EXTENDED RELEASE ORAL DAILY
COMMUNITY

## 2023-05-19 RX ORDER — FAMOTIDINE 40 MG/1
TABLET, FILM COATED ORAL
COMMUNITY
Start: 2023-02-17

## 2023-05-19 RX ORDER — METHOCARBAMOL 500 MG/1
TABLET, FILM COATED ORAL
COMMUNITY
Start: 2022-09-13

## 2023-05-19 RX ORDER — ALBUTEROL SULFATE 90 UG/1
AEROSOL, METERED RESPIRATORY (INHALATION)
COMMUNITY
Start: 2022-10-17

## 2023-05-19 RX ORDER — DICYCLOMINE HYDROCHLORIDE 10 MG/1
10 CAPSULE ORAL EVERY 6 HOURS PRN
COMMUNITY
Start: 2022-08-15

## 2023-05-19 RX ORDER — BUPROPION HYDROCHLORIDE 150 MG/1
150 TABLET ORAL DAILY
COMMUNITY
Start: 2022-08-23

## 2023-05-19 RX ORDER — AZELASTINE 1 MG/ML
1 SPRAY, METERED NASAL 2 TIMES DAILY PRN
COMMUNITY
Start: 2022-08-23

## 2023-05-19 RX ORDER — ONDANSETRON 4 MG/1
TABLET, ORALLY DISINTEGRATING ORAL
COMMUNITY
Start: 2022-09-20

## 2023-05-19 RX ORDER — EPINEPHRINE 0.3 MG/.3ML
INJECTION SUBCUTANEOUS
COMMUNITY
Start: 2021-10-11

## 2023-05-19 RX ORDER — NORETHINDRONE ACETATE AND ETHINYL ESTRADIOL 1MG-20(21)
1 KIT ORAL DAILY
COMMUNITY
Start: 2022-06-22

## 2023-05-19 RX ORDER — OLOPATADINE HYDROCHLORIDE 1 MG/ML
SOLUTION/ DROPS OPHTHALMIC
COMMUNITY
Start: 2022-08-23

## 2023-05-19 RX ORDER — ESCITALOPRAM OXALATE 10 MG/1
10 TABLET ORAL DAILY
COMMUNITY
Start: 2023-02-05

## 2023-07-25 NOTE — TELEPHONE ENCOUNTER
----- Message from Leon Leonard sent at 8/30/2021  3:43 PM EDT -----  Regarding: Dr. Vince Dick first and last name: Pt      Reason for call: requesting sooner appointment      Callback required yes/no and why: Yes      Best contact number(s): [phone]      Details to clarify the request: Pt was calling to see if there was anything available sooner for her testing with Dr. Stephane Caldwell.       Leon Leonard Our plan will be to wean Olanzapine over time. Thanks!

## 2024-12-19 ENCOUNTER — TELEMEDICINE (OUTPATIENT)
Age: 34
End: 2024-12-19

## 2024-12-19 DIAGNOSIS — O24.410 DIET CONTROLLED GESTATIONAL DIABETES MELLITUS (GDM), ANTEPARTUM: Primary | ICD-10-CM

## 2024-12-19 NOTE — PROGRESS NOTES
blood glucose control   The defect seen in diabetes   Signs & symptoms of diabetes   Diagnosis of diabetes   Types of diabetes   Blood glucose targets in non-pregnant & non-pregnant adults       The participant knows  Their type of diabetes: Yes   The basic physiologic defect: Yes  Blood glucose targets: Yes     DATE DSMES TOPIC EVALUATION     12/19/2024 WHAT CAN I EAT?   General principles   Determining a healthy weight   Nutritional terms & tools   Healthy Plate method   Carbohydrate Counting   Reading food labels   Free apps   Pregnancy recommendations      The participant   Uses Healthy Plate principles in constructing meals: Yes  Reads food labels in choosing acceptable foods: Yes    The participant needs to address eating a minimum of 175 g of carbohydrates daily.   GITA CRESPO RN on 12/19/2024 at 1:00 PM    I have personally reviewed the health record, including provider notes, laboratory data and current medications before making these care and education recommendations. The time spent in this effort is included in the total time.  Total minutes: 60

## (undated) DEVICE — CANN NASAL O2 CAPNOGRAPHY AD -- FILTERLINE

## (undated) DEVICE — CANNULA CUSH AD W/ 14FT TBG

## (undated) DEVICE — SOLIDIFIER MEDC 1200ML -- CONVERT TO 356117

## (undated) DEVICE — 3M™ CUROS™ DISINFECTING CAP FOR NEEDLELESS CONNECTORS 270/CARTON 20 CARTONS/CASE CFF1-270: Brand: CUROS™

## (undated) DEVICE — Device

## (undated) DEVICE — CONTAINER SPEC 20 ML LID NEUT BUFF FORMALIN 10 % POLYPR STS

## (undated) DEVICE — SYRINGE 50ML E/T

## (undated) DEVICE — SET ADMIN 16ML TBNG L100IN 2 Y INJ SITE IV PIGGY BK DISP

## (undated) DEVICE — 1200 GUARD II KIT W/5MM TUBE W/O VAC TUBE: Brand: GUARDIAN

## (undated) DEVICE — SET GRAV CK VLV NEEDLESS ST 3 GANGED 4WAY STPCOCK HI FLO 10

## (undated) DEVICE — BASIN EMSIS 16OZ GRAPHITE PLAS KID SHP MOLD GRAD FOR ORAL

## (undated) DEVICE — KENDALL RADIOLUCENT FOAM MONITORING ELECTRODE -RECTANGULAR SHAPE: Brand: KENDALL

## (undated) DEVICE — CATH IV AUTOGRD BC BLU 22GA 25 -- INSYTE

## (undated) DEVICE — BAG SPEC BIOHZRD 10 X 10 IN --

## (undated) DEVICE — KIT COLON W/ 1.1OZ LUB AND 2 END

## (undated) DEVICE — ADULT SPO2 SENSOR: Brand: NELLCOR

## (undated) DEVICE — FORCEPS BX L240CM JAW DIA2.8MM L CAP W/ NDL MIC MESH TOOTH

## (undated) DEVICE — SIMPLICITY FLUFF UNDERPAD 23X36, MODERATE: Brand: SIMPLICITY

## (undated) DEVICE — BITEBLOCK ENDOSCP 60FR MAXI WHT POLYETH STURDY W/ VELC WVN

## (undated) DEVICE — BAG BELONG PT PERS CLEAR HANDL